# Patient Record
Sex: FEMALE | Race: WHITE | NOT HISPANIC OR LATINO | Employment: OTHER | URBAN - METROPOLITAN AREA
[De-identification: names, ages, dates, MRNs, and addresses within clinical notes are randomized per-mention and may not be internally consistent; named-entity substitution may affect disease eponyms.]

---

## 2017-12-28 ENCOUNTER — GYNECOLOGY VISIT (OUTPATIENT)
Dept: OBGYN | Facility: MEDICAL CENTER | Age: 72
End: 2017-12-28
Payer: MEDICARE

## 2017-12-28 VITALS — SYSTOLIC BLOOD PRESSURE: 138 MMHG | WEIGHT: 169 LBS | DIASTOLIC BLOOD PRESSURE: 82 MMHG

## 2017-12-28 DIAGNOSIS — N81.4 CYSTOCELE WITH UTERINE PROLAPSE: ICD-10-CM

## 2017-12-28 PROBLEM — N81.10 CYSTOCELE WITHOUT UTERINE PROLAPSE: Status: ACTIVE | Noted: 2017-12-28

## 2017-12-28 PROCEDURE — 99203 OFFICE O/P NEW LOW 30 MIN: CPT | Performed by: OBSTETRICS & GYNECOLOGY

## 2017-12-28 RX ORDER — OXYCODONE HYDROCHLORIDE AND ACETAMINOPHEN 5; 325 MG/1; MG/1
1-2 TABLET ORAL EVERY 4 HOURS PRN
COMMUNITY
End: 2021-12-07

## 2017-12-28 RX ORDER — PREGABALIN 25 MG/1
25 CAPSULE ORAL 3 TIMES DAILY
COMMUNITY
End: 2019-06-14

## 2017-12-28 RX ORDER — LISINOPRIL 5 MG/1
5 TABLET ORAL DAILY
COMMUNITY
End: 2019-10-01

## 2017-12-28 NOTE — PROGRESS NOTES
CC - VAGINAL BULGE X 3 MONTHS    History of present illness:   72 y.o. Postmenopause (50s) presents today to be evaluated for a vaginal bulge  She noticed it for last 3 months, pt reports that she have to press her lower abdomen to void, (+) incomplete emptying  Occasional PIEDAD/UI --> doesn't bother her   (+) constipation    (+) herpetic neuralgia - left side of breast thus she has stopped doing mammo x last 3 years    Review of systems:  Pertinent positives documented in HPI and all other systems reviewed & are negative    All PMH, PSH, allergies, social history and FH reviewed and updated today:  History reviewed. No pertinent past medical history.    Past Surgical History:   Procedure Laterality Date   • APPENDECTOMY     • CHOLECYSTECTOMY     • TUBAL COAGULATION LAPAROSCOPIC BILATERAL         Allergies: No Known Allergies    Social History     Social History   • Marital status:      Spouse name: N/A   • Number of children: N/A   • Years of education: N/A     Occupational History   • Not on file.     Social History Main Topics   • Smoking status: Never Smoker   • Smokeless tobacco: Never Used   • Alcohol use 1.2 oz/week     2 Glasses of wine per week   • Drug use: No   • Sexual activity: Yes     Partners: Male     Birth control/ protection: Post-Menopausal     Other Topics Concern   • Not on file     Social History Narrative   • No narrative on file       Family History   Problem Relation Age of Onset   • Hypertension Mother    • Heart Disease Father    • Hypertension Father    • Cancer Father        Physical exam:  Blood pressure 138/82, weight 76.7 kg (169 lb), not currently breastfeeding.    General:appears stated age, is in no apparent distress, is well developed and well nourished  Abdomen: Bowel sounds positive, nondistended, soft, nontender x4, no rebound or guarding. No organomegaly. No masses.  Female GYN: (+) cystocele grage 2-3 sp with valsalva, no leakage of urine  Cervix descends just beyond IS,  mobile, NT  No rectocele  Skin: No rashes, or ulcers or lesions seen  Psychiatric: Patient shows appropriate affect, is alert and oriented x3, intact judgment and insight.  1. Cystocele with uterine prolapse  REFERRAL TO GYNECOLOGY   2. Expectant vs pessary discussed with her. POP explained and brochure provided. ACOG video on POP showed to her as well to better understand  3. All questions addressed  4. Spent  30 minutes in face-to-face patient contact in which greater than 50% of that visit was spent in counseling/coordination of care of

## 2018-01-03 ENCOUNTER — GYNECOLOGY VISIT (OUTPATIENT)
Dept: OBGYN | Facility: MEDICAL CENTER | Age: 73
End: 2018-01-03
Payer: MEDICARE

## 2018-01-03 VITALS — SYSTOLIC BLOOD PRESSURE: 132 MMHG | DIASTOLIC BLOOD PRESSURE: 82 MMHG | WEIGHT: 170 LBS

## 2018-01-03 DIAGNOSIS — N81.4 CYSTOCELE WITH UTERINE PROLAPSE: ICD-10-CM

## 2018-01-03 PROCEDURE — 99213 OFFICE O/P EST LOW 20 MIN: CPT | Performed by: OBSTETRICS & GYNECOLOGY

## 2018-01-03 NOTE — PROGRESS NOTES
CC- PESSARY FITTING    History of present illness:   72 y.o. With symptomatic cysto-rectocele presents for pessary fitting  Doing well    Review of systems:  Pertinent positives documented in HPI and all other systems reviewed & are negative    All PMH, PSH, allergies, social history and FH reviewed and updated today:  No past medical history on file.    Past Surgical History:   Procedure Laterality Date   • APPENDECTOMY     • CHOLECYSTECTOMY     • TUBAL COAGULATION LAPAROSCOPIC BILATERAL         Allergies: No Known Allergies    Social History     Social History   • Marital status:      Spouse name: N/A   • Number of children: N/A   • Years of education: N/A     Occupational History   • Not on file.     Social History Main Topics   • Smoking status: Never Smoker   • Smokeless tobacco: Never Used   • Alcohol use 1.2 oz/week     2 Glasses of wine per week   • Drug use: No   • Sexual activity: Yes     Partners: Male     Birth control/ protection: Post-Menopausal     Other Topics Concern   • Not on file     Social History Narrative   • No narrative on file       Family History   Problem Relation Age of Onset   • Hypertension Mother    • Heart Disease Father    • Hypertension Father    • Cancer Father        Physical exam:  Blood pressure 132/82, weight 77.1 kg (170 lb).    General:appears stated age, is in no apparent distress, is well developed and well nourished  Female GYN: (+) cystocele 2-3 with valsalva  (+) grade 2 uterine prolapse  Skin: No rashes, or ulcers or lesions seen  Psychiatric: Patient shows appropriate affect, is alert and oriented x3, intact judgment and insight.  1. Cystocele with uterine prolapse     2. Size 3 ring fitted  3. Pt tolerated size - no pain, did not expel with valsalva  4. Will order size 3 ring with support  5. All questions addressed

## 2018-02-28 ENCOUNTER — GYNECOLOGY VISIT (OUTPATIENT)
Dept: OBGYN | Facility: MEDICAL CENTER | Age: 73
End: 2018-02-28
Payer: MEDICARE

## 2018-02-28 VITALS
WEIGHT: 168 LBS | BODY MASS INDEX: 25.46 KG/M2 | DIASTOLIC BLOOD PRESSURE: 80 MMHG | SYSTOLIC BLOOD PRESSURE: 130 MMHG | HEIGHT: 68 IN

## 2018-02-28 DIAGNOSIS — Z46.89 ENCOUNTER FOR FITTING AND ADJUSTMENT OF PESSARY: ICD-10-CM

## 2018-02-28 DIAGNOSIS — N81.4 CYSTOCELE WITH UTERINE PROLAPSE: ICD-10-CM

## 2018-02-28 DIAGNOSIS — Z12.39 BREAST SCREENING: ICD-10-CM

## 2018-02-28 PROCEDURE — 57160 INSERT PESSARY/OTHER DEVICE: CPT | Performed by: OBSTETRICS & GYNECOLOGY

## 2018-02-28 NOTE — PROGRESS NOTES
"Chief Complaint   Patient presents with   • Other     Pessary insertion        History of present illness:   72 y.o. With symptomatic cystocele with uterine prolapse presents for pessary insertion  She has bronchitis x 1 week now. Taking Abx  No further questions about the pessary     Review of systems:  Pertinent positives documented in HPI and all other systems reviewed & are negative    All PMH, PSH, allergies, social history and FH reviewed and updated today:  History reviewed. No pertinent past medical history.    Past Surgical History:   Procedure Laterality Date   • APPENDECTOMY     • CHOLECYSTECTOMY     • TUBAL COAGULATION LAPAROSCOPIC BILATERAL         Allergies: No Known Allergies    Social History     Social History   • Marital status:      Spouse name: N/A   • Number of children: N/A   • Years of education: N/A     Occupational History   • Not on file.     Social History Main Topics   • Smoking status: Never Smoker   • Smokeless tobacco: Never Used   • Alcohol use 1.2 oz/week     2 Glasses of wine per week   • Drug use: No   • Sexual activity: Yes     Partners: Male     Birth control/ protection: Post-Menopausal     Other Topics Concern   • Not on file     Social History Narrative   • No narrative on file       Family History   Problem Relation Age of Onset   • Hypertension Mother    • Heart Disease Father    • Hypertension Father    • Cancer Father      Physical exam:  Blood pressure 130/80, height 1.727 m (5' 8\"), weight 76.2 kg (168 lb).    General:appears stated age, is in no apparent distress, is well developed and well nourished  Head: normocephalic, non-tender  Neck: neck is supple  CV : regular rate and rhythm, no peripheral edema  Lungs: Normal respiratory effort. Clear to auscultation bilaterally  Abdomen: Bowel sounds positive, nondistended, soft, nontender x4, no rebound or guarding. No organomegaly. No masses.  Female GYN: (+) cystocele  Skin: No rashes, or ulcers or lesions " seen  Psychiatric: Patient shows appropriate affect, is alert and oriented x3, intact judgment and insight.    1. POP - cystocele with uterine prolapse, symptomatic  2. Breast cancer screening - Mammo  3. Size 3 ring with support inserted without difficulty. Pt has no complaints.   4. Pessary maintenance   5. Gyn ff-up 3 months/PRN

## 2018-05-03 ENCOUNTER — GYNECOLOGY VISIT (OUTPATIENT)
Dept: OBGYN | Facility: MEDICAL CENTER | Age: 73
End: 2018-05-03
Payer: MEDICARE

## 2018-05-03 VITALS
HEIGHT: 68 IN | WEIGHT: 168 LBS | SYSTOLIC BLOOD PRESSURE: 120 MMHG | BODY MASS INDEX: 25.46 KG/M2 | DIASTOLIC BLOOD PRESSURE: 76 MMHG

## 2018-05-03 DIAGNOSIS — Z46.89 PESSARY MAINTENANCE: ICD-10-CM

## 2018-05-03 PROCEDURE — 99213 OFFICE O/P EST LOW 20 MIN: CPT | Performed by: OBSTETRICS & GYNECOLOGY

## 2018-05-03 NOTE — PROGRESS NOTES
"Chief Complaint   Patient presents with   • Other     Pessary Maintenance        History of present illness:   72 y.o. presents today for pessary maintenance  The pessary relieves her symptoms of POP  No vaginal bleeding  (+) episode of lower pelvic pains - concerned about her ovaries    Review of systems:  Pertinent positives documented in HPI and all other systems reviewed & are negative    All PMH, PSH, allergies, social history and FH reviewed and updated today:  History reviewed. No pertinent past medical history.    Past Surgical History:   Procedure Laterality Date   • APPENDECTOMY     • CHOLECYSTECTOMY     • TUBAL COAGULATION LAPAROSCOPIC BILATERAL         Allergies: No Known Allergies    Social History     Social History   • Marital status:      Spouse name: N/A   • Number of children: N/A   • Years of education: N/A     Occupational History   • Not on file.     Social History Main Topics   • Smoking status: Never Smoker   • Smokeless tobacco: Never Used   • Alcohol use 1.2 oz/week     2 Glasses of wine per week   • Drug use: No   • Sexual activity: Yes     Partners: Male     Birth control/ protection: Post-Menopausal     Other Topics Concern   • Not on file     Social History Narrative   • No narrative on file       Family History   Problem Relation Age of Onset   • Hypertension Mother    • Heart Disease Father    • Hypertension Father    • Cancer Father        Physical exam:  Blood pressure 120/76, height 1.727 m (5' 8\"), weight 76.2 kg (168 lb).    General:appears stated age, is in no apparent distress, is well developed and well nourished  Head: normocephalic, non-tender  Neck: neck is supple  CV : regular rate and rhythm, no peripheral edema  Lungs: Normal respiratory effort. Clear to auscultation bilaterally  Abdomen: Bowel sounds positive, nondistended, soft, nontender x4, no rebound or guarding. No organomegaly. No masses.  Female GYN: SSE - no vaginal ulceration  (+) cysto-rectocele  BME - " unremarkable, no tenderness  Skin: No rashes, or ulcers or lesions seen  Psychiatric: Patient shows appropriate affect, is alert and oriented x3, intact judgment and insight.  1. Pessary maintenance     2.   TVS done - atrophic uterus and ovaries, no adnexal masses  3.    Pessary taken out, cleaned with soap and water. Replaced intravaginally gently. Pt tolerated procedure well  4.    Pessary maintenance 3-4 months/PRN

## 2018-09-12 ENCOUNTER — GYNECOLOGY VISIT (OUTPATIENT)
Dept: OBGYN | Facility: MEDICAL CENTER | Age: 73
End: 2018-09-12
Payer: MEDICARE

## 2018-09-12 VITALS
WEIGHT: 171 LBS | DIASTOLIC BLOOD PRESSURE: 70 MMHG | BODY MASS INDEX: 25.91 KG/M2 | SYSTOLIC BLOOD PRESSURE: 120 MMHG | HEIGHT: 68 IN

## 2018-09-12 DIAGNOSIS — Z46.89 ENCOUNTER FOR PESSARY MAINTENANCE: ICD-10-CM

## 2018-09-12 DIAGNOSIS — N81.9 FEMALE GENITAL PROLAPSE, UNSPECIFIED TYPE: ICD-10-CM

## 2018-09-12 PROCEDURE — 99212 OFFICE O/P EST SF 10 MIN: CPT | Performed by: OBSTETRICS & GYNECOLOGY

## 2018-09-12 NOTE — PROGRESS NOTES
"GYN Visit    CC: pessary maintenance    HPI: 72 y.o.  here for pessary maintenance.    Pt denies vaginal bleeding and pain.  No problems with current pessary.  Occasional mild discharge, no irritation, not malodorous  Pt is not on vaginal estrogen.    ROS:  Gen: denies general concerns  : denies concerns, see HPI      /70   Ht 1.727 m (5' 8\")   Wt 77.6 kg (171 lb)   Breastfeeding? No   BMI 26.00 kg/m²   Gen: AAO, NAD  : NEFG, normal vagina and cervix, no erosions     Pessary ring w/ support removed, cleaned and replaced easily    A/P: 72 y.o. F with pelvic organ prolapse here for pessary maintenance  - pt doing well with pessary, no signs of complications    Cont to RTC q3-4mos for pessary cleaning    Erin Gann MD  [facility], Women[facility]      "

## 2019-01-24 ENCOUNTER — GYNECOLOGY VISIT (OUTPATIENT)
Dept: OBGYN | Facility: MEDICAL CENTER | Age: 74
End: 2019-01-24
Payer: MEDICARE

## 2019-01-24 VITALS
WEIGHT: 170.64 LBS | HEIGHT: 68 IN | SYSTOLIC BLOOD PRESSURE: 140 MMHG | DIASTOLIC BLOOD PRESSURE: 90 MMHG | BODY MASS INDEX: 25.86 KG/M2

## 2019-01-24 DIAGNOSIS — N81.9 FEMALE GENITAL PROLAPSE, UNSPECIFIED TYPE: ICD-10-CM

## 2019-01-24 DIAGNOSIS — N95.2 VAGINAL ATROPHY: ICD-10-CM

## 2019-01-24 DIAGNOSIS — Z46.89 PESSARY MAINTENANCE: ICD-10-CM

## 2019-01-24 PROCEDURE — 99213 OFFICE O/P EST LOW 20 MIN: CPT | Performed by: OBSTETRICS & GYNECOLOGY

## 2019-01-24 RX ORDER — ESTRADIOL 10 UG/1
10 INSERT VAGINAL DAILY
Qty: 20 TAB | Refills: 0 | Status: SHIPPED | OUTPATIENT
Start: 2019-01-24 | End: 2019-06-14

## 2019-01-24 NOTE — PROGRESS NOTES
"GYN Visit    CC: pessary maintenance    HPI: 73 y.o.  here for pessary maintenance.    Pt denies vaginal bleeding, discharge and pain.  No problems with current pessary.    Leaves tomorrow to go down to [place] to visit her daughter.    ROS:  Gen: denies general concerns  : denies concerns, see HPI    GYN history: Reports always normal Pap smears every year for her adult life    /90 (BP Location: Right arm, Patient Position: Sitting)   Ht 1.727 m (5' 8\")   Wt 77.4 kg (170 lb 10.2 oz)   LMP  (LMP Unknown)   Breastfeeding? No   BMI 25.95 kg/m²   Gen: AAO, NAD  : NEFG, normal vagina and cervix with small area on posterior left cervix which was abraded during pessary removal and noted to be bleeding.  Silver nitrate applied with resulting hemostasis noted, no erosions of vaginal walls.    Pessary  removed, cleaned and replaced easily    A/P: 73 y.o. F with pelvic organ prolapse here for pessary maintenance  - pt doing well with pessary, although with significant atrophy noted and bleeding on cervix - no lesion seen today, anticipate from abrasion with pessary removal.  Will treat with vaginal estrogen and have pt return in 1 mos for repeat exam    RTC 1 month    Erin Gann MD  [facility], Women[facility]      "

## 2019-03-05 ENCOUNTER — GYNECOLOGY VISIT (OUTPATIENT)
Dept: OBGYN | Facility: MEDICAL CENTER | Age: 74
End: 2019-03-05
Payer: MEDICARE

## 2019-03-05 VITALS
WEIGHT: 173 LBS | DIASTOLIC BLOOD PRESSURE: 78 MMHG | BODY MASS INDEX: 26.22 KG/M2 | SYSTOLIC BLOOD PRESSURE: 115 MMHG | HEIGHT: 68 IN

## 2019-03-05 DIAGNOSIS — R10.9 ABDOMINAL DISCOMFORT: ICD-10-CM

## 2019-03-05 DIAGNOSIS — Z46.89 ENCOUNTER FOR PESSARY MAINTENANCE: ICD-10-CM

## 2019-03-05 PROCEDURE — 99213 OFFICE O/P EST LOW 20 MIN: CPT | Performed by: OBSTETRICS & GYNECOLOGY

## 2019-03-05 NOTE — PROGRESS NOTES
"GYN Visit    CC: pessary maintenance and abdominal discomfort    HPI: 73 y.o.  here for pessary maintenance.    Pt denies vaginal bleeding, discharge.  No problems with current pessary.  Does also reports some lower abdominal discomfort, mild cramping into her back.  Not very painful, reminds her of pain from her ovaries.  Cant' tell exactly how long it has been there, maybe a few weeks to months.  Occasional constipation but not worse recently, denies gasiness/bloating. No other concerns.  Denies dysuria.      ROS:  Gen: denies general concerns, fevers  GI: abd discomfort as above, denies constipation/diarrhea, other GI concerns  : denies concerns, see HPI    /78 (BP Location: Right arm)   Ht 1.727 m (5' 8\")   Wt 78.5 kg (173 lb)   LMP  (LMP Unknown)   BMI 26.30 kg/m²   Gen: AAO, NAD  : NEFG, normal vagina and cervix, no erosions; + atrophy with spotting from speculum exam of vaginal wall  Uterus: 8-10wk size, nontender, mobile, no adnexal masses or tenderness    Pessary removed, cleaned and replaced easily    A/P: 73 y.o. F with pelvic organ prolapse here for pessary maintenance also with abdominal discomfort  - pt doing well with pessary, no signs of complications  - abdominal discomfort: nonspecific by history and not present very long.  Pelvic exam normal today.  Rx for pelvic US given, if sxs self resolve can defer US, but if with persistent discomfort, recommend US to evaluate uterus/adnexa    - RTC q3mos for pessary cleaning, earlier if worsening sxs    Erin Gann MD  [facility], [facility]      "

## 2019-03-05 NOTE — PROGRESS NOTES
Pt presents for GYN visit,pessary check.Pt ha ha no bleeding and states slight cramping?. Pt did not have rx filled for vaginal estrogen due to issues with rx?   Ph#[phone]  Pharm#[id number]

## 2019-04-05 ENCOUNTER — OFFICE VISIT (OUTPATIENT)
Dept: URGENT CARE | Facility: CLINIC | Age: 74
End: 2019-04-05
Payer: MEDICARE

## 2019-04-05 ENCOUNTER — HOSPITAL ENCOUNTER (OUTPATIENT)
Facility: MEDICAL CENTER | Age: 74
End: 2019-04-05
Attending: PHYSICIAN ASSISTANT
Payer: MEDICARE

## 2019-04-05 VITALS
TEMPERATURE: 98.2 F | BODY MASS INDEX: 25.46 KG/M2 | WEIGHT: 168 LBS | DIASTOLIC BLOOD PRESSURE: 82 MMHG | OXYGEN SATURATION: 96 % | SYSTOLIC BLOOD PRESSURE: 128 MMHG | HEART RATE: 76 BPM | RESPIRATION RATE: 16 BRPM | HEIGHT: 68 IN

## 2019-04-05 DIAGNOSIS — N30.01 ACUTE CYSTITIS WITH HEMATURIA: ICD-10-CM

## 2019-04-05 LAB
APPEARANCE UR: NORMAL
BILIRUB UR STRIP-MCNC: NEGATIVE MG/DL
COLOR UR AUTO: NORMAL
GLUCOSE UR STRIP.AUTO-MCNC: NEGATIVE MG/DL
KETONES UR STRIP.AUTO-MCNC: NORMAL MG/DL
LEUKOCYTE ESTERASE UR QL STRIP.AUTO: NORMAL
NITRITE UR QL STRIP.AUTO: NEGATIVE
PH UR STRIP.AUTO: 6.5 [PH] (ref 5–8)
PROT UR QL STRIP: 300 MG/DL
RBC UR QL AUTO: NORMAL
SP GR UR STRIP.AUTO: 1.02
UROBILINOGEN UR STRIP-MCNC: 0.2 MG/DL

## 2019-04-05 PROCEDURE — 81002 URINALYSIS NONAUTO W/O SCOPE: CPT | Performed by: PHYSICIAN ASSISTANT

## 2019-04-05 PROCEDURE — 99214 OFFICE O/P EST MOD 30 MIN: CPT | Performed by: PHYSICIAN ASSISTANT

## 2019-04-05 PROCEDURE — 87186 SC STD MICRODIL/AGAR DIL: CPT

## 2019-04-05 PROCEDURE — 87077 CULTURE AEROBIC IDENTIFY: CPT

## 2019-04-05 PROCEDURE — 87086 URINE CULTURE/COLONY COUNT: CPT

## 2019-04-05 RX ORDER — CEFDINIR 300 MG/1
300 CAPSULE ORAL EVERY 12 HOURS
Qty: 10 CAP | Refills: 0 | Status: SHIPPED | OUTPATIENT
Start: 2019-04-05 | End: 2019-04-12

## 2019-04-05 ASSESSMENT — ENCOUNTER SYMPTOMS
FLANK PAIN: 0
MUSCULOSKELETAL NEGATIVE: 1
DIARRHEA: 0
ABDOMINAL PAIN: 0
DIZZINESS: 0
SHORTNESS OF BREATH: 0
VOMITING: 0
CHILLS: 0
FEVER: 0
NAUSEA: 0

## 2019-04-05 NOTE — PROGRESS NOTES
"Subjective:      Rosenda Cowan is a 73 y.o. female who presents with UTI (frequency, burning sensation, x2days)            Dysuria    This is a new problem. The current episode started in the past 7 days (2 days). The problem occurs every urination. The quality of the pain is described as burning. The pain is at a severity of 2/10. The pain is mild. There is no history of pyelonephritis. Associated symptoms include frequency and urgency. Pertinent negatives include no chills, flank pain, hematuria, nausea or vomiting. She has tried increased fluids for the symptoms. The treatment provided no relief. There is no history of catheterization, kidney stones, recurrent UTIs or a urological procedure.       Review of Systems   Constitutional: Negative for chills and fever.   HENT: Negative for congestion.    Respiratory: Negative for shortness of breath.    Cardiovascular: Negative for chest pain.   Gastrointestinal: Negative for abdominal pain, diarrhea, nausea and vomiting.   Genitourinary: Positive for dysuria, frequency and urgency. Negative for flank pain and hematuria.   Musculoskeletal: Negative.    Skin: Negative for rash.   Neurological: Negative for dizziness.          Objective:     /82 (BP Location: Left arm, Patient Position: Sitting, BP Cuff Size: Adult)   Pulse 76   Temp 36.8 °C (98.2 °F) (Temporal)   Resp 16   Ht 1.727 m (5' 8\")   Wt 76.2 kg (168 lb)   LMP  (LMP Unknown)   SpO2 96%   BMI 25.54 kg/m²      Physical Exam   Constitutional: She is oriented to person, place, and time. She appears well-developed and well-nourished. No distress.   HENT:   Head: Normocephalic and atraumatic.   Right Ear: External ear normal.   Left Ear: External ear normal.   Mouth/Throat: Oropharynx is clear and moist.   Eyes: Pupils are equal, round, and reactive to light. Conjunctivae are normal.   Neck: Normal range of motion.   Cardiovascular: Normal rate.    Pulmonary/Chest: Effort normal.   Abdominal: Soft. " Bowel sounds are normal. She exhibits no distension. There is no tenderness. There is no guarding.   No CVAT bilaterally    Musculoskeletal: Normal range of motion.   Neurological: She is alert and oriented to person, place, and time.   Skin: Skin is warm and dry. She is not diaphoretic.   Psychiatric: She has a normal mood and affect. Her behavior is normal.   Nursing note and vitals reviewed.         PMH:  has no past medical history on file.  MEDS:   Current Outpatient Prescriptions:   •  cefdinir (OMNICEF) 300 MG Cap, Take 1 Cap by mouth every 12 hours for 7 days., Disp: 10 Cap, Rfl: 0  •  pregabalin (LYRICA) 25 MG Cap, Take 25 mg by mouth 3 times a day., Disp: , Rfl:   •  estradiol (VAGIFEM) 10 MCG Tab, Insert 1 Tab in vagina every day. Insert 1 tab daily into the vagina for 2 weeks then insert 1 tab into the vagina 2x weekly (Patient not taking: Reported on 4/5/2019), Disp: 20 Tab, Rfl: 0  •  oxycodone-acetaminophen (PERCOCET) 5-325 MG Tab, Take 1-2 Tabs by mouth every four hours as needed., Disp: , Rfl:   •  lisinopril (PRINIVIL) 5 MG Tab, Take 5 mg by mouth every day., Disp: , Rfl:   ALLERGIES: No Known Allergies  SURGHX:   Past Surgical History:   Procedure Laterality Date   • APPENDECTOMY     • CHOLECYSTECTOMY     • TUBAL COAGULATION LAPAROSCOPIC BILATERAL       SOCHX:  reports that she has never smoked. She has never used smokeless tobacco. She reports that she drinks about 1.2 oz of alcohol per week . She reports that she does not use drugs.  FH: family history includes Cancer in her father; Heart Disease in her father; Hypertension in her father and mother.    POCT Urinalysis:   Component Results     Component Value Ref Range & Units Status   POC Color dark yellow  Negative Final   POC Appearance cloudy  Negative Final   POC Leukocyte Esterase large  Negative Final   POC Nitrites negative  Negative Final   POC Urobiligen 0.2  Negative (0.2) mg/dL Final   POC Protein 300  Negative mg/dL Final   POC Urine  PH 6.5  5.0 - 8.0 Final   POC Blood large  Negative Final   POC Specific Gravity 1.025  <1.005 - >1.030 Final   POC Ketones trace  Negative mg/dL Final   POC Bilirubin negative  Negative mg/dL Final   POC Glucose negative  Negative mg/dL Final   Last Resulted Time   Fri Apr 5, 2019 11:34 AM       Assessment/Plan:     1. Acute cystitis with hematuria  - POCT Urinalysis --> + leuks, + blood  - URINE CULTURE(NEW); Future  - cefdinir (OMNICEF) 300 MG Cap; Take 1 Cap by mouth every 12 hours for 7 days.  Dispense: 10 Cap; Refill: 0   - Complete full course of antibiotics as prescribed     - Pt educated on preventative measures for avoiding future UTIs  - Advised to increase fluid intake  - OTC Pyridium (Azo) for symptomatic relief, advised that it will turn urine orange in color  - Pending urine culture  - ER precautions given regarding pyelonephritis including fevers greater than 101 and, vomiting and dehydration, increased back pain.

## 2019-04-07 ENCOUNTER — TELEPHONE (OUTPATIENT)
Dept: URGENT CARE | Facility: CLINIC | Age: 74
End: 2019-04-07

## 2019-04-07 LAB
BACTERIA UR CULT: ABNORMAL
BACTERIA UR CULT: ABNORMAL
SIGNIFICANT IND 70042: ABNORMAL
SITE SITE: ABNORMAL
SOURCE SOURCE: ABNORMAL

## 2019-04-07 NOTE — TELEPHONE ENCOUNTER
Left message for patient informing her of positive urine culture result for E. Coli., susceptible to current course of antibiotics. Encouraged to return my call with any questions or concerns.

## 2019-06-14 ENCOUNTER — OFFICE VISIT (OUTPATIENT)
Dept: MEDICAL GROUP | Facility: PHYSICIAN GROUP | Age: 74
End: 2019-06-14
Payer: MEDICARE

## 2019-06-14 VITALS
HEART RATE: 74 BPM | SYSTOLIC BLOOD PRESSURE: 130 MMHG | WEIGHT: 173 LBS | TEMPERATURE: 98.3 F | RESPIRATION RATE: 14 BRPM | HEIGHT: 68 IN | OXYGEN SATURATION: 92 % | DIASTOLIC BLOOD PRESSURE: 70 MMHG | BODY MASS INDEX: 26.22 KG/M2

## 2019-06-14 DIAGNOSIS — B02.29 POST HERPETIC NEURALGIA: ICD-10-CM

## 2019-06-14 DIAGNOSIS — N81.4 CYSTOCELE WITH UTERINE PROLAPSE: ICD-10-CM

## 2019-06-14 DIAGNOSIS — N81.6 RECTOCELE: ICD-10-CM

## 2019-06-14 DIAGNOSIS — K59.00 CONSTIPATION, UNSPECIFIED CONSTIPATION TYPE: ICD-10-CM

## 2019-06-14 DIAGNOSIS — E78.2 MIXED HYPERLIPIDEMIA: ICD-10-CM

## 2019-06-14 DIAGNOSIS — I10 ESSENTIAL HYPERTENSION: ICD-10-CM

## 2019-06-14 PROCEDURE — 99214 OFFICE O/P EST MOD 30 MIN: CPT | Performed by: PHYSICIAN ASSISTANT

## 2019-06-14 RX ORDER — ATORVASTATIN CALCIUM 20 MG/1
20 TABLET, FILM COATED ORAL EVERY EVENING
Qty: 90 TAB | Refills: 2 | Status: SHIPPED | OUTPATIENT
Start: 2019-06-14 | End: 2019-10-01

## 2019-06-14 ASSESSMENT — PATIENT HEALTH QUESTIONNAIRE - PHQ9: CLINICAL INTERPRETATION OF PHQ2 SCORE: 0

## 2019-06-14 NOTE — LETTER
[facility]  Sosa Smalls P.A.-C.  [address]  [place] [address]  Fax: [phone]   Authorization for Release/Disclosure of   Protected Health Information   Name: KEKE VILLAR : 1945 SSN: [id number]   Address: [address] [place] [address] [place] [address] Phone:    [phone] (home)    I authorize the entity listed below to release/disclose the PHI below to:   [facility]/Sosa Smalls P.A.-C. and Sosa Smalls P.A.-C[facility]   Provider or Entity Name:     Address   City, State, Zip   Phone:      Fax:     Reason for request: continuity of care   Information to be released:    [  ] LAST COLONOSCOPY,  including any PATH REPORT and follow-up  [  ] LAST FIT/COLOGUARD RESULT [  ] LAST DEXA  [  ] LAST MAMMOGRAM  [  ] LAST PAP  [  ] LAST LABS [  ] RETINA EXAM REPORT  [  ] IMMUNIZATION RECORDS  [  ] Release all info      [  ] Check here and initial the line next to each item to release ALL health information INCLUDING  _____ Care and treatment for drug and / or alcohol abuse  _____ HIV testing, infection status, or AIDS  _____ Genetic Testing    DATES OF SERVICE OR TIME PERIOD TO BE DISCLOSED: _____________  I understand and acknowledge that:  * This Authorization may be revoked at any time by you in writing, except if your health information has already been used or disclosed.  * Your health information that will be used or disclosed as a result of you signing this authorization could be re-disclosed by the recipient. If this occurs, your re-disclosed health information may no longer be protected by State or Federal laws.  * You may refuse to sign this Authorization. Your refusal will not affect your ability to obtain treatment.  * This Authorization becomes effective upon signing and will  on (date) __________.      If no date is indicated, this Authorization will  one (1) year from the signature date.    Name: Keke Villar    Signature:   Date:     2019       PLEASE FAX REQUESTED RECORDS  BACK TO: [phone]

## 2019-06-14 NOTE — LETTER
[facility]  Sosa Smalls P.A.-C.  [address]  [place] [address]  Fax: [phone]   Authorization for Release/Disclosure of   Protected Health Information   Name: KEKE VILLAR : 1945 SSN: [id number]   Address: [address] [place] [address] [place] [address] Phone:    [phone] (home)    I authorize the entity listed below to release/disclose the PHI below to:   [facility]/Sosa Smalls P.A.-C. and Sosa Smalls P.A.-C[facility]   Provider or Entity Name:     Address   City, State, Zip   Phone:      Fax:     Reason for request: continuity of care   Information to be released:    [  ] LAST COLONOSCOPY,  including any PATH REPORT and follow-up  [  ] LAST FIT/COLOGUARD RESULT [  ] LAST DEXA  [  ] LAST MAMMOGRAM  [  ] LAST PAP  [  ] LAST LABS [  ] RETINA EXAM REPORT  [  ] IMMUNIZATION RECORDS  [  ] Release all info      [  ] Check here and initial the line next to each item to release ALL health information INCLUDING  _____ Care and treatment for drug and / or alcohol abuse  _____ HIV testing, infection status, or AIDS  _____ Genetic Testing    DATES OF SERVICE OR TIME PERIOD TO BE DISCLOSED: _____________  I understand and acknowledge that:  * This Authorization may be revoked at any time by you in writing, except if your health information has already been used or disclosed.  * Your health information that will be used or disclosed as a result of you signing this authorization could be re-disclosed by the recipient. If this occurs, your re-disclosed health information may no longer be protected by State or Federal laws.  * You may refuse to sign this Authorization. Your refusal will not affect your ability to obtain treatment.  * This Authorization becomes effective upon signing and will  on (date) __________.      If no date is indicated, this Authorization will  one (1) year from the signature date.    Name: Keke Villar    Signature:   Date:     2019       PLEASE FAX REQUESTED RECORDS  BACK TO: [phone]

## 2019-06-14 NOTE — PROGRESS NOTES
Chief Complaint   Patient presents with   • Establish Care     est care with nahomy        HISTORY OF PRESENT ILLNESS: Rosenda Cowan is an established 73 y.o. female here to discuss the evaluation and management of:      Post herpetic neuralgia  This is a chronic but stable problem.  She tells me that she is prescribed Lyrica 150 mg capsule 3 times daily for postherpetic neuralgia.  States she developed shingles in 11/2013 and since then she is been placed on Lyrica.  She tells me that she is compliant with medication expenses no side effects or complications of medication.  States she follows up with pain management at [facility] and they manage Lyrica for her.  States she is also prescribed Percocet when pain symptoms exacerbate.  States on average she will take pain medication 2-3 times per month.  Denies misuse or abuse of medication.  Denies drinking alcohol or combining other sedating medications when taking medication.  Denies operating heavy machinery when taking Percocet.  Further work-up indicated at this time.    Essential hypertension  States she was prescribed lisinopril 5 mg tab once daily but she discontinued medication 1 month ago on her own.  Patient's blood pressure during today's appointment 130/70 mmHg.  States she was experiencing elevated blood pressure readings due to pain symptoms so lisinopril was prescribed.  She tells me that she monitors her blood pressure at home and systolic readings are in the 120s and diastolic is usually 76/78 mmHg.  Denies chest pain, shortness of breath, heart palpitations, dizziness, syncope, severe headache, vision changes, peripheral edema.    Rectocele  Cystocele with uterine prolapse  Patient is following up with OB/GYN for further work-up.  Ultrasound has been scheduled for 6/24/2019 to further investigate pelvic pain symptoms.  She tells me that she has a pessary.  No further work-up indicated at this time.    Mixed hyperlipidemia  Patient has brought in  lipid profile lab work from 2017.  Total cholesterol and LDL were elevated.  Discussed cardiovascular risk score with patient.  Calculated ASCVD and patient is at medium risk.  11%.  Patient has agreed to starting a statin medication.  She tells me that she walks at least 5 days a week 30 minutes per time.  Denies getting her heart rate up or breaking a sweat.  States her diet could improve.    Constipation, unspecified constipation type  This is a chronic problem.  She tells me that she takes over-the-counter Metamucil and has been eating more grains.  States she will have a bowel movement every 2 days.  Admits that bowel movements can be semi-formed.  States she needs to work on hydration.  Denies melena, hematochezia, changes in caliber, nausea, vomiting, night sweats, fever, chills, unintentional weight loss.  Patient is inquiring about treatment options.          Patient Active Problem List    Diagnosis Date Noted   • Post herpetic neuralgia 2019   • Essential hypertension 2019   • Rectocele 2019   • Cystocele with uterine prolapse 2017       Allergies:Patient has no known allergies.    Current Outpatient Prescriptions   Medication Sig Dispense Refill   • atorvastatin (LIPITOR) 20 MG Tab Take 1 Tab by mouth every evening. 90 Tab 2   • LYRICA 150 MG Cap Take 1 Cap by mouth 3 times a day.     • oxycodone-acetaminophen (PERCOCET) 5-325 MG Tab Take 1-2 Tabs by mouth every four hours as needed.     • lisinopril (PRINIVIL) 5 MG Tab Take 5 mg by mouth every day.       No current facility-administered medications for this visit.        Social History   Substance Use Topics   • Smoking status: Never Smoker   • Smokeless tobacco: Never Used   • Alcohol use Yes      Comment: 3 - 4 dirnks per month.        Family Status   Relation Status   • Mo    • Fa    • Sis Alive   • Sis Alive   • Sis Alive   • Bridger Alive   • Bridger Alive     Family History   Problem Relation Age of Onset   •  "Hypertension Mother    • Heart Disease Father    • Hypertension Father    • Colon Cancer Father    • Other Sister         Intellectual Disability    • No Known Problems Daughter    • No Known Problems Daughter        ROS:  Review of Systems   Constitutional: Negative for fever, chills, weight loss and malaise/fatigue.   HENT: Negative for ear pain, nosebleeds, congestion, sore throat and neck pain.    Eyes: Negative for blurred vision.   Respiratory: Negative for cough, sputum production, shortness of breath and wheezing.    Cardiovascular: Negative for chest pain, palpitations, orthopnea and leg swelling.   Gastrointestinal: Negative for heartburn, nausea, vomiting and abdominal pain.   Genitourinary: Negative for dysuria, urgency and frequency.   Musculoskeletal: Negative for myalgias, back pain and joint pain.   Skin: Negative for rash and itching.   Neurological: Negative for dizziness, tingling, tremors, sensory change, focal weakness and headaches.   Endo/Heme/Allergies: Does not bruise/bleed easily.   Psychiatric/Behavioral: Negative for depression, suicidal ideas and memory loss.  The patient is not nervous/anxious and does not have insomnia.    All other systems reviewed and are negative except as in HPI.    Exam: /70 (BP Location: Left arm, Patient Position: Sitting, BP Cuff Size: Adult)   Pulse 74   Temp 36.8 °C (98.3 °F) (Temporal)   Resp 14   Ht 1.727 m (5' 8\")   Wt 78.5 kg (173 lb)   SpO2 92%  Body mass index is 26.3 kg/m².  General: Normal appearing. No distress.  HEENT: Normocephalic. Eyes conjunctiva clear lids without ptosis, pupils equal and reactive to light accommodation, ears normal shape and contour, canals are clear bilaterally, tympanic membranes are benign, nasal mucosa benign, oropharynx is without erythema, edema or exudates.   Neck: Supple without JVD or bruit. Thyroid is not enlarged.  Pulmonary: Clear to ausculation.  Normal effort. No rales, ronchi, or " wheezing.  Cardiovascular: Regular rate and rhythm without murmur.   Abdomen: Soft, nontender, nondistended. Normal bowel sounds. Liver and spleen are not palpable  Neurologic: Grossly nonfocal.  Cranial nerves are normal.   Lymph: No cervical, supraclavicular or axillary lymph nodes are palpable  Skin: Warm and dry.  No rashes or suspicious skin lesions.  Musculoskeletal: Normal gait. No extremity cyanosis, clubbing, or edema.  Psych: Normal mood and affect. Alert and oriented x3. Judgment and insight is normal.    Medical decision-making and discussion:  1. Post herpetic neuralgia  This is a chronic but stable problem.  Continue following up with pain management.  Continue current medication regimen.  Continue to monitor.  Reiterated the importance of not drinking alcohol, operate heavy machinery, or combine other sedating medication when taking controlled substances.  Patient agreed with plan.      2. Rectocele  3. Cystocele with uterine prolapse  Continue following up with OB/GYN.  Patient is scheduled for an ultrasound on 6/24/2019.  Continue to monitor.      4. Mixed hyperlipidemia  Discussed ASCVD risk with patient.  She has a high risk 11%.  Patient agreed to start a statin medication.  Advised patient to take over-the-counter co-Q10 2 weeks prior to starting statin medication.  Patient will complete lipid profile lab work after being on statin medication for 3 months.  Patient is scheduled to follow-up in 4 months for reevaluation.  Continue work on diet and exercise.    - atorvastatin (LIPITOR) 20 MG Tab; Take 1 Tab by mouth every evening.  Dispense: 90 Tab; Refill: 2  - Lipid Profile; Future  - Comp Metabolic Panel; Future    5. Essential hypertension  Patient discontinued lisinopril on her own.  States she felt that she no longer needed the medication and heard about the unpleasant long-term side effects.  Patient's blood pressure is 130/70 mmHg during today's appointment.  She tells me that she monitors  her blood pressure at home and systolic is usually in the 120s and diastolic ranges from 76/78 mmHg.  Advised patient to continue work on diet and exercise.  Decrease sodium consumption.  Discussed ED precautions.  Patient will follow-up in 4 months for reevaluation.    - Comp Metabolic Panel; Future    6. Constipation, unspecified constipation type  Discussed importance of healthy diet, regular exercise routine, and staying hydrated.  Advised patient to work on hydration and increase fiber consumption.  Patient will follow-up in 4 months for reevaluation.  We will consider starting patient on MiraLAX if symptoms have not improved.      Please note that this dictation was created using voice recognition software. I have made every reasonable attempt to correct obvious errors, but I expect that there are errors of grammar and possibly content that I did not discover before finalizing the note.    Assessment/Plan:  1. Post herpetic neuralgia     2. Essential hypertension     3. Rectocele     4. Cystocele with uterine prolapse     5. Mixed hyperlipidemia  atorvastatin (LIPITOR) 20 MG Tab    Lipid Profile       Return in about 4 months (around 10/14/2019).

## 2019-06-14 NOTE — LETTER
Critical [facility] hospital  [facility]  [address]  [place] [address]  Fax: [phone]   Authorization for Release/Disclosure of   Protected Health Information   Name: KEKE VILLAR : 1945 SSN: [id number]   Address: [address] [place] [address] [place] [address] Phone:    [phone] (home)    I authorize the entity listed below to release/disclose the PHI below to:   Critical access hospital/[facility] and [facility]   Provider or Entity Name:     Address   City, State, Zip   Phone:      Fax:     Reason for request: continuity of care   Information to be released:    [  ] LAST COLONOSCOPY,  including any PATH REPORT and follow-up  [  ] LAST FIT/COLOGUARD RESULT [  ] LAST DEXA  [  ] LAST MAMMOGRAM  [  ] LAST PAP  [  ] LAST LABS [  ] RETINA EXAM REPORT  [  ] IMMUNIZATION RECORDS  [  ] Release all info      [  ] Check here and initial the line next to each item to release ALL health information INCLUDING  _____ Care and treatment for drug and / or alcohol abuse  _____ HIV testing, infection status, or AIDS  _____ Genetic Testing    DATES OF SERVICE OR TIME PERIOD TO BE DISCLOSED: _____________  I understand and acknowledge that:  * This Authorization may be revoked at any time by you in writing, except if your health information has already been used or disclosed.  * Your health information that will be used or disclosed as a result of you signing this authorization could be re-disclosed by the recipient. If this occurs, your re-disclosed health information may no longer be protected by State or Federal laws.  * You may refuse to sign this Authorization. Your refusal will not affect your ability to obtain treatment.  * This Authorization becomes effective upon signing and will  on (date) __________.      If no date is indicated, this Authorization will  one (1) year from the signature date.    Name: Keke Villar    Signature:   Date:     2019       PLEASE FAX REQUESTED RECORDS  BACK TO: [phone]

## 2019-06-21 ENCOUNTER — GYNECOLOGY VISIT (OUTPATIENT)
Dept: OBGYN | Facility: MEDICAL CENTER | Age: 74
End: 2019-06-21
Payer: MEDICARE

## 2019-06-21 VITALS — BODY MASS INDEX: 26.3 KG/M2 | DIASTOLIC BLOOD PRESSURE: 70 MMHG | SYSTOLIC BLOOD PRESSURE: 142 MMHG | WEIGHT: 173 LBS

## 2019-06-21 DIAGNOSIS — N81.10 PELVIC ORGAN PROLAPSE QUANTIFICATION STAGE 3 CYSTOCELE: ICD-10-CM

## 2019-06-21 PROCEDURE — 99213 OFFICE O/P EST LOW 20 MIN: CPT | Performed by: OBSTETRICS & GYNECOLOGY

## 2019-06-21 NOTE — PROGRESS NOTES
S: Rosenda presents today for pessary removal.  She is having a pelvic ultrasound which is scheduled on June 24.  She complains of right-sided abdominal pain, described as an ache when she is on her feet for a prolonged period.  The pain has been worsening over the last 3 weeks.  She denies dysuria.  She has occasional constipation.  She has been using a pessary for pelvic organ prolapse for approximately 2 years.  Lately she feels like the pessary was rubbing in her vagina and she noticed some vaginal spotting.  She is unable to remove or replace the pessary herself.    O:/70   Wt 78.5 kg (173 lb)      GENERAL: Alert, in no apparent distress  PSYCHIATRIC: Appropriate affect, intact insight and judgement.  ABDOMEN: Soft, nontender, nondistended.  No palpable masses.  No rebound or guarding.  No inguinal lymphadenopathy.  No hepatosplenomegaly.  No hernias.  EXTREMITIES: No edema  SKIN: No rash    GENITOURINARY:  Normal external genitalia, no lesions.  Normal urethral meatus, no masses or tenderness.  2nd degree cystocele is present.   Vagina atrophic, no vaginal discharge.  A vascular area is present to the right of the urethra, no active bleeding.  No vaginal ulceration is present.  Cervix appears ulcerated and friable.  Uterus normal size, shape, and contour, nontender.  Adnexa nontender, no masses.  Normal anus and perineum.    Rectal Exam - not indicated.    Size 3 ring pessary with support removed.    A/P:  1.  Pelvic organ prolapse -pessary removed for upcoming pelvic ultrasound.  The patient is describing the pessary shifting when she walks.  There appears to be an ulceration on her cervix.  Recommend observation over the weekend, ultrasound on Monday, and repeat exam on Tuesday.  She may need a size 4 ring with support.  2. right-sided abdominal pain -pelvic ultrasound scheduled.    Follow-up Tuesday for pessary reinsertion.

## 2019-06-24 ENCOUNTER — HOSPITAL ENCOUNTER (OUTPATIENT)
Dept: RADIOLOGY | Facility: MEDICAL CENTER | Age: 74
End: 2019-06-24
Attending: OBSTETRICS & GYNECOLOGY
Payer: MEDICARE

## 2019-06-24 DIAGNOSIS — R10.9 ABDOMINAL DISCOMFORT: ICD-10-CM

## 2019-06-24 PROCEDURE — 76830 TRANSVAGINAL US NON-OB: CPT

## 2019-06-26 ENCOUNTER — HOSPITAL ENCOUNTER (OUTPATIENT)
Dept: LAB | Facility: MEDICAL CENTER | Age: 74
End: 2019-06-26
Attending: OBSTETRICS & GYNECOLOGY
Payer: MEDICARE

## 2019-06-26 ENCOUNTER — GYNECOLOGY VISIT (OUTPATIENT)
Dept: OBGYN | Facility: MEDICAL CENTER | Age: 74
End: 2019-06-26
Payer: MEDICARE

## 2019-06-26 VITALS
SYSTOLIC BLOOD PRESSURE: 120 MMHG | BODY MASS INDEX: 25.91 KG/M2 | DIASTOLIC BLOOD PRESSURE: 80 MMHG | WEIGHT: 171 LBS | HEIGHT: 68 IN

## 2019-06-26 DIAGNOSIS — R93.89 ENDOMETRIAL THICKENING ON ULTRASOUND: ICD-10-CM

## 2019-06-26 DIAGNOSIS — Z46.89 ENCOUNTER FOR PESSARY MAINTENANCE: ICD-10-CM

## 2019-06-26 DIAGNOSIS — N86 CERVICAL ULCERATION: ICD-10-CM

## 2019-06-26 DIAGNOSIS — N95.0 PMB (POSTMENOPAUSAL BLEEDING): ICD-10-CM

## 2019-06-26 DIAGNOSIS — Z12.4 CERVICAL CANCER SCREENING: ICD-10-CM

## 2019-06-26 LAB — PATHOLOGY CONSULT NOTE: NORMAL

## 2019-06-26 PROCEDURE — 57500 BIOPSY OF CERVIX: CPT | Performed by: OBSTETRICS & GYNECOLOGY

## 2019-06-26 PROCEDURE — 99213 OFFICE O/P EST LOW 20 MIN: CPT | Mod: 25 | Performed by: OBSTETRICS & GYNECOLOGY

## 2019-06-26 PROCEDURE — 88305 TISSUE EXAM BY PATHOLOGIST: CPT | Mod: 59

## 2019-06-26 PROCEDURE — 88175 CYTOPATH C/V AUTO FLUID REDO: CPT

## 2019-06-26 PROCEDURE — 87624 HPV HI-RISK TYP POOLED RSLT: CPT

## 2019-06-26 NOTE — NON-PROVIDER
Pt here to discuss U/S results and pessary insert and [facility]  Pt states no complaints  Good #[id number]  Pharmacy verified.

## 2019-06-26 NOTE — PROGRESS NOTES
"GYN Visit    CC: pessary maintenance, cervical ulceration      HPI: Rosenda Cowan is a 73 y.o.  here for f/u.  Had some abdominal discomfort at routine pessary cleaning with me 3/2019 for which an US was ordered.  Saw Dr. Condom  with complaints of R sided abdominal pain, worsening, and pain/spotting with pessary.  At that visit cervix was visualized to be ulcerated, pessary was removed and left out with plan for f/u exam today.  Pt also has had her US in the interim as below.       Doing well today but no change in R sided lower abdominal pain.  Denies VB, discharge.  Denies vaginal pain. Denies diarrhea/constipation, denies N/V.  Reports her paps were always normal.  Endorses approximately 3 weeks ago had one episode of light vaginal spotting, otherwise denies vaginal bleeding since menopause.      ROS:  constitutional: denies fevers, general concerns  GI: denies abd pain, N/V, diarrhea/constipation, blood in stool  :  vaginal bleeding as above, denies discharge, pain, denies urinary complaints    PMHx: HTN    Physical Exam:  /80 (BP Location: Left arm, Patient Position: Sitting)   Ht 1.727 m (5' 8\")   Wt 77.6 kg (171 lb)   LMP  (LMP Unknown)   Breastfeeding? No   BMI 26.00 kg/m²   gen: AAO, NAD, affect appropriate  abd: soft, NT, ND, no masses  : NEFG, normal urethral meatus, normal anus/perineum, normal vagina.  Cervix grossly normal with ulcerated area still apparent on posterior lip.  Uterus small, AV, no adnexal masses/tenderness  Skin: warm/dry, no lesions          19 TVUS:  FINDINGS:  Both transabdominal and transvaginal scanning were performed to optimally visualize the pelvis.    The uterus measures 2.96 cm x 4.88 cm x 3.83 cm.  The uterine myometrium is inhomogeneous.  The endometrial echo complex measures 1.07 cm. There is a small amount of fluid within the endometrial cavity.    Low resistive waveforms are confirmed within the ovaries.  The right ovary measures 1.35 cm " x 1.79 cm x 2.47 cm.    The left ovary measures 1.47 cm x 1.29 cm x 1.95 cm.    No adnexal mass identified.    There is trace free fluid seen.   Impression       Thickened endometrial stripe at 11 mm.    Unremarkable appearance of the ovaries.    Trace pelvic free fluid         EMB/cervical biopsy:  Biopsy attempted at posterior lip of cervix, ulceration.  Difficult to get good purchase with punch biopsy, minimal tissue collected however sent to pathology labeled a cervical biopsy.  Pap smear collected as well.  Cervix cleansed with betadine x3.  Ant lip grasped with tenaculum.  Pipelle passed easily through cervix, uterus sounded to 6.5cm.  3 passes were made with small tissue collected.  Specimen labeled and sent to pathology. All instruments removed with hemostasis noted.  Pt tolerated well.      A/P: 73 y.o.  with pelvic organ prolapse, thickened endometrium on ultrasound, posterior cervical ulceration  1. Encounter for pessary maintenance     2. Endometrial thickening on ultrasound  Consent for Surgery / Procedure     Discussed risks of procedures as above including pain, bleeding, infection.  Consent signed.  Low concern for malignancy, however given appearance of endometrium on ultrasound recommend endometrial biopsy which patient is amenable to today.  Does endorse episode of vaginal bleeding 3 weeks ago, previously thought to be related to cervical ulceration.  Denies history of abnormal Paps, cervical ulceration most likely related to pessary, however given persistence of the lesion biopsy attempted a Pap smear taken today.    Pessary left out today given biopsy, would prefer to give opportunity to heal.  Patient is leaving town on Monday, will return Friday for replacement.  Discussed that she would likely have some bleeding from the biopsy site now and also increased with replacement of the pessary, however likely will be better off with pessary in place for her travel.    Patient has follow-up  with me in approximately 2 weeks to discuss results.  We did discuss that should any of her biopsy be concerning for malignancy or show malignancy, she would be referred directly to GYN oncology.      Erin Gann MD  [facility], Women's [facility]

## 2019-06-27 LAB
CYTOLOGY REG CYTOL: NORMAL
HPV HR 12 DNA CVX QL NAA+PROBE: NEGATIVE
HPV16 DNA SPEC QL NAA+PROBE: NEGATIVE
HPV18 DNA SPEC QL NAA+PROBE: NEGATIVE
SPECIMEN SOURCE: NORMAL

## 2019-06-28 ENCOUNTER — GYNECOLOGY VISIT (OUTPATIENT)
Dept: OBGYN | Facility: MEDICAL CENTER | Age: 74
End: 2019-06-28
Payer: MEDICARE

## 2019-06-28 VITALS — WEIGHT: 172 LBS | DIASTOLIC BLOOD PRESSURE: 72 MMHG | BODY MASS INDEX: 26.15 KG/M2 | SYSTOLIC BLOOD PRESSURE: 117 MMHG

## 2019-06-28 DIAGNOSIS — N81.9 FEMALE GENITAL PROLAPSE, UNSPECIFIED TYPE: ICD-10-CM

## 2019-06-28 PROCEDURE — 99212 OFFICE O/P EST SF 10 MIN: CPT | Performed by: ADVANCED PRACTICE MIDWIFE

## 2019-06-28 NOTE — PROGRESS NOTES
Rosenda Cowan is a 73 y.o. female who presents for placement of pessary        HPI Comments: Pt presents for pessary placement and cervical check after having biopsy and EMB. She reports continued cramping at this time as well as light vaginal bleeding.  No LMP recorded (lmp unknown). Patient is postmenopausal.     Review of Systems   Pertinent positives documented in HPI and all other systems reviewed & are negative      History reviewed. No pertinent past medical history.    Medications:   Current Outpatient Prescriptions Ordered in [facility]   Medication Sig Dispense Refill   • LYRICA 150 MG Cap Take 1 Cap by mouth 3 times a day.     • atorvastatin (LIPITOR) 20 MG Tab Take 1 Tab by mouth every evening. 90 Tab 2   • oxycodone-acetaminophen (PERCOCET) 5-325 MG Tab Take 1-2 Tabs by mouth every four hours as needed.     • lisinopril (PRINIVIL) 5 MG Tab Take 5 mg by mouth every day.       No current [facility]-ordered facility-administered medications on file.           Objective:   Vital measurements:  /72   Wt 78 kg (172 lb)   Body mass index is 26.15 kg/m². (Goal BM I>18 <25)    Physical Exam   Nursing note and vitals reviewed.  Constitutional: She is oriented to person, place, and time. She appears well-developed and well-nourished. No distress.     Abdominal: Soft. Bowel sounds are normal. She exhibits no distension and no mass. No tenderness. She has no rebound and no guarding.     Genitourinary:  Pelvic exam was performed with patient supine.  External genitalia with no abnormal pigmentation, labial fusion,rash, tenderness, lesion or injury to the labia bilaterally.  Vagina is moist with no lesions, foul discharge, erythema, or tenderness.  No foreign body around the vagina or signs of injury.   Cervix exhibits friability and blood is noted coming from cervical os.   Bimanual exam deferred. Cystocele present.     Musculoskeletal: Normal range of motion. She exhibits no edema and no tenderness.     Skin: Skin is  warm and dry. No rash noted. She is not diaphoretic. No erythema. No pallor.     Psychiatric: She has a normal mood and affect. Her behavior is normal. Judgment and thought content normal.               Assessment:     1. Female genital prolapse, unspecified type         Plan:   1. Discussed in detail with patient after speculum exam my findings. At this time, inappropriate to replace pessary related to extended time needed for healing. She is to monitor bleeding at this time and return in 1 week for reexamination and likely placement.  Bleeding precautions reviewed with patient. She reports understanding.

## 2019-07-03 ENCOUNTER — GYNECOLOGY VISIT (OUTPATIENT)
Dept: OBGYN | Facility: MEDICAL CENTER | Age: 74
End: 2019-07-03
Payer: MEDICARE

## 2019-07-03 VITALS
DIASTOLIC BLOOD PRESSURE: 84 MMHG | BODY MASS INDEX: 26.07 KG/M2 | HEIGHT: 68 IN | WEIGHT: 172 LBS | SYSTOLIC BLOOD PRESSURE: 130 MMHG

## 2019-07-03 DIAGNOSIS — N81.4 CYSTOCELE WITH UTERINE PROLAPSE: ICD-10-CM

## 2019-07-03 DIAGNOSIS — N81.6 RECTOCELE: ICD-10-CM

## 2019-07-03 PROCEDURE — 99213 OFFICE O/P EST LOW 20 MIN: CPT | Performed by: OBSTETRICS & GYNECOLOGY

## 2019-07-03 NOTE — PROGRESS NOTES
"Chief Complaint   Patient presents with   • Gynecologic Exam     pessary   Chief complaint: Pessary cleaning and reinsertion      History of present illness: 73 y.o. presents to office for pessary cleaning and reinsertion.  Patient seen for endometrial biopsy due to thickened endometrium.  At that time she was noted to have some ulceration on her cervix.  Pessary not replaced at that time.    She was having some bleeding before hand which has now greatly decreased.  She reports using one panty liner a day with some minimal spotting      Review of systems:  Pertinent positives documented in HPI and a comprehensive review of system is negative    All PMH, PSH, allergies, social history and FH reviewed and updated today:  No past medical history on file.    Past Surgical History:   Procedure Laterality Date   • APPENDECTOMY     • CHOLECYSTECTOMY     • TUBAL COAGULATION LAPAROSCOPIC BILATERAL         Allergies: No Known Allergies    Social History     Social History   • Marital status:      Spouse name: N/A   • Number of children: N/A   • Years of education: N/A     Occupational History   • Not on file.     Social History Main Topics   • Smoking status: Never Smoker   • Smokeless tobacco: Never Used   • Alcohol use Yes      Comment: 3 - 4 dirnks per month.    • Drug use: No   • Sexual activity: Yes     Partners: Male     Birth control/ protection: Post-Menopausal      Comment: .      Other Topics Concern   • Not on file     Social History Narrative   • No narrative on file       Family History   Problem Relation Age of Onset   • Hypertension Mother    • Heart Disease Father    • Hypertension Father    • Colon Cancer Father    • Other Sister         Intellectual Disability    • No Known Problems Daughter    • No Known Problems Daughter        Physical exam:  /84 (BP Location: Left arm, Patient Position: Sitting)   Ht 1.727 m (5' 8\")   Wt 78 kg (172 lb)     GENERAL APPEARANCE: healthy, alert, no " distress  FEMALE GYN: normal female external genitalia without lesions, BUS normal, no vaginal discharge noted, vulva pink without erythema or friability, urethra is normal without discharge or scarring, no bladder fullness or masses, normal external genitalia, no erythema, no discharge, normal uterus, size and consistency, normal adnexa without tenderness, cervix has slight ulceration on it, no active bleeding noted, normal anus and perineum.  NEURO Awake, alert and oriented x 3, Normal gait, no sensory deficits  SKIN No rashes, or ulcers or lesions seen  PSYCHIATRIC: Patient shows appropriate affect, is alert and oriented x3, intact judgment and insight.      Assessment:  1. Cystocele with uterine prolapse     2. Rectocele         Plan:    Given the very slight ulceration noted on the cervix, I discussed with the patient the possibility of reinsertion of the pessary.  Patient would like to have pessary reinserted at this time.    Pessary was cleaned and then reinserted without problems    Follow-up on July 16 as previously scheduled    Questions answered    Spent  15 minutes in face-to-face patient contact in which greater than 50% of that visit was spent in counseling/coordination of care including medical and surgical options of care.

## 2019-07-10 ENCOUNTER — TELEPHONE (OUTPATIENT)
Dept: OBGYN | Facility: CLINIC | Age: 74
End: 2019-07-10

## 2019-07-10 NOTE — TELEPHONE ENCOUNTER
LVM to callback  ----- Message from Erin Gann M.D. sent at 7/1/2019  2:51 PM PDT -----  Normal biopsies without endometrial stroma, if without additional VB consider observation.   Will discuss at upcoming visit.      MA: please let pt know her biopsies came back normal.  We did not get a lot of endometrial tissue from her uterus, which happens often in postmenopausal women.  We can discuss at her upcoming follow-up visit with me whether or not we want to observe her clinically for additional bleeding, consider repeating the ultrasound, or consider additional attempt at obtaining more tissue.

## 2019-07-11 NOTE — TELEPHONE ENCOUNTER
Pt returned call.  Was informed of Bx results, will discuss on her next f/u (7/16/19) w/provider and consider u/s vs repeat Bx.

## 2019-07-12 ENCOUNTER — TELEPHONE (OUTPATIENT)
Dept: OBGYN | Facility: CLINIC | Age: 74
End: 2019-07-12

## 2019-07-12 NOTE — TELEPHONE ENCOUNTER
----- Message from Erin Gann M.D. sent at 7/1/2019  2:51 PM PDT -----  Normal biopsies without endometrial stroma, if without additional VB consider observation.   Will discuss at upcoming visit.      MA: please let pt know her biopsies came back normal.  We did not get a lot of endometrial tissue from her uterus, which happens often in postmenopausal women.  We can discuss at her upcoming follow-up visit with me whether or not we want to observe her clinically for additional bleeding, consider repeating the ultrasound, or consider additional attempt at obtaining more tissue.      Called patient, no answer. LVM asking patient to call us back to go over her most recent test results.

## 2019-07-16 ENCOUNTER — GYNECOLOGY VISIT (OUTPATIENT)
Dept: OBGYN | Facility: MEDICAL CENTER | Age: 74
End: 2019-07-16
Payer: MEDICARE

## 2019-07-16 VITALS
SYSTOLIC BLOOD PRESSURE: 140 MMHG | DIASTOLIC BLOOD PRESSURE: 82 MMHG | WEIGHT: 171 LBS | HEIGHT: 68 IN | BODY MASS INDEX: 25.91 KG/M2

## 2019-07-16 DIAGNOSIS — N95.0 PMB (POSTMENOPAUSAL BLEEDING): ICD-10-CM

## 2019-07-16 DIAGNOSIS — N81.9 FEMALE GENITAL PROLAPSE, UNSPECIFIED TYPE: ICD-10-CM

## 2019-07-16 PROCEDURE — 99213 OFFICE O/P EST LOW 20 MIN: CPT | Performed by: OBSTETRICS & GYNECOLOGY

## 2019-07-16 NOTE — PROGRESS NOTES
"GYN Visit     CC: Follow-up/discuss results    HPI: Rosenda Cowan is a 73 y.o.  with pessary in place here for follow-up.  Patient had right-sided abdominal pain for which pelvic ultrasound was done.  Endometrial stripe on the ultrasound was 11 mm so decision was made to proceed with endometrial biopsy.  Patient also been complaining of some pain with pessary and vaginal spotting, which was thought to be related to pessary.  Patient had a small ulcer on cervix, which was biopsied and for which a Pap smear was performed.  After biopsy of cervix, pessary was left out for several weeks to allow healing.  Pessary was replaced 7/3/2019 and patient is here today for follow-up.    Patient reports doing well, had some spotting after reinsertion of pessary, however has had no vaginal bleeding since then.  Is feeling well today and is without concerns.    ROS:  constitutional: denies general concerns  : denies vaginal bleeding, discharge, pain    PMHx: HTN      Past Surgical History:   Procedure Laterality Date   • APPENDECTOMY     • CHOLECYSTECTOMY     • TUBAL COAGULATION LAPAROSCOPIC BILATERAL           Physical Exam:  /82   Ht 1.727 m (5' 8\")   Wt 77.6 kg (171 lb)   LMP  (LMP Unknown)   Breastfeeding? No   BMI 26.00 kg/m²   gen: AAO, NAD, mood and affect appropriate    19:  Pap: ASCUS/HPV neg  A. Endometrial biopsy:         Strips of benign endometrial glandular epithelium without stroma          compatible with atrophy.         No intact endometrial with stroma is present for evaluation.         No atypia or malignancy identified in a limited specimen.         Deeper level examined.  B. Cervix biopsy:         Small fragments of benign ectocervical squamous mucosa and          adjacent endocervical glandular mucosa demonstrating focal          squamous metaplasia and mild to moderate acute and chronic          inflammation with reactive changes.         No dysplasia or malignancy " identified.    A/P: 73 y.o.  with pessary in place for pelvic organ prolapse  -Also healing on last exam, pessary in place and patient denies any further concerns at this time with bleeding, pain, discharge.  Discussed pathology as above.  Endometrial biopsy compatible with atrophy, discussed that there is not a lot of tissue obtained, however this is common in postmenopausal women.  Discussed that cannot guarantee that there is absolutely no risk of cancer, but given that she has had no further bleeding and there is another obvious source of light bleeding (ulceration on cervix) I feel comfortable clinically observing her for now if she is okay with that.  Discussed that if she did have return of bleeding, would want her to return to clinic right away and would recommend moving forward with an OR hysteroscopy, D&C for additional endometrial evaluation.    Patient present understanding.  Okay with clinical monitoring for now.  To return to 3 months for pessary maintenance, earlier if with bleeding or other concerns      Erin Gann MD  [facility], Women's Health

## 2019-08-29 ENCOUNTER — HOSPITAL ENCOUNTER (OUTPATIENT)
Facility: MEDICAL CENTER | Age: 74
End: 2019-08-29
Attending: PHYSICIAN ASSISTANT
Payer: MEDICARE

## 2019-08-29 ENCOUNTER — OFFICE VISIT (OUTPATIENT)
Dept: URGENT CARE | Facility: CLINIC | Age: 74
End: 2019-08-29
Payer: MEDICARE

## 2019-08-29 VITALS
HEIGHT: 66 IN | RESPIRATION RATE: 16 BRPM | BODY MASS INDEX: 27.9 KG/M2 | DIASTOLIC BLOOD PRESSURE: 82 MMHG | OXYGEN SATURATION: 95 % | HEART RATE: 68 BPM | SYSTOLIC BLOOD PRESSURE: 120 MMHG | TEMPERATURE: 97.1 F | WEIGHT: 173.6 LBS

## 2019-08-29 DIAGNOSIS — N30.01 ACUTE CYSTITIS WITH HEMATURIA: ICD-10-CM

## 2019-08-29 PROCEDURE — 87077 CULTURE AEROBIC IDENTIFY: CPT

## 2019-08-29 PROCEDURE — 87086 URINE CULTURE/COLONY COUNT: CPT

## 2019-08-29 PROCEDURE — 99214 OFFICE O/P EST MOD 30 MIN: CPT | Performed by: PHYSICIAN ASSISTANT

## 2019-08-29 PROCEDURE — 87186 SC STD MICRODIL/AGAR DIL: CPT

## 2019-08-29 RX ORDER — CEFUROXIME AXETIL 250 MG/1
250 TABLET ORAL 2 TIMES DAILY
Qty: 14 TAB | Refills: 0 | Status: SHIPPED | OUTPATIENT
Start: 2019-08-29 | End: 2019-09-05

## 2019-08-29 NOTE — PROGRESS NOTES
Chief Complaint   Patient presents with   • Dysuria     x 3 days, Pain with urination, urinary frequency and lower back pain       HISTORY OF PRESENT ILLNESS: Patient is a 73 y.o. female who presents today for 3 days of not improving UTI symptoms.  She is having increased frequency, urgency and dysuria.   She notes some bilateral lower back pain and this is worsened by the fact she has to get up a lot at night to urinate.   No hematuria.  No nausea, vomiting, fevers.    Took Azo with mild relief.     Patient Active Problem List    Diagnosis Date Noted   • Post herpetic neuralgia 2019   • Essential hypertension 2019   • Rectocele 2019   • Cystocele with uterine prolapse 2017       Allergies:Patient has no known allergies.    Current Outpatient Medications Ordered in Epic   Medication Sig Dispense Refill   • Phenazopyridine HCl (AZO TABS PO) Take  by mouth.     • LYRICA 150 MG Cap Take 1 Cap by mouth 3 times a day.     • oxycodone-acetaminophen (PERCOCET) 5-325 MG Tab Take 1-2 Tabs by mouth every four hours as needed.     • atorvastatin (LIPITOR) 20 MG Tab Take 1 Tab by mouth every evening. (Patient not taking: Reported on 2019) 90 Tab 2   • lisinopril (PRINIVIL) 5 MG Tab Take 5 mg by mouth every day.       No current [facility]-ordered facility-administered medications on file.        Past Medical History:   Diagnosis Date   • Hypertension        Social History     Tobacco Use   • Smoking status: Never Smoker   • Smokeless tobacco: Never Used   Substance Use Topics   • Alcohol use: Yes     Comment: 3 - 4 dirnks per month.    • Drug use: No       Family Status   Relation Name Status   • Mo     • Fa     • Sis  Alive   • Sis  Alive   • Sis  Alive   • Bridger  Alive   • Bridger  Alive     Family History   Problem Relation Age of Onset   • Hypertension Mother    • Heart Disease Father    • Hypertension Father    • Colon Cancer Father    • Other Sister         Intellectual Disability    • No  "Known Problems Daughter    • No Known Problems Daughter        ROS:  Review of Systems   Constitutional: Negative for fever, chills, weight loss and malaise/fatigue.   HENT: Negative for ear pain, nosebleeds, congestion, sore throat and neck pain.    Eyes: Negative for blurred vision.   Respiratory: Negative for cough, sputum production, shortness of breath and wheezing.    Cardiovascular: Negative for chest pain, palpitations, orthopnea and leg swelling.   Gastrointestinal: Negative for heartburn, nausea, vomiting and abdominal pain.   Genitourinary: SEE HPI    Exam:  /82 (BP Location: Left arm, Patient Position: Sitting, BP Cuff Size: Adult)   Pulse 68   Temp 36.2 °C (97.1 °F) (Temporal)   Resp 16   Ht 1.664 m (5' 5.5\")   Wt 78.7 kg (173 lb 9.6 oz)   SpO2 95%   General:  Well nourished, well developed female in NAD  Eyes: PERRLA, EOM within normal limits, no conjunctival injection, no scleral icterus, visual fields and acuity grossly intact.  Mouth: reasonable hygiene, no erythema exudates or tonsillar enlargement.  Neck: no masses, range of motion within normal limits, no tracheal deviation. No lymphadenopathy  Pulmonary: Normal respiratory effort, no wheezes, crackles, or rhonchi.  Cardiovascular: regular rate and rhythm without murmurs, rubs, or gallops.  Abdomen: Soft, nontender, nondistended. Normal bowel sounds. No hepatosplenomegaly or masses, or hernias. No rebound or guarding. No CVA tenderness.   Skin: No visible rashes or lesion. Warm, pink, dry.   Extremities: no clubbing, cyanosis, or edema.  Neuro: A&O x 3. Speech normal/clear.  Normal gait.         Assessment/Plan:  1. Acute cystitis with hematuria  URINE CULTURE(NEW)    cefUROXime (CEFTIN) 250 MG Tab       -U/A with Azo however able to see blood, leuks.   -culture sent  -Fluids emphasized.    -signs and symptoms of worsening/ascending infection discussed and to seek prompt medical care should they arise.      Supportive care, " differential diagnoses, and indications for immediate follow-up discussed with patient.   Pathogenesis of diagnosis discussed including typical length and natural progression.   Instructed to return to clinic or nearest emergency department for any change in condition, further concerns, or worsening of symptoms.  Patient states understanding of the plan of care and discharge instructions.        Cyndi Melgar P.A.-C.

## 2019-09-23 ENCOUNTER — GYNECOLOGY VISIT (OUTPATIENT)
Dept: OBGYN | Facility: MEDICAL CENTER | Age: 74
End: 2019-09-23
Payer: MEDICARE

## 2019-09-23 VITALS — SYSTOLIC BLOOD PRESSURE: 140 MMHG | WEIGHT: 173 LBS | DIASTOLIC BLOOD PRESSURE: 84 MMHG | BODY MASS INDEX: 28.35 KG/M2

## 2019-09-23 DIAGNOSIS — Z01.818 PREOP EXAMINATION: ICD-10-CM

## 2019-09-23 DIAGNOSIS — N95.0 POSTMENOPAUSAL BLEEDING: ICD-10-CM

## 2019-09-23 DIAGNOSIS — R06.02 SHORTNESS OF BREATH: ICD-10-CM

## 2019-09-23 PROCEDURE — 99214 OFFICE O/P EST MOD 30 MIN: CPT | Performed by: OBSTETRICS & GYNECOLOGY

## 2019-09-23 NOTE — PROGRESS NOTES
GYN Visit    CC: pessary maintenance, postmenopausal bleeding      HPI: 73 y.o.  here for follow-up of postmenopausal bleeding as well as pessary maintenance.    Previously small amount of spotting, ultrasound was done which revealed an 11 mm endometrial stripe.  Endometrial biopsy was performed with benign endometrial glandular epithelium but no stroma available.  Plan at that time for clinical observation.  Patient also had cervical biopsy for small abnormality visualized which was normal.  Pap showed ASCUS, HPV negative.    Patient reports that she has had some spotting, mostly with physical activity like when she is gardening or doing other active things.  No heavy bleeding.  Mostly just notices with wiping or lightly on a pad.    Occasionally feels sore or mildly crampy in the lower abdomen.      PMHx: HTN  Meds: reports only lyrica, used to be on lisinopril but reports hasn't needed this.      ROS:  Gen: denies general concerns  : Vaginal spotting as above, denies discharge/pain.    /84 (BP Location: Left arm, Patient Position: Sitting)   Wt 78.5 kg (173 lb)   LMP  (LMP Unknown)   Breastfeeding? No   BMI 28.35 kg/m²   Gen: AAO, NAD  : NEFG, normal vagina and cervix, no erosions; small pink-tinged discharge noted.    Pessary removed, cleaned and replaced easily    A/P: 73 y.o. F with postmenopausal bleeding, pelvic organ prolapse     Given continued vaginal bleeding, although light, recommend additional endometrial evaluation with hysteroscopy, D&C.  Patient amenable.  Referral placed today.  If unable to get scheduled in the next approximately 1 month on a regular OR day, discussed with patient that I do not want her to wait a significant amount of time for this evaluation and we may be able to schedule it as an early morning case before I am in the office one day.  Discussed briefly risks of surgery, primarily anesthetic risk, but also risk of uterine perforation.    Patient is seeing her  primary care at the beginning of October, I asked her to discuss with them her upcoming procedure.  Chest x-ray/EKG prescription given.  She has a history of hypertension, mild range blood pressures today, reports that she is no longer on hypertensive medication.    We will have patient follow-up for preoperative visit once procedure scheduled.    Erin Gann MD  [facility], Women's Health

## 2019-10-01 ENCOUNTER — OFFICE VISIT (OUTPATIENT)
Dept: MEDICAL GROUP | Facility: PHYSICIAN GROUP | Age: 74
End: 2019-10-01
Payer: MEDICARE

## 2019-10-01 VITALS
SYSTOLIC BLOOD PRESSURE: 124 MMHG | RESPIRATION RATE: 20 BRPM | WEIGHT: 177.2 LBS | HEIGHT: 66 IN | HEART RATE: 72 BPM | DIASTOLIC BLOOD PRESSURE: 76 MMHG | TEMPERATURE: 99.2 F | BODY MASS INDEX: 28.48 KG/M2 | OXYGEN SATURATION: 95 %

## 2019-10-01 DIAGNOSIS — E55.9 VITAMIN D DEFICIENCY: ICD-10-CM

## 2019-10-01 DIAGNOSIS — I10 ESSENTIAL HYPERTENSION: ICD-10-CM

## 2019-10-01 DIAGNOSIS — Z78.0 POST-MENOPAUSAL: ICD-10-CM

## 2019-10-01 PROCEDURE — 99213 OFFICE O/P EST LOW 20 MIN: CPT | Performed by: PHYSICIAN ASSISTANT

## 2019-10-01 NOTE — LETTER
[facility]  Sosa Smalls P.A.-C.  [address]  [place] [address]  Fax: [phone]   Authorization for Release/Disclosure of   Protected Health Information   Name: KEKE VILLAR : 1945 SSN: [id number]   Address: [address] [place] [address] [place] [address] Phone:    [phone] (home)    I authorize the entity listed below to release/disclose the PHI below to:   [facility]/Sosa Smalls P.A.-C. and Sosa Smalls P.A.-C.   Provider or Entity Name:     Address   City, State, Zip   Phone:      Fax:     Reason for request: continuity of care   Information to be released:    [  ] LAST COLONOSCOPY,  including any PATH REPORT and follow-up  [  ] LAST FIT/COLOGUARD RESULT [  ] LAST DEXA  [  ] LAST MAMMOGRAM  [  ] LAST PAP  [  ] LAST LABS [  ] RETINA EXAM REPORT  [  ] IMMUNIZATION RECORDS  [  ] Release all info      [  ] Check here and initial the line next to each item to release ALL health information INCLUDING  _____ Care and treatment for drug and / or alcohol abuse  _____ HIV testing, infection status, or AIDS  _____ Genetic Testing    DATES OF SERVICE OR TIME PERIOD TO BE DISCLOSED: _____________  I understand and acknowledge that:  * This Authorization may be revoked at any time by you in writing, except if your health information has already been used or disclosed.  * Your health information that will be used or disclosed as a result of you signing this authorization could be re-disclosed by the recipient. If this occurs, your re-disclosed health information may no longer be protected by State or Federal laws.  * You may refuse to sign this Authorization. Your refusal will not affect your ability to obtain treatment.  * This Authorization becomes effective upon signing and will  on (date) __________.      If no date is indicated, this Authorization will  one (1) year from the signature date.    Name: Keke Villar    Signature:   Date:     10/1/2019       PLEASE FAX REQUESTED RECORDS  BACK TO: [phone]

## 2019-10-01 NOTE — PROGRESS NOTES
Chief Complaint   Patient presents with   • Establish Care     Prior PCP         HISTORY OF PRESENT ILLNESS: Rosenda Cowan is an established 73 y.o. female here to discuss the evaluation and management of:    Essential hypertension  Stable.  Patient is not on antihypertensive medications.  She discontinued lisinopril 5 mg tab once daily several months ago.  Patient's blood pressure during today's appointment 124/76 mmHg.  States overall she is feeling well.  Denies chest pain, shortness of breath, heart palpitations, dizziness, syncope, severe headache, vision changes.    Post-menopausal  Patient is postmenopausal.  Will order vitamin D lab work and DEXA scan to further evaluate patient.  She denies taking over-the-counter vitamin D or calcium supplementation.    Vitamin D deficiency  See above.         Patient Active Problem List    Diagnosis Date Noted   • Post herpetic neuralgia 2019   • Essential hypertension 2019   • Rectocele 2019   • Cystocele with uterine prolapse 2017       Allergies:Patient has no known allergies.    Current Outpatient Medications   Medication Sig Dispense Refill   • LYRICA 150 MG Cap Take 1 Cap by mouth 3 times a day.     • oxycodone-acetaminophen (PERCOCET) 5-325 MG Tab Take 1-2 Tabs by mouth every four hours as needed.       No current facility-administered medications for this visit.        Social History     Tobacco Use   • Smoking status: Never Smoker   • Smokeless tobacco: Never Used   Substance Use Topics   • Alcohol use: Yes     Comment: 3 - 4 dirnks per month.    • Drug use: No       Family Status   Relation Name Status   • Mo     • Fa     • Sis  Alive   • Sis  Alive   • Sis  Alive   • Bridger  Alive   • Bridger  Alive     Family History   Problem Relation Age of Onset   • Hypertension Mother    • Heart Disease Father    • Hypertension Father    • Colon Cancer Father    • Other Sister         Intellectual Disability    • No Known Problems  "Daughter    • No Known Problems Daughter        ROS:  Review of Systems   Constitutional: Negative for fever, chills, weight loss and malaise/fatigue.   HENT: Negative for ear pain, nosebleeds, congestion, sore throat and neck pain.    Eyes: Negative for blurred vision.   Respiratory: Negative for cough, sputum production, shortness of breath and wheezing.    Cardiovascular: Negative for chest pain, palpitations, orthopnea and leg swelling.   Gastrointestinal: Negative for heartburn, nausea, vomiting and abdominal pain.   Genitourinary: Negative for dysuria, urgency and frequency.   Musculoskeletal: Negative for myalgias, back pain and joint pain.   Skin: Negative for rash and itching.   Neurological: Negative for dizziness, tingling, tremors, sensory change, focal weakness and headaches.   Endo/Heme/Allergies: Does not bruise/bleed easily.   Psychiatric/Behavioral: Negative for depression, suicidal ideas and memory loss.  The patient is not nervous/anxious and does not have insomnia.    All other systems reviewed and are negative except as in HPI.    Exam: /76 (BP Location: Left arm, Patient Position: Sitting, BP Cuff Size: Adult)   Pulse 72   Temp 37.3 °C (99.2 °F) (Temporal)   Resp 20   Ht 1.664 m (5' 5.5\")   Wt 80.4 kg (177 lb 3.2 oz)   SpO2 95%  Body mass index is 29.04 kg/m².  General: Normal appearing. No distress.  HEENT: Normocephalic. Eyes conjunctiva clear lids without ptosis, ears normal shape and contour.  Neck: Supple without JVD. Thyroid is not enlarged.  Pulmonary: Clear to ausculation.  Normal effort. No rales, ronchi, or wheezing.  Cardiovascular: Regular rate and rhythm without murmur.   Abdomen: Soft, nontender, nondistended. Normal bowel sounds. Liver and spleen are not palpable  Neurologic: Grossly nonfocal.  Cranial nerves are normal.   Lymph: No cervical, supraclavicular  lymph nodes are palpable  Skin: Warm and dry.  No rashes or suspicious skin lesions.  Musculoskeletal: Normal " gait. No extremity cyanosis, clubbing, or edema.  Psych: Normal mood and affect. Alert and oriented x3. Judgment and insight is normal.    Medical decision-making and discussion:  1. Essential hypertension  Continue to monitor.  Patient's blood pressure during today's appointment 124/76 on Hg.  Continue work on diet, exercise, and sleep hygiene.    - CBC WITH DIFFERENTIAL; Future    2. Post-menopausal    - DS-BONE DENSITY STUDY (DEXA); Future    3. Vitamin D deficiency  Digestive patient take over-the-counter 1-2000 units of vitamin D once daily.  Advised patient if she is not getting enough calcium in her diet (1200mg) splint with 500-1000 mg once daily.  She will follow-up in 3 months for reevaluation.  - VITAMIN D,25 HYDROXY; Future      Please note that this dictation was created using voice recognition software. I have made every reasonable attempt to correct obvious errors, but I expect that there are errors of grammar and possibly content that I did not discover before finalizing the note.    Assessment/Plan:  1. Post herpetic neuralgia     2. Essential hypertension  CBC WITH DIFFERENTIAL   3. Post-menopausal  DS-BONE DENSITY STUDY (DEXA)   4. Vitamin D deficiency  VITAMIN D,25 HYDROXY       Return in about 3 months (around 1/1/2020), or if symptoms worsen or fail to improve.

## 2019-10-07 ENCOUNTER — HOSPITAL ENCOUNTER (OUTPATIENT)
Dept: LAB | Facility: MEDICAL CENTER | Age: 74
End: 2019-10-07
Attending: PHYSICIAN ASSISTANT
Payer: MEDICARE

## 2019-10-07 DIAGNOSIS — E55.9 VITAMIN D DEFICIENCY: ICD-10-CM

## 2019-10-07 DIAGNOSIS — I10 ESSENTIAL HYPERTENSION: ICD-10-CM

## 2019-10-07 DIAGNOSIS — E78.2 MIXED HYPERLIPIDEMIA: ICD-10-CM

## 2019-10-07 LAB
ALBUMIN SERPL BCP-MCNC: 4.1 G/DL (ref 3.2–4.9)
ALBUMIN/GLOB SERPL: 1.2 G/DL
ALP SERPL-CCNC: 82 U/L (ref 30–99)
ALT SERPL-CCNC: 18 U/L (ref 2–50)
ANION GAP SERPL CALC-SCNC: 8 MMOL/L (ref 0–11.9)
AST SERPL-CCNC: 25 U/L (ref 12–45)
BASOPHILS # BLD AUTO: 1.1 % (ref 0–1.8)
BASOPHILS # BLD: 0.04 K/UL (ref 0–0.12)
BILIRUB SERPL-MCNC: 0.7 MG/DL (ref 0.1–1.5)
BUN SERPL-MCNC: 14 MG/DL (ref 8–22)
CALCIUM SERPL-MCNC: 8.7 MG/DL (ref 8.5–10.5)
CHLORIDE SERPL-SCNC: 107 MMOL/L (ref 96–112)
CHOLEST SERPL-MCNC: 189 MG/DL (ref 100–199)
CO2 SERPL-SCNC: 27 MMOL/L (ref 20–33)
CREAT SERPL-MCNC: 0.71 MG/DL (ref 0.5–1.4)
EOSINOPHIL # BLD AUTO: 0.12 K/UL (ref 0–0.51)
EOSINOPHIL NFR BLD: 3.2 % (ref 0–6.9)
ERYTHROCYTE [DISTWIDTH] IN BLOOD BY AUTOMATED COUNT: 46.3 FL (ref 35.9–50)
GLOBULIN SER CALC-MCNC: 3.5 G/DL (ref 1.9–3.5)
GLUCOSE SERPL-MCNC: 80 MG/DL (ref 65–99)
HCT VFR BLD AUTO: 42.1 % (ref 37–47)
HDLC SERPL-MCNC: 48 MG/DL
HGB BLD-MCNC: 13.9 G/DL (ref 12–16)
IMM GRANULOCYTES # BLD AUTO: 0.01 K/UL (ref 0–0.11)
IMM GRANULOCYTES NFR BLD AUTO: 0.3 % (ref 0–0.9)
LDLC SERPL CALC-MCNC: 125 MG/DL
LYMPHOCYTES # BLD AUTO: 1.73 K/UL (ref 1–4.8)
LYMPHOCYTES NFR BLD: 46 % (ref 22–41)
MCH RBC QN AUTO: 31 PG (ref 27–33)
MCHC RBC AUTO-ENTMCNC: 33 G/DL (ref 33.6–35)
MCV RBC AUTO: 93.8 FL (ref 81.4–97.8)
MONOCYTES # BLD AUTO: 0.37 K/UL (ref 0–0.85)
MONOCYTES NFR BLD AUTO: 9.8 % (ref 0–13.4)
NEUTROPHILS # BLD AUTO: 1.49 K/UL (ref 2–7.15)
NEUTROPHILS NFR BLD: 39.6 % (ref 44–72)
NRBC # BLD AUTO: 0 K/UL
NRBC BLD-RTO: 0 /100 WBC
PLATELET # BLD AUTO: 248 K/UL (ref 164–446)
PMV BLD AUTO: 10.2 FL (ref 9–12.9)
POTASSIUM SERPL-SCNC: 3.7 MMOL/L (ref 3.6–5.5)
PROT SERPL-MCNC: 7.6 G/DL (ref 6–8.2)
RBC # BLD AUTO: 4.49 M/UL (ref 4.2–5.4)
SODIUM SERPL-SCNC: 142 MMOL/L (ref 135–145)
TRIGL SERPL-MCNC: 80 MG/DL (ref 0–149)
WBC # BLD AUTO: 3.8 K/UL (ref 4.8–10.8)

## 2019-10-07 PROCEDURE — 36415 COLL VENOUS BLD VENIPUNCTURE: CPT

## 2019-10-07 PROCEDURE — 82306 VITAMIN D 25 HYDROXY: CPT

## 2019-10-07 PROCEDURE — 85025 COMPLETE CBC W/AUTO DIFF WBC: CPT

## 2019-10-07 PROCEDURE — 80061 LIPID PANEL: CPT

## 2019-10-07 PROCEDURE — 80053 COMPREHEN METABOLIC PANEL: CPT

## 2019-10-08 DIAGNOSIS — D72.819 LEUKOPENIA, UNSPECIFIED TYPE: ICD-10-CM

## 2019-10-08 LAB — 25(OH)D3 SERPL-MCNC: 34 NG/ML (ref 30–100)

## 2019-10-09 ENCOUNTER — APPOINTMENT (OUTPATIENT)
Dept: RADIOLOGY | Facility: MEDICAL CENTER | Age: 74
End: 2019-10-09
Attending: PHYSICIAN ASSISTANT
Payer: MEDICARE

## 2019-10-09 ENCOUNTER — HOSPITAL ENCOUNTER (OUTPATIENT)
Dept: RADIOLOGY | Facility: MEDICAL CENTER | Age: 74
End: 2019-10-09
Attending: OBSTETRICS & GYNECOLOGY
Payer: MEDICARE

## 2019-10-09 DIAGNOSIS — R06.02 SHORTNESS OF BREATH: ICD-10-CM

## 2019-10-09 DIAGNOSIS — Z01.818 PREOP EXAMINATION: ICD-10-CM

## 2019-10-09 PROCEDURE — 71046 X-RAY EXAM CHEST 2 VIEWS: CPT

## 2019-10-10 ENCOUNTER — HOSPITAL ENCOUNTER (OUTPATIENT)
Dept: CARDIOLOGY | Facility: MEDICAL CENTER | Age: 74
End: 2019-10-10
Attending: OBSTETRICS & GYNECOLOGY
Payer: MEDICARE

## 2019-10-10 LAB — EKG IMPRESSION: NORMAL

## 2019-10-10 PROCEDURE — 93005 ELECTROCARDIOGRAM TRACING: CPT | Performed by: OBSTETRICS & GYNECOLOGY

## 2019-10-10 PROCEDURE — 93010 ELECTROCARDIOGRAM REPORT: CPT | Performed by: INTERNAL MEDICINE

## 2019-10-15 ENCOUNTER — GYNECOLOGY VISIT (OUTPATIENT)
Dept: OBGYN | Facility: MEDICAL CENTER | Age: 74
End: 2019-10-15
Payer: MEDICARE

## 2019-10-15 VITALS — SYSTOLIC BLOOD PRESSURE: 120 MMHG | WEIGHT: 172 LBS | DIASTOLIC BLOOD PRESSURE: 80 MMHG | BODY MASS INDEX: 28.19 KG/M2

## 2019-10-15 DIAGNOSIS — R93.89 THICKENED ENDOMETRIUM: ICD-10-CM

## 2019-10-15 DIAGNOSIS — N95.0 PMB (POSTMENOPAUSAL BLEEDING): ICD-10-CM

## 2019-10-15 PROCEDURE — 99213 OFFICE O/P EST LOW 20 MIN: CPT | Performed by: OBSTETRICS & GYNECOLOGY

## 2019-10-15 NOTE — PROGRESS NOTES
GYN Visit    CC: PMB      HPI: Rosenda Cowan is a 73 y.o.  for final discussion of planned hysteroscopy D&C for postmenopausal bleeding.    S/p normal EMB although without endometrial stroma, US as below with 1.07cm EMS.    Normal EKG/CXR recently; off meds for HTN currently.  Uses pessary for POP.    ROS:  constitutional: denies fevers, general concerns  CV: denies chest pain, palpitations, edema  Resp: denies shortness of breath, cough  GI: denies abd pain, N/V, diarrhea/constipation, blood in stool  :  Similar spotting as above, discharge, pain, denies urinary complaints  Neuro: denies HAs, numbness/weakness  Endo: denies significant weight changes, irregular menses, temperature intolerance, denies hotflashes/nightsweats  Heme/lymph: denies hx of blood transfusion  Psych: denies anxiety/depression  Allergy: denies concerns    OB History    Para Term  AB Living   2 2 2     2   SAB TAB Ectopic Molar Multiple Live Births             2      # Outcome Date GA Lbr Brayan/2nd Weight Sex Delivery Anes PTL Lv   2 Term      Vag-Spont   OSIRIS   1 Term      Vag-Spont   OSIRIS       GYN Hx:   No abnl paps, last this year ASCUS/HPV neg      Past Medical History:   Diagnosis Date   • Hypertension        Past Surgical History:   Procedure Laterality Date   • APPENDECTOMY     • CHOLECYSTECTOMY     • TUBAL COAGULATION LAPAROSCOPIC BILATERAL         Medications:   Current Outpatient Medications Ordered in Epic   Medication Sig Dispense Refill   • LYRICA 150 MG Cap Take 1 Cap by mouth 3 times a day.       No current [facility]-ordered facility-administered medications on file.        Allergies: Patient has no known allergies.    Social History     Tobacco Use   • Smoking status: Never Smoker   • Smokeless tobacco: Never Used   Substance Use Topics   • Alcohol use: Yes     Comment: 3 - 4 dirnks per month.    • Drug use: No         Family History   Problem Relation Age of Onset   • Hypertension Mother    • Heart Disease  Father    • Hypertension Father    • Colon Cancer Father    • Other Sister         Intellectual Disability    • No Known Problems Daughter    • No Known Problems Daughter          Physical Exam:  /80 (BP Location: Left arm, Patient Position: Sitting)   Wt 78 kg (172 lb)   LMP  (LMP Unknown)   BMI 28.19 kg/m²   gen: AAO, NAD, affect appropriate  CV: RRR; no LE edema  resp: ctab  Skin: warm/dry, no lesions    19: EMB  FINAL DIAGNOSIS:    A. Endometrial biopsy:         Strips of benign endometrial glandular epithelium without stroma          compatible with atrophy.         No intact endometrial with stroma is present for evaluation.         No atypia or malignancy identified in a limited specimen.         Deeper level examined.  B. Cervix biopsy:         Small fragments of benign ectocervical squamous mucosa and          adjacent endocervical glandular mucosa demonstrating focal          squamous metaplasia and mild to moderate acute and chronic          inflammation with reactive changes.         No dysplasia or malignancy identified.    19:  FINDINGS:  Both transabdominal and transvaginal scanning were performed to optimally visualize the pelvis.    The uterus measures 2.96 cm x 4.88 cm x 3.83 cm.  The uterine myometrium is inhomogeneous.  The endometrial echo complex measures 1.07 cm. There is a small amount of fluid within the endometrial cavity.    Low resistive waveforms are confirmed within the ovaries.  The right ovary measures 1.35 cm x 1.79 cm x 2.47 cm.    The left ovary measures 1.47 cm x 1.29 cm x 1.95 cm.    No adnexal mass identified.    There is trace free fluid seen.      Impression       Thickened endometrial stripe at 11 mm.    Unremarkable appearance of the ovaries.    Trace pelvic free fluid       A/P: 73 y.o.  with postmenopausal bleeding, thickened EMS  1. PMB (postmenopausal bleeding)     2. Thickened endometrium       Plan for hysteroscopy, D&C    Discussed planned  procedure and how it is done, need for additional pathologic evaluation given that her prior EMB was with minimal tissue and she has had continued VB.  I discussed w/ pt risks of surgery including pain, bleeding, infection, uterine perforation with risk of damage to surrounding structures including vagina/rectum/bladder/bowel.  Pt confirms she is accepting of blood transfusion in case of emergency.  Reviewed risks of transfusion including transfusion reaction (fever, damage to heart/lungs/kidneys), risk of exposure to infectious disease including HIV and hepatitis.  All questions answered.  Consent to be signed on day of surgery    To have nothing to eat after midnight tonight.  Can have clear liquids until 2hrs prior to surgery (encouraged to stop 4hrs before in case OR schedule changes day of).    Discussed typical recovery, plan for same day discharge with some cramping/light bleeding to be expected.     planning to bring pt tomorrow.       Erin Gann MD  [facility], Women's Health

## 2019-10-16 ENCOUNTER — ANESTHESIA (OUTPATIENT)
Dept: SURGERY | Facility: MEDICAL CENTER | Age: 74
End: 2019-10-16
Payer: MEDICARE

## 2019-10-16 ENCOUNTER — HOSPITAL ENCOUNTER (OUTPATIENT)
Facility: MEDICAL CENTER | Age: 74
End: 2019-10-16
Attending: OBSTETRICS & GYNECOLOGY | Admitting: OBSTETRICS & GYNECOLOGY
Payer: MEDICARE

## 2019-10-16 ENCOUNTER — ANESTHESIA EVENT (OUTPATIENT)
Dept: SURGERY | Facility: MEDICAL CENTER | Age: 74
End: 2019-10-16
Payer: MEDICARE

## 2019-10-16 VITALS
TEMPERATURE: 97.8 F | BODY MASS INDEX: 27.67 KG/M2 | SYSTOLIC BLOOD PRESSURE: 167 MMHG | OXYGEN SATURATION: 92 % | DIASTOLIC BLOOD PRESSURE: 77 MMHG | RESPIRATION RATE: 24 BRPM | WEIGHT: 172.18 LBS | HEIGHT: 66 IN | HEART RATE: 83 BPM

## 2019-10-16 LAB — PATHOLOGY CONSULT NOTE: NORMAL

## 2019-10-16 PROCEDURE — 501394 HCHG SOLUTION SORBITOL 3% 3000ML BAG: Performed by: OBSTETRICS & GYNECOLOGY

## 2019-10-16 PROCEDURE — 700111 HCHG RX REV CODE 636 W/ 250 OVERRIDE (IP): Performed by: ANESTHESIOLOGY

## 2019-10-16 PROCEDURE — 160048 HCHG OR STATISTICAL LEVEL 1-5: Performed by: OBSTETRICS & GYNECOLOGY

## 2019-10-16 PROCEDURE — 160009 HCHG ANES TIME/MIN: Performed by: OBSTETRICS & GYNECOLOGY

## 2019-10-16 PROCEDURE — 160025 RECOVERY II MINUTES (STATS): Performed by: OBSTETRICS & GYNECOLOGY

## 2019-10-16 PROCEDURE — 160041 HCHG SURGERY MINUTES - EA ADDL 1 MIN LEVEL 4: Performed by: OBSTETRICS & GYNECOLOGY

## 2019-10-16 PROCEDURE — 700101 HCHG RX REV CODE 250: Performed by: ANESTHESIOLOGY

## 2019-10-16 PROCEDURE — 88305 TISSUE EXAM BY PATHOLOGIST: CPT

## 2019-10-16 PROCEDURE — A9270 NON-COVERED ITEM OR SERVICE: HCPCS | Performed by: ANESTHESIOLOGY

## 2019-10-16 PROCEDURE — 160035 HCHG PACU - 1ST 60 MINS PHASE I: Performed by: OBSTETRICS & GYNECOLOGY

## 2019-10-16 PROCEDURE — 160046 HCHG PACU - 1ST 60 MINS PHASE II: Performed by: OBSTETRICS & GYNECOLOGY

## 2019-10-16 PROCEDURE — 501838 HCHG SUTURE GENERAL: Performed by: OBSTETRICS & GYNECOLOGY

## 2019-10-16 PROCEDURE — 160029 HCHG SURGERY MINUTES - 1ST 30 MINS LEVEL 4: Performed by: OBSTETRICS & GYNECOLOGY

## 2019-10-16 PROCEDURE — 700105 HCHG RX REV CODE 258: Performed by: ANESTHESIOLOGY

## 2019-10-16 PROCEDURE — 700102 HCHG RX REV CODE 250 W/ 637 OVERRIDE(OP): Performed by: ANESTHESIOLOGY

## 2019-10-16 PROCEDURE — 58558 HYSTEROSCOPY BIOPSY: CPT | Performed by: OBSTETRICS & GYNECOLOGY

## 2019-10-16 PROCEDURE — 160002 HCHG RECOVERY MINUTES (STAT): Performed by: OBSTETRICS & GYNECOLOGY

## 2019-10-16 PROCEDURE — 502587 HCHG PACK, D&C: Performed by: OBSTETRICS & GYNECOLOGY

## 2019-10-16 PROCEDURE — 700105 HCHG RX REV CODE 258: Performed by: OBSTETRICS & GYNECOLOGY

## 2019-10-16 RX ORDER — CELECOXIB 200 MG/1
200 CAPSULE ORAL ONCE
Status: COMPLETED | OUTPATIENT
Start: 2019-10-16 | End: 2019-10-16

## 2019-10-16 RX ORDER — OXYCODONE HCL 5 MG/5 ML
10 SOLUTION, ORAL ORAL
Status: DISCONTINUED | OUTPATIENT
Start: 2019-10-16 | End: 2019-10-16 | Stop reason: HOSPADM

## 2019-10-16 RX ORDER — DEXAMETHASONE SODIUM PHOSPHATE 4 MG/ML
INJECTION, SOLUTION INTRA-ARTICULAR; INTRALESIONAL; INTRAMUSCULAR; INTRAVENOUS; SOFT TISSUE PRN
Status: DISCONTINUED | OUTPATIENT
Start: 2019-10-16 | End: 2019-10-16 | Stop reason: SURG

## 2019-10-16 RX ORDER — LIDOCAINE HYDROCHLORIDE 20 MG/ML
INJECTION, SOLUTION EPIDURAL; INFILTRATION; INTRACAUDAL; PERINEURAL PRN
Status: DISCONTINUED | OUTPATIENT
Start: 2019-10-16 | End: 2019-10-16 | Stop reason: SURG

## 2019-10-16 RX ORDER — SODIUM CHLORIDE, SODIUM LACTATE, POTASSIUM CHLORIDE, CALCIUM CHLORIDE 600; 310; 30; 20 MG/100ML; MG/100ML; MG/100ML; MG/100ML
INJECTION, SOLUTION INTRAVENOUS
Status: DISCONTINUED | OUTPATIENT
Start: 2019-10-16 | End: 2019-10-16 | Stop reason: SURG

## 2019-10-16 RX ORDER — OXYCODONE HCL 5 MG/5 ML
5 SOLUTION, ORAL ORAL
Status: DISCONTINUED | OUTPATIENT
Start: 2019-10-16 | End: 2019-10-16 | Stop reason: HOSPADM

## 2019-10-16 RX ORDER — ONDANSETRON 2 MG/ML
4 INJECTION INTRAMUSCULAR; INTRAVENOUS
Status: DISCONTINUED | OUTPATIENT
Start: 2019-10-16 | End: 2019-10-16 | Stop reason: HOSPADM

## 2019-10-16 RX ORDER — HYDROMORPHONE HYDROCHLORIDE 1 MG/ML
0.2 INJECTION, SOLUTION INTRAMUSCULAR; INTRAVENOUS; SUBCUTANEOUS
Status: DISCONTINUED | OUTPATIENT
Start: 2019-10-16 | End: 2019-10-16 | Stop reason: HOSPADM

## 2019-10-16 RX ORDER — HYDROMORPHONE HYDROCHLORIDE 1 MG/ML
0.1 INJECTION, SOLUTION INTRAMUSCULAR; INTRAVENOUS; SUBCUTANEOUS
Status: DISCONTINUED | OUTPATIENT
Start: 2019-10-16 | End: 2019-10-16 | Stop reason: HOSPADM

## 2019-10-16 RX ORDER — SUCCINYLCHOLINE CHLORIDE 20 MG/ML
INJECTION INTRAMUSCULAR; INTRAVENOUS PRN
Status: DISCONTINUED | OUTPATIENT
Start: 2019-10-16 | End: 2019-10-16 | Stop reason: SURG

## 2019-10-16 RX ORDER — HYDROMORPHONE HYDROCHLORIDE 1 MG/ML
0.4 INJECTION, SOLUTION INTRAMUSCULAR; INTRAVENOUS; SUBCUTANEOUS
Status: DISCONTINUED | OUTPATIENT
Start: 2019-10-16 | End: 2019-10-16 | Stop reason: HOSPADM

## 2019-10-16 RX ORDER — HALOPERIDOL 5 MG/ML
1 INJECTION INTRAMUSCULAR
Status: DISCONTINUED | OUTPATIENT
Start: 2019-10-16 | End: 2019-10-16 | Stop reason: HOSPADM

## 2019-10-16 RX ORDER — ONDANSETRON 2 MG/ML
INJECTION INTRAMUSCULAR; INTRAVENOUS PRN
Status: DISCONTINUED | OUTPATIENT
Start: 2019-10-16 | End: 2019-10-16 | Stop reason: SURG

## 2019-10-16 RX ORDER — SODIUM CHLORIDE, SODIUM LACTATE, POTASSIUM CHLORIDE, CALCIUM CHLORIDE 600; 310; 30; 20 MG/100ML; MG/100ML; MG/100ML; MG/100ML
INJECTION, SOLUTION INTRAVENOUS CONTINUOUS
Status: DISCONTINUED | OUTPATIENT
Start: 2019-10-16 | End: 2019-10-16 | Stop reason: HOSPADM

## 2019-10-16 RX ORDER — ACETAMINOPHEN 500 MG
1000 TABLET ORAL ONCE
Status: COMPLETED | OUTPATIENT
Start: 2019-10-16 | End: 2019-10-16

## 2019-10-16 RX ADMIN — ACETAMINOPHEN 1000 MG: 500 TABLET ORAL at 13:45

## 2019-10-16 RX ADMIN — FENTANYL CITRATE 100 MCG: 50 INJECTION, SOLUTION INTRAMUSCULAR; INTRAVENOUS at 14:30

## 2019-10-16 RX ADMIN — SODIUM CHLORIDE, POTASSIUM CHLORIDE, SODIUM LACTATE AND CALCIUM CHLORIDE 1000 ML: 600; 310; 30; 20 INJECTION, SOLUTION INTRAVENOUS at 13:44

## 2019-10-16 RX ADMIN — SODIUM CHLORIDE, POTASSIUM CHLORIDE, SODIUM LACTATE AND CALCIUM CHLORIDE: 600; 310; 30; 20 INJECTION, SOLUTION INTRAVENOUS at 14:26

## 2019-10-16 RX ADMIN — PROPOFOL 120 MG: 10 INJECTION, EMULSION INTRAVENOUS at 14:30

## 2019-10-16 RX ADMIN — SUCCINYLCHOLINE CHLORIDE 100 MG: 20 INJECTION, SOLUTION INTRAMUSCULAR; INTRAVENOUS at 14:30

## 2019-10-16 RX ADMIN — ONDANSETRON 4 MG: 2 INJECTION INTRAMUSCULAR; INTRAVENOUS at 14:34

## 2019-10-16 RX ADMIN — DEXAMETHASONE SODIUM PHOSPHATE 8 MG: 4 INJECTION, SOLUTION INTRA-ARTICULAR; INTRALESIONAL; INTRAMUSCULAR; INTRAVENOUS; SOFT TISSUE at 14:33

## 2019-10-16 RX ADMIN — LIDOCAINE HYDROCHLORIDE 100 MG: 20 INJECTION, SOLUTION EPIDURAL; INFILTRATION; INTRACAUDAL at 14:30

## 2019-10-16 RX ADMIN — EPHEDRINE SULFATE 10 MG: 50 INJECTION, SOLUTION INTRAVENOUS at 14:43

## 2019-10-16 RX ADMIN — CELECOXIB 200 MG: 200 CAPSULE ORAL at 13:46

## 2019-10-16 ASSESSMENT — PAIN SCALES - GENERAL: PAIN_LEVEL: 0

## 2019-10-16 NOTE — ANESTHESIA TIME REPORT
Anesthesia Start and Stop Event Times     Date Time Event    10/16/2019 1414 Ready for Procedure     1426 Anesthesia Start     1509 Anesthesia Stop        Responsible Staff  10/16/19    Name Role Begin End    Fernando Hodges M.D. Anesth 1426 1509        Preop Diagnosis (Free Text):  Pre-op Diagnosis     POSTMENOPAUSAL BLEEDING        Preop Diagnosis (Codes):    Post op Diagnosis  Post-menopausal bleeding      Premium Reason  A. 3PM - 7AM    Comments:

## 2019-10-16 NOTE — OP REPORT
Operative Report  10/16/19    PreOp Diagnosis:   1. Postmenopausal bleeding  2. Pelvic organ prolapse    PostOp Diagnosis:   1. Postmenopausal bleeding  2. Pelvic organ prolapse    Procedure(s):  HYSTEROSCOPY, DIAGNOSTIC - Wound Class: Clean Contaminated  DILATION AND CURETTAGE - Wound Class: Clean Contaminated    Surgeon(s):  Erin Gann M.D.    Anesthesiologist/Type of Anesthesia:  Anesthesiologist: Fernando Hodges M.D./General    Surgical Staff:  Circulator: Imelda Rodriguez R.N.; Ciarrajayesh Teresa R.N.  Scrub Person: Shayy Ortiz    Specimens removed if any:  Endometrial curettings     Estimated Blood Loss: <10cc  IVF: 600cc  UOP: 30cc  Fluid deficit: <80cc    Findings: uterus small, sounded to 7cm, significant vaginal atrophy, endometrium atrophic in appearance    Complications: none    Procedure in Detail:  Pt was taken to the operating room where general anesthesia was initiated after SCDs were placed.  She was prepped and draped in the normal, sterile fashion using Tello stirrups.  The bladder was drained with straight catheter producing approximately 30cc of clear urine.  A ring with support pessary was removed from the vagina.  A speculum was inserted into the vagina and the anterior lip of the cervix grasped with single tooth tenaculum.  The uterus sounded to 7cm.  The cervix was easily dilated to approximately 6mm.  The hysteroscope was prepped and advanced into the uterine cavity with the above findings noted.  The hysteroscope was removed, a sharp curette advanced into the endometrial cavity and the endometrium curetted until a gritty texture was appreciated circumferentially.  Minimal tissue was collected, likely secondary to atrophy, labeled, and sent to pathology.  The vagina and cervix were significantly friable and atrophic with small bleeding noted from manipulation.  All instruments were removed from the vagina with hemostasis noted.  The patien'ts pessary was replaced into the  vagina.  The pt was awakened from GETA and taken to the operating room in stable condition.  All counts were correct.  There were no complications.     Erin Gann MD  [facility], Women's Health

## 2019-10-16 NOTE — OR SURGEON
Immediate Post OP Note    PreOp Diagnosis:   1. Postmenopausal bleeding  2. Pelvic organ prolapse    PostOp Diagnosis:   1. Postmenopausal bleeding  2. Pelvic organ prolapse    Procedure(s):  HYSTEROSCOPY, DIAGNOSTIC - Wound Class: Clean Contaminated  DILATION AND CURETTAGE - Wound Class: Clean Contaminated    Surgeon(s):  Erin Gann M.D.    Anesthesiologist/Type of Anesthesia:  Anesthesiologist: Fernando Hodges M.D./General    Surgical Staff:  Circulator: Imelda Rodriguez R.N.; Ciarrajayesh Teresa R.N.  Scrub Person: Shayy Ortiz    Specimens removed if any:  Endometrial curettings     Estimated Blood Loss: <10cc  IVF: 600cc  UOP: 30cc  Fluid deficit: <80cc    Findings: uterus small, sounded to 7cm, significant vaginal atrophy, endometrium atrophic in appearance    Complications: none        10/16/2019 3:13 PM Erin Gann M.D.

## 2019-10-16 NOTE — OR NURSING
1507  RECEIVED PATIENT FROM OR.  REPORT FROM DR. DENNEY.  ORAL AIRWAY IN PLACE.  RESPIRATIONS ARE EVEN AND UNLABORED.  DK PAD IN PLACE.    1513  ORAL AIRWAY DC'D WITHOUT DIFFICULTY.     1525   CHRISTIANE UPDATED IN WAITING ROOM.    1542   CHRISTIANE TO BEDSIDE.       1604  PHASE 2.  EATING ICE CREAM.     1615  UP GETTING DRESSED.      1625  DISCHARGED.  DISCHARGE INSTRUCTIONS GIVEN TO PATIENT AND HER .  A VERBAL UNDERSTANDING OF ALL INSTRUCTIONS WAS STATED.  PATIENT TAKING PO AND AMBULATING WITHOUT DIFFICULTY.  PATIENT STATES SHE IS READY TO GO HOME.

## 2019-10-16 NOTE — ANESTHESIA PREPROCEDURE EVALUATION
Relevant Problems   CARDIAC   (+) Essential hypertension      Other   (+) Cystocele with uterine prolapse     lyrica  Percocet      Physical Exam    Airway   Mallampati: II  TM distance: >3 FB  Neck ROM: full       Cardiovascular - normal exam  Rhythm: regular  Rate: normal  (-) murmur     Dental   (+) upper dentures, lower dentures         Pulmonary - normal exam  Breath sounds clear to auscultation     Abdominal    Neurological - normal exam                 Anesthesia Plan    ASA 2       Plan - general       Airway plan will be ETT        Induction: intravenous    Postoperative Plan: Postoperative administration of opioids is intended.    Pertinent diagnostic labs and testing reviewed    Informed Consent:    Anesthetic plan and risks discussed with patient.    Use of blood products discussed with: patient whom consented to blood products.

## 2019-10-16 NOTE — DISCHARGE INSTRUCTIONS
ACTIVITY: Rest and take it easy for the first 24 hours.  A responsible adult is recommended to remain with you during that time.  It is normal to feel sleepy.  We encourage you to not do anything that requires balance, judgment or coordination.    MILD FLU-LIKE SYMPTOMS ARE NORMAL. YOU MAY EXPERIENCE GENERALIZED MUSCLE ACHES, THROAT IRRITATION, HEADACHE AND/OR SOME NAUSEA.    FOR 24 HOURS DO NOT:  Drive, operate machinery or run household appliances.  Drink beer or alcoholic beverages.   Make important decisions or sign legal documents.    SPECIAL INSTRUCTIONS: *SEE HYSTEROSCOPY INSTRUCTION SHEET**    DIET: To avoid nausea, slowly advance diet as tolerated, avoiding spicy or greasy foods for the first day.  Add more substantial food to your diet according to your physician's instructions.  Babies can be fed formula or breast milk as soon as they are hungry.  INCREASE FLUIDS AND FIBER TO AVOID CONSTIPATION.    SURGICAL DRESSING/BATHING: *MAY SHOWER TOMORROW.  NO TUB BATHS, HOT TUBS OR SWIMMING UNTIL APPROVED BY PHYSICIAN *    FOLLOW-UP APPOINTMENT:  A follow-up appointment should be arranged with your doctor in *FOLLOW UP WITH DR. NARANJO**; call to schedule.    You should CALL YOUR PHYSICIAN if you develop:  Fever greater than 101 degrees F.  Pain not relieved by medication, or persistent nausea or vomiting.  Excessive bleeding (blood soaking through dressing) or unexpected drainage from the wound.  Extreme redness or swelling around the incision site, drainage of pus or foul smelling drainage.  Inability to urinate or empty your bladder within 8 hours.  Problems with breathing or chest pain.    You should call 911 if you develop problems with breathing or chest pain.  If you are unable to contact  your doctor or surgical center, you should go to the nearest emergency room or urgent care center.  Physician's telephone #: *[phone]**    If any questions arise, call your doctor.  If your doctor is not available, please feel free to call the Surgical Center at [phone].  The Center is open Monday through Friday from 7AM to 7PM.  You can also call the HEALTH HOTLINE open 24 hours/day, 7 days/week and speak to a nurse at [phone], or toll free at [phone].    A registered nurse may call you a few days after your surgery to see how you are doing after your procedure.    MEDICATIONS: Resume taking daily medication.  Take prescribed pain medication with food.  If no medication is prescribed, you may take non-aspirin pain medication if needed.  PAIN MEDICATION CAN BE VERY CONSTIPATING.  Take a stool softener or laxative such as senokot, pericolace, or milk of magnesia if needed.    Prescription given for *NONE**.  Last pain medication given at *NONE**.    If your physician has prescribed pain medication that includes Acetaminophen (Tylenol), do not take additional Acetaminophen (Tylenol) while taking the prescribed medication.    Depression / Suicide Risk    As you are discharged from this [facility] facility, it is important to learn how to keep safe from harming yourself.    Recognize the warning signs:  · Abrupt changes in personality, positive or negative- including increase in energy   · Giving away possessions  · Change in eating patterns- significant weight changes-  positive or negative  · Change in sleeping patterns- unable to sleep or sleeping all the time   · Unwillingness or inability to communicate  · Depression  · Unusual sadness, discouragement and loneliness  · Talk of wanting to die  · Neglect of personal appearance   · Rebelliousness- reckless behavior  · Withdrawal from people/activities they love  · Confusion- inability to concentrate     If you or a loved one observes any of  these behaviors or has concerns about self-harm, here's what you can do:  · Talk about it- your feelings and reasons for harming yourself  · Remove any means that you might use to hurt yourself (examples: pills, rope, extension cords, firearm)  · Get professional help from the community (Mental Health, Substance Abuse, psychological counseling)  · Do not be alone:Call your Safe Contact- someone whom you trust who will be there for you.  · Call your local CRISIS HOTLINE 462-9883 or 742-465-6224  · Call your local Children's Mobile Crisis Response Team [place] (709) 593-6047 or www.[employer]  · Call the toll free National Suicide Prevention Hotlines   · National Suicide Prevention Lifeline 172-509-RXMM (9594)  · National Hope Line Network 800-SUICIDE (018-0635)

## 2019-10-16 NOTE — ANESTHESIA QCDR
2019 [facility] Clinical Data Registry (for Quality Improvement)     Postoperative nausea/vomiting risk protocol (Adult = 18 yrs and Pediatric 3-17 yrs)- (430 and 463)  General inhalation anesthetic (NOT TIVA) with PONV risk factors: No  Provision of anti-emetic therapy with at least 2 different classes of agents: N/A  Patient DID NOT receive anti-emetic therapy and reason is documented in Medical Record: N/A    Multimodal Pain Management- (AQI59)  Patient undergoing Elective Surgery (i.e. Outpatient, or ASC, or Prescheduled Surgery prior to Hospital Admission): Yes  Use of Multimodal Pain Management, two or more drugs and/or interventions, NOT including systemic opioids: Yes   Exception: Documented allergy to multiple classes of analgesics:  N/A    PACU assessment of acute postoperative pain prior to Anesthesia Care End- Applies to Patients Age = 18- (ABG7)  Initial PACU pain score is which of the following: < 7/10  Patient unable to report pain score: N/A    Post-anesthetic transfer of care checklist/protocol to PACU/ICU- (426 and 427)  Upon conclusion of case, patient transferred to which of the following locations: PACU/Non-ICU  Use of transfer checklist/protocol: Yes  Exclusion: Service Performed in Patient Hospital Room (and thus did not require transfer): N/A    PACU Reintubation- (AQI31)  General anesthesia requiring endotracheal intubation (ETT) along with subsequent extubation in OR or PACU: Yes  Required reintubation in the PACU: No   Extubation was a planned trial documented in the medical record prior to removal of the original airway device:  N/A    Unplanned admission to ICU related to anesthesia service up through end of PACU care- (MD51)  Unplanned admission to ICU (not initially anticipated at anesthesia start time): No

## 2019-10-16 NOTE — ANESTHESIA PROCEDURE NOTES
Airway  Date/Time: 10/16/2019 2:31 PM  Performed by: Fernando Hodges M.D.  Authorized by: Fernando Hodges M.D.     Location:  OR  Urgency:  Elective  Indications for Airway Management:  Anesthesia  Spontaneous Ventilation: absent    Sedation Level:  Deep  Preoxygenated: Yes    Patient Position:  Sniffing  Final Airway Type:  Endotracheal airway  Final Endotracheal Airway:  ETT  Cuffed: Yes    Technique Used for Successful ETT Placement:  Direct laryngoscopy  Insertion Site:  Oral  Blade Type:  Damon  Laryngoscope Blade/Videolaryngoscope Blade Size:  3  ETT Size (mm):  7.0  Measured from:  Teeth  ETT to Teeth (cm):  20  Placement Verified by: auscultation and capnometry    Cormack-Lehane Classification:  Grade I - full view of glottis  Number of Attempts at Approach:  1

## 2019-10-17 NOTE — ANESTHESIA POSTPROCEDURE EVALUATION
Patient: Rosenda Cowan    Procedure Summary     Date:  10/16/19 Room / Location:  [facility] ROOM 26 / SURGERY SAME DAY [facility]    Anesthesia Start:  1426 Anesthesia Stop:  1509    Procedures:       HYSTEROSCOPY, DIAGNOSTIC (N/A Uterus)      DILATION AND CURETTAGE (N/A Uterus) Diagnosis:  (POSTMENOPAUSAL BLEEDING)    Surgeon:  Erin Gann M.D. Responsible Provider:  Fernando Hodges M.D.    Anesthesia Type:  general ASA Status:  2          Final Anesthesia Type: general  Last vitals  BP   Blood Pressure : (!) 167/77    Temp   36.6 °C (97.8 °F)    Pulse   Pulse: 83   Resp   (!) 24    SpO2   92 %      Anesthesia Post Evaluation    Patient location during evaluation: PACU  Patient participation: complete - patient participated  Level of consciousness: awake and alert  Pain score: 0    Airway patency: patent  Anesthetic complications: no  Cardiovascular status: hemodynamically stable  Respiratory status: acceptable  Hydration status: euvolemic    PONV: none           Nurse Pain Score: 0 (NPRS)

## 2019-10-29 ENCOUNTER — GYNECOLOGY VISIT (OUTPATIENT)
Dept: OBGYN | Facility: MEDICAL CENTER | Age: 74
End: 2019-10-29
Payer: MEDICARE

## 2019-10-29 VITALS — DIASTOLIC BLOOD PRESSURE: 76 MMHG | WEIGHT: 176 LBS | SYSTOLIC BLOOD PRESSURE: 110 MMHG | BODY MASS INDEX: 28.41 KG/M2

## 2019-10-29 DIAGNOSIS — N95.2 VAGINAL ATROPHY: ICD-10-CM

## 2019-10-29 PROCEDURE — 99213 OFFICE O/P EST LOW 20 MIN: CPT | Performed by: OBSTETRICS & GYNECOLOGY

## 2019-10-29 NOTE — PROGRESS NOTES
GYN F/U    Cc: f/u PMB    HPI: 73 y.o.  here for f/u of PMB.  Pt with normal EMB but no stroma present, EMS 1.07cm on US.  Had continued light spotting so taken to OR for h-scope/D&C and is here for f/u of ths.    Had light spotting a few days after procedure, nothing since.  Doing well today and was without concerns.  Has a pessary for pelvic organ prolapse which we clean q3 months for pessary maintenance.    ROS:  Gen: denies fevers, general concerns  Abd: denies abdominal pain  Gu: denies vaginal bleeding, discharge, pain    Past Medical History:   Diagnosis Date   • Hypertension        /76 (BP Location: Left arm, Patient Position: Sitting)   Wt 79.8 kg (176 lb)   LMP  (LMP Unknown)   Breastfeeding? No   BMI 28.41 kg/m²   Gen; AAO, NAD  Gu:  deferred      FINAL DIAGNOSIS:    A. Endometrial curettings:         Predominantly clotted blood admixed with scant fragments of          benign endometrium demonstrating tubal metaplasia.         Few detached strips of benign endocervical epithelium also          present.         No hyperplasia, atypia or malignancy identified in this limited          biopsy specimen.      A/P: 73 y.o.  with PMB, vaginal atrophy  - significant atrophy of vagina on exam curing procedure (spontaneous bleeding during manipulation during procedure). Recommend vaginal estrogen for this.  Hysteroscopy showed only atrophic endometrium and normal pathology as above.  Discussed vaginal estrogen preparations including ring, tablet, cream.  Patient prefers to try ring.  Will bring with her to next appointment after picking up from the pharmacy and discussed that we can replace the ring at the time of her pessary cleaning every 3 months here as long as she continues to bring it from the pharmacy.      F/U: next available when ready for Estring insertion/pessary maintenance    Erin Gann MD  [facility], Women's Health

## 2019-11-20 ENCOUNTER — TELEPHONE (OUTPATIENT)
Dept: OBGYN | Facility: CLINIC | Age: 74
End: 2019-11-20

## 2019-11-20 RX ORDER — ESTRADIOL 0.1 MG/G
0.5 CREAM VAGINAL
Qty: 42.5 G | Refills: 3 | Status: SHIPPED | OUTPATIENT
Start: 2019-11-20 | End: 2020-04-14 | Stop reason: SDUPTHER

## 2019-11-21 ENCOUNTER — TELEPHONE (OUTPATIENT)
Dept: OBGYN | Facility: CLINIC | Age: 74
End: 2019-11-21

## 2019-11-21 NOTE — TELEPHONE ENCOUNTER
----- Message from Erin Gann M.D. sent at 11/20/2019  5:15 AM PST -----  Regarding: RE: Rx  I'm happy to try a prior authorization although please let the pt know that unfortunately medicare doesn't typically cover vaginal estrogen of any kind.  Let me know what paperwork we need to try, also please offer to the pt to change to vaginal estrogen cream or tablet if she prefers but there's no easy way for me to know what would be cheaper as none would be covered.    11/20/19 Spoke with pt and notified her of above, pt would like to try alternative first. Adv pt will let Dr. Gann know and will inform pt once Rx has been sent. Pt understood and agreed to plan. Message sent to Dr. Gann    ----- Message -----  From: Mariana Belle, Med Ass't  Sent: 11/19/2019   3:16 PM PST  To: Erin Gann M.D.  Subject: Rx                                               Hi Dr. Gann,    We received a fax from the pharmacy stating pt's Rx for Estring is not covered.  They either need an alternative or submit PA.   What would you like to do? Please advise    Mariana

## 2019-11-22 NOTE — TELEPHONE ENCOUNTER
----- Message from Erin Gann M.D. sent at 11/20/2019 11:45 PM PST -----  Regarding: RE: Rx  I sent a rx for estrace cream which should be less expensive, can you let pt know?    If still too expensive I'm happy to try to submit a prior auth for her.  Thanks    11/21/19 5797 Spoke with pt and notified her of above. Adv pt to call pharmacy and find out what her copay is and if it is still too expensive to let us know and we'll be more than happy to submit PA. Pt understood and will let us know. Pt had no other questions     ----- Message -----  From: Mariana Belle, Med Ass't  Sent: 11/20/2019   4:24 PM PST  To: Erin Gann M.D.  Subject: FW: Rx                                           Hi Doctor,    I spoke with pt and she would like to try an alternative.    Mariana  ----- Message -----  From: Erin Gann M.D.  Sent: 11/20/2019   5:15 AM PST  To: Mariana Belle, Med Ass't  Subject: RE: Rx                                           I'm happy to try a prior authorization although please let the pt know that unfortunately medicare doesn't typically cover vaginal estrogen of any kind.  Let me know what paperwork we need to try, also please offer to the pt to change to vaginal estrogen cream or tablet if she prefers but there's no easy way for me to know what would be cheaper as none would be covered.    ----- Message -----  From: Mariana Belle, Med Ass't  Sent: 11/19/2019   3:16 PM PST  To: Erin Gann M.D.  Subject: Rx                                               Hi Dr. Gann,    We received a fax from the pharmacy stating pt's Rx for Estring is not covered.  They either need an alternative or submit PA.   What would you like to do? Please advise    Mariana

## 2020-01-07 ENCOUNTER — OFFICE VISIT (OUTPATIENT)
Dept: MEDICAL GROUP | Facility: PHYSICIAN GROUP | Age: 75
End: 2020-01-07
Payer: MEDICARE

## 2020-01-07 ENCOUNTER — HOSPITAL ENCOUNTER (OUTPATIENT)
Dept: LAB | Facility: MEDICAL CENTER | Age: 75
End: 2020-01-07
Attending: PHYSICIAN ASSISTANT
Payer: MEDICARE

## 2020-01-07 VITALS
RESPIRATION RATE: 20 BRPM | WEIGHT: 172.6 LBS | TEMPERATURE: 97 F | BODY MASS INDEX: 27.74 KG/M2 | HEIGHT: 66 IN | DIASTOLIC BLOOD PRESSURE: 82 MMHG | OXYGEN SATURATION: 94 % | SYSTOLIC BLOOD PRESSURE: 148 MMHG | HEART RATE: 72 BPM

## 2020-01-07 DIAGNOSIS — I10 ESSENTIAL HYPERTENSION: ICD-10-CM

## 2020-01-07 DIAGNOSIS — R79.89 ABNORMAL CBC: ICD-10-CM

## 2020-01-07 DIAGNOSIS — Z23 NEED FOR VACCINATION: ICD-10-CM

## 2020-01-07 DIAGNOSIS — E78.00 ELEVATED LDL CHOLESTEROL LEVEL: ICD-10-CM

## 2020-01-07 LAB
BASOPHILS # BLD AUTO: 0.7 % (ref 0–1.8)
BASOPHILS # BLD: 0.03 K/UL (ref 0–0.12)
EOSINOPHIL # BLD AUTO: 0.21 K/UL (ref 0–0.51)
EOSINOPHIL NFR BLD: 4.8 % (ref 0–6.9)
ERYTHROCYTE [DISTWIDTH] IN BLOOD BY AUTOMATED COUNT: 44.8 FL (ref 35.9–50)
HCT VFR BLD AUTO: 42 % (ref 37–47)
HGB BLD-MCNC: 14.1 G/DL (ref 12–16)
IMM GRANULOCYTES # BLD AUTO: 0.01 K/UL (ref 0–0.11)
IMM GRANULOCYTES NFR BLD AUTO: 0.2 % (ref 0–0.9)
LYMPHOCYTES # BLD AUTO: 1.68 K/UL (ref 1–4.8)
LYMPHOCYTES NFR BLD: 38.3 % (ref 22–41)
MCH RBC QN AUTO: 31.4 PG (ref 27–33)
MCHC RBC AUTO-ENTMCNC: 33.6 G/DL (ref 33.6–35)
MCV RBC AUTO: 93.5 FL (ref 81.4–97.8)
MONOCYTES # BLD AUTO: 0.46 K/UL (ref 0–0.85)
MONOCYTES NFR BLD AUTO: 10.5 % (ref 0–13.4)
NEUTROPHILS # BLD AUTO: 2 K/UL (ref 2–7.15)
NEUTROPHILS NFR BLD: 45.5 % (ref 44–72)
NRBC # BLD AUTO: 0 K/UL
NRBC BLD-RTO: 0 /100 WBC
PLATELET # BLD AUTO: 231 K/UL (ref 164–446)
PMV BLD AUTO: 10.7 FL (ref 9–12.9)
RBC # BLD AUTO: 4.49 M/UL (ref 4.2–5.4)
WBC # BLD AUTO: 4.4 K/UL (ref 4.8–10.8)

## 2020-01-07 PROCEDURE — 85025 COMPLETE CBC W/AUTO DIFF WBC: CPT

## 2020-01-07 PROCEDURE — 90662 IIV NO PRSV INCREASED AG IM: CPT | Performed by: PHYSICIAN ASSISTANT

## 2020-01-07 PROCEDURE — 99214 OFFICE O/P EST MOD 30 MIN: CPT | Mod: 25 | Performed by: PHYSICIAN ASSISTANT

## 2020-01-07 PROCEDURE — 36415 COLL VENOUS BLD VENIPUNCTURE: CPT

## 2020-01-07 PROCEDURE — G0008 ADMIN INFLUENZA VIRUS VAC: HCPCS | Performed by: PHYSICIAN ASSISTANT

## 2020-01-07 RX ORDER — LISINOPRIL 5 MG/1
5 TABLET ORAL DAILY
Qty: 90 TAB | Refills: 3 | Status: SHIPPED | OUTPATIENT
Start: 2020-01-07 | End: 2020-07-29

## 2020-01-07 ASSESSMENT — PATIENT HEALTH QUESTIONNAIRE - PHQ9: CLINICAL INTERPRETATION OF PHQ2 SCORE: 0

## 2020-01-07 ASSESSMENT — PAIN SCALES - GENERAL: PAINLEVEL: 5=MODERATE PAIN

## 2020-01-07 NOTE — PROGRESS NOTES
Chief Complaint   Patient presents with   • Hypertension     follow up        HISTORY OF PRESENT ILLNESS: Rosenda Cowan is an established 74 y.o. female here to discuss the evaluation and management of:    Patient is a pleasant 74-year-old female here today to follow-up on hypertension.  Patient's blood pressure was taken on 2 separate occasions during today's appointment.  First reading 144/82 mmHg and second reading 148/82 mmHg.  She tells me that she monitors her blood pressure at home and at home systolic ranges in the mid 120 range and diastolic in the low 80 range.  [facility]  [facility] blood pressure monitor is her 's.  [facility] she monitors blood pressure a few times a week.  She denies chest pain, shortness breath, heart palpitations, dizziness, syncope, severe headache, vision changes, peripheral edema.    Patient discontinued lisinopril 5 mg tab once daily several months ago.  It appears blood pressure was better controlled when taking lisinopril.  Patient has agreed to restart lisinopril during today's appointment.  She denies experiencing a dry cough when on medication.  She tells me that her diet is fairly healthy but admits that she could be more active.  States she is active around the house but does not have a regular exercise routine.    CBC from 10/7/2019 indicated a decreased white blood cell count.  I do not have previous lab work results to review for comparison.  Patient repeat lab work for further evaluation.  Patient denies fever, chills, nausea, vomiting, intentional weight loss, night sweats, myalgias, lymphadenopathy.    Reviewed lipid profile lab work results from 10/7/2019 with patient.  Positive for .  Reviewed patient's 10-year ASCVD and CVD risk score.  Patient is medium risk.  Patient does not want to start a statin medication.  States she would like to continue working on diet and exercise.    Patient Active Problem  List    Diagnosis Date Noted   • Vitamin D deficiency 10/01/2019   • Post herpetic neuralgia 2019   • Essential hypertension 2019   • Rectocele 2019   • Cystocele with uterine prolapse 2017       Allergies:Patient has no known allergies.    Current Outpatient Medications   Medication Sig Dispense Refill   • Zoster Vac Recomb Adjuvanted (SHINGRIX) 50 MCG/0.5ML Recon Susp 0.5 mL by Intramuscular route Once for 1 dose. 0.5 mL 1   • lisinopril (PRINIVIL) 5 MG Tab Take 1 Tab by mouth every day. 90 Tab 3   • estradiol (ESTRACE) 0.1 MG/GM vaginal cream Insert 0.5 g in vagina Every  and Wednesday. 42.5 g 3   • LYRICA 150 MG Cap Take 1 Cap by mouth 3 times a day.     • oxycodone-acetaminophen (PERCOCET) 5-325 MG Tab Take 1-2 Tabs by mouth every four hours as needed.       No current facility-administered medications for this visit.        Social History     Tobacco Use   • Smoking status: Never Smoker   • Smokeless tobacco: Never Used   Substance Use Topics   • Alcohol use: Yes     Comment: 3 - 4 dirnks per month.    • Drug use: No       Family Status   Relation Name Status   • Mo     • Fa     • Sis  Alive   • Sis  Alive   • Sis  Alive   • Bridger  Alive   • Bridger  Alive     Family History   Problem Relation Age of Onset   • Hypertension Mother    • Heart Disease Father    • Hypertension Father    • Colon Cancer Father    • Other Sister         Intellectual Disability    • No Known Problems Daughter    • No Known Problems Daughter        ROS:  Review of Systems   Constitutional: Negative for fever, chills, weight loss and malaise/fatigue.   HENT: Negative for ear pain, nosebleeds, congestion, sore throat and neck pain.    Eyes: Negative for blurred vision.   Respiratory: Negative for cough, sputum production, shortness of breath and wheezing.    Cardiovascular: Negative for chest pain, palpitations, orthopnea and leg swelling.   Gastrointestinal: Negative for heartburn, nausea,  "vomiting and abdominal pain.   Genitourinary: Negative for dysuria, urgency and frequency.   Musculoskeletal: Negative for myalgias, back pain and joint pain.   Skin: Negative for rash and itching.   Neurological: Negative for dizziness, tingling, tremors, sensory change, focal weakness and headaches.   Endo/Heme/Allergies: Does not bruise/bleed easily.   Psychiatric/Behavioral: Negative for depression, suicidal ideas and memory loss.  The patient is not nervous/anxious and does not have insomnia.    All other systems reviewed and are negative except as in HPI.    Exam: /82 (BP Location: Right arm, Patient Position: Sitting)   Pulse 72   Temp 36.1 °C (97 °F) (Temporal)   Resp 20   Ht 1.664 m (5' 5.5\")   Wt 78.3 kg (172 lb 9.6 oz)   SpO2 94%  Body mass index is 28.29 kg/m².  General: Normal appearing. No distress.  HEENT: Normocephalic. Eyes conjunctiva clear lids without ptosis, ears normal shape and contour.  Neck: Supple without JVD. Thyroid is not enlarged.  Pulmonary: Clear to ausculation.  Normal effort. No rales, ronchi, or wheezing.  Cardiovascular: Regular rate and rhythm without murmur.   Abdomen: Soft, nontender, nondistended.   Neurologic: Grossly nonfocal.  Cranial nerves are normal.   Lymph: No cervical or supraclavicular  lymph nodes are palpable  Skin: Warm and dry.  No rashes or suspicious skin lesions.  Musculoskeletal: Normal gait. No extremity cyanosis, clubbing, or edema.  Psych: Normal mood and affect. Alert and oriented x3. Judgment and insight is normal.    Medical decision-making and discussion:    1. Essential hypertension  Patient's blood pressure was taken on 2 separate occasions during today's appointment.  First reading 144/82 mmHg and second reading 148/82 mmHg.   Patient has agreed to restart lisinopril 5 mg tab once daily.  Medication has been sent to patient's pharmacy.  Advised patient to monitor blood pressure at home.  Discussed proper ways to monitor blood pressure " with patient.  Patient will follow up in July 2020 for Medicare annual wellness visit.  At that time will reevaluate patient's blood pressure.  Patient agreed to plan.  Continue work on diet and exercise.  Continue work on hydration.    Discussed decreasing salt intake. Continue to monitor.    Discussed ED precautions.    - lisinopril (PRINIVIL) 5 MG Tab; Take 1 Tab by mouth every day.  Dispense: 90 Tab; Refill: 3    2. Abnormal CBC  CBC from 10/7/2019 indicated a decreased white blood cell count.  I do not have previous lab work results to review for comparison.  Patient repeat lab work for further evaluation.  Patient be contacted with results.  Continue to monitor.  - CBC WITH DIFFERENTIAL; Future    3. Need for vaccination  A flu vaccination was administered to patient without complication. A VIS form was provided to patient.   Shingrix prescription was provided to patient.  Advised patient take prescription to her pharmacist.  - INFLUENZA VACCINE, HIGH DOSE (65+ ONLY)  - Zoster Vac Recomb Adjuvanted (SHINGRIX) 50 MCG/0.5ML Recon Susp; 0.5 mL by Intramuscular route Once for 1 dose.  Dispense: 0.5 mL; Refill: 1      4. Elevated LDL cholesterol level  Reviewed lipid profile lab work results from 10/7/2019 with patient.  Positive for .  Reviewed patient's 10-year ASCVD and CVD risk score.  Patient is medium risk.       Please note that this dictation was created using voice recognition software. I have made every reasonable attempt to correct obvious errors, but I expect that there are errors of grammar and possibly content that I did not discover before finalizing the note.    Assessment/Plan:  1. Essential hypertension  lisinopril (PRINIVIL) 5 MG Tab   2. Abnormal CBC  CBC WITH DIFFERENTIAL   3. Need for vaccination  INFLUENZA VACCINE, HIGH DOSE (65+ ONLY)    Zoster Vac Recomb Adjuvanted (SHINGRIX) 50 MCG/0.5ML Recon Susp    DISCONTINUED: Zoster Vac Recomb Adjuvanted (SHINGRIX) 50 MCG/0.5ML Recon Susp        Return in about 6 months (around 7/7/2020).

## 2020-01-18 ENCOUNTER — OFFICE VISIT (OUTPATIENT)
Dept: URGENT CARE | Facility: CLINIC | Age: 75
End: 2020-01-18
Payer: MEDICARE

## 2020-01-18 VITALS
TEMPERATURE: 97.6 F | SYSTOLIC BLOOD PRESSURE: 122 MMHG | BODY MASS INDEX: 27.16 KG/M2 | OXYGEN SATURATION: 96 % | HEART RATE: 81 BPM | HEIGHT: 66 IN | RESPIRATION RATE: 16 BRPM | WEIGHT: 169 LBS | DIASTOLIC BLOOD PRESSURE: 80 MMHG

## 2020-01-18 DIAGNOSIS — J40 BRONCHITIS: ICD-10-CM

## 2020-01-18 PROCEDURE — 99214 OFFICE O/P EST MOD 30 MIN: CPT | Performed by: PHYSICIAN ASSISTANT

## 2020-01-18 RX ORDER — BENZONATATE 100 MG/1
100 CAPSULE ORAL 3 TIMES DAILY PRN
Qty: 30 CAP | Refills: 0 | Status: SHIPPED | OUTPATIENT
Start: 2020-01-18 | End: 2020-07-29

## 2020-01-18 RX ORDER — PREDNISONE 20 MG/1
40 TABLET ORAL DAILY
Qty: 10 TAB | Refills: 0 | Status: SHIPPED | OUTPATIENT
Start: 2020-01-18 | End: 2020-01-23

## 2020-01-18 ASSESSMENT — ENCOUNTER SYMPTOMS
WHEEZING: 1
COUGH: 1
CHILLS: 0
FEVER: 0
SPUTUM PRODUCTION: 1
SHORTNESS OF BREATH: 0

## 2020-01-18 NOTE — PROGRESS NOTES
Subjective:      Rosenda Cowan is a 74 y.o. female who presents with Cough (x 4 days, productive cough, wheezing)            HPI  74-year-old female presents to urgent care with new problem of productive cough and associated wheezing worsening since onset 4 days ago.  Positive sick contact,  has similar symptoms.   Patient has tried OTC mucinex and cold/cough medications with some relief.   Denies other associated aggravating or alleviating factors.    Review of Systems   Constitutional: Positive for malaise/fatigue. Negative for chills and fever.   HENT: Negative for congestion and sore throat.    Eyes: Negative for blurred vision and discharge.   Respiratory: Positive for cough, sputum production and wheezing. Negative for shortness of breath.    Cardiovascular: Negative for chest pain.   Gastrointestinal: Negative for abdominal pain, constipation, nausea and vomiting.   Genitourinary: Negative for dysuria.   Musculoskeletal: Negative for back pain and neck pain.   Skin: Negative for rash.   Neurological: Negative for dizziness and headaches.       Past Medical History:   Diagnosis Date   • Hypertension      Current Outpatient Medications on File Prior to Visit   Medication Sig Dispense Refill   • lisinopril (PRINIVIL) 5 MG Tab Take 1 Tab by mouth every day. 90 Tab 3   • estradiol (ESTRACE) 0.1 MG/GM vaginal cream Insert 0.5 g in vagina Every Sunday and Wednesday. 42.5 g 3   • LYRICA 150 MG Cap Take 1 Cap by mouth 3 times a day.     • oxycodone-acetaminophen (PERCOCET) 5-325 MG Tab Take 1-2 Tabs by mouth every four hours as needed.       No current facility-administered medications on file prior to visit.      No Known Allergies  Social History     Tobacco Use   • Smoking status: Never Smoker   • Smokeless tobacco: Never Used   Substance Use Topics   • Alcohol use: Yes     Comment: 3 - 4 dirnks per month.       Objective:     /80 (BP Location: Left arm, Patient Position: Sitting, BP Cuff Size:  "Adult)   Pulse 81   Temp 36.4 °C (97.6 °F) (Temporal)   Resp 16   Ht 1.676 m (5' 6\")   Wt 76.7 kg (169 lb)   LMP  (LMP Unknown)   SpO2 96%   BMI 27.28 kg/m²      Physical Exam  Vitals signs reviewed.   Constitutional:       General: She is not in acute distress.     Appearance: Normal appearance. She is well-developed. She is not ill-appearing.   HENT:      Head: Normocephalic and atraumatic.      Right Ear: Tympanic membrane and ear canal normal.      Left Ear: Tympanic membrane and ear canal normal.      Nose: Mucosal edema and rhinorrhea present. No congestion.      Mouth/Throat:      Mouth: Mucous membranes are moist.      Pharynx: Oropharynx is clear.   Neck:      Musculoskeletal: Normal range of motion and neck supple.   Cardiovascular:      Rate and Rhythm: Normal rate and regular rhythm.      Pulses: Normal pulses.      Heart sounds: Normal heart sounds.   Pulmonary:      Effort: Pulmonary effort is normal. No respiratory distress.      Breath sounds: Wheezing present.      Comments:  minimum wheezing throughout  Abdominal:      General: Bowel sounds are normal. There is no distension.      Palpations: Abdomen is soft.   Musculoskeletal: Normal range of motion.   Skin:     General: Skin is warm and dry.      Findings: No rash.   Neurological:      Mental Status: She is alert and oriented to person, place, and time.   Psychiatric:         Behavior: Behavior normal.         Thought Content: Thought content normal.         Judgment: Judgment normal.                 Assessment/Plan:     1. Bronchitis  benzonatate (TESSALON) 100 MG Cap    predniSONE (DELTASONE) 20 MG Tab   PT should follow up with PCP in 1-2 days for re-evaluation if symptoms have not improved.  Discussed red flags and reasons to return to UC or ED.  Pt and/or family verbalized understanding of diagnosis and follow up instructions and was offered informational handout on diagnosis.  PT discharged.       "

## 2020-01-23 ASSESSMENT — ENCOUNTER SYMPTOMS
DIZZINESS: 0
NECK PAIN: 0
BACK PAIN: 0
NAUSEA: 0
BLURRED VISION: 0
VOMITING: 0
EYE DISCHARGE: 0
HEADACHES: 0
SORE THROAT: 0
ABDOMINAL PAIN: 0
CONSTIPATION: 0

## 2020-02-11 ENCOUNTER — GYNECOLOGY VISIT (OUTPATIENT)
Dept: OBGYN | Facility: CLINIC | Age: 75
End: 2020-02-11
Payer: MEDICARE

## 2020-02-11 VITALS
HEART RATE: 70 BPM | SYSTOLIC BLOOD PRESSURE: 143 MMHG | DIASTOLIC BLOOD PRESSURE: 64 MMHG | WEIGHT: 171 LBS | BODY MASS INDEX: 27.6 KG/M2

## 2020-02-11 DIAGNOSIS — N76.0 VAGINAL INFLAMMATION FROM PESSARY, INITIAL ENCOUNTER (HCC): ICD-10-CM

## 2020-02-11 DIAGNOSIS — Z46.89 ENCOUNTER FOR PESSARY MAINTENANCE: ICD-10-CM

## 2020-02-11 DIAGNOSIS — T83.69XA VAGINAL INFLAMMATION FROM PESSARY, INITIAL ENCOUNTER (HCC): ICD-10-CM

## 2020-02-11 DIAGNOSIS — N95.2 VAGINAL ATROPHY: ICD-10-CM

## 2020-02-11 PROCEDURE — 99213 OFFICE O/P EST LOW 20 MIN: CPT | Performed by: OBSTETRICS & GYNECOLOGY

## 2020-02-11 NOTE — PROGRESS NOTES
GYN Visit    CC: pessary maintenance    HPI: 74 y.o.  here for pessary maintenance.    Denies concerns, no real vaginal pain.  Some mild discharge, also reports some bleeding on and off.  Using vaginal estrogen 2x weekly.    ROS:  Gen: denies general concerns  : denies concerns, see HPI    /64   Pulse 70   Wt 77.6 kg (171 lb)   LMP  (LMP Unknown)   BMI 27.60 kg/m²   Gen: AAO, NAD  : NEFG, normal vagina and cervix with significant atrophy; no actual ulcers but walls friable, bleed with gentle touch.  Pessary removed, cleaned.    A/P: 74 y.o. F with pelvic organ prolapse here for pessary maintenance  - vaginal bleeding anticipated to be from vaginal atrophy and vaginal wall friability.  No full ulcers today.  Will leave out pessary x2 weeks and recommneded to use vaginal estrogen daily for those 2 weeks.  Pt to bring pessary with her to return appt and if improved exam will replace at that time.  - if continued vaginal bleeding after improvement in vaginal mucosa, consider repeat TVUS (pt s/p h-scope D&C 10/2019 without abnormality)    RTC 2wks    Erin Gann MD  [facility], Women's Health

## 2020-02-11 NOTE — NON-PROVIDER
Pt here pessary maintenance  Pt states sill have some bleeding but not alot  Good#[phone]  Pharmacy verified

## 2020-03-09 ENCOUNTER — GYNECOLOGY VISIT (OUTPATIENT)
Dept: OBGYN | Facility: CLINIC | Age: 75
End: 2020-03-09
Payer: MEDICARE

## 2020-03-09 VITALS — BODY MASS INDEX: 27.28 KG/M2 | DIASTOLIC BLOOD PRESSURE: 68 MMHG | WEIGHT: 169 LBS | SYSTOLIC BLOOD PRESSURE: 151 MMHG

## 2020-03-09 DIAGNOSIS — N81.9 FEMALE GENITAL PROLAPSE, UNSPECIFIED TYPE: ICD-10-CM

## 2020-03-09 DIAGNOSIS — T83.89XA VAGINAL IRRITATION FROM PESSARY (HCC): ICD-10-CM

## 2020-03-09 DIAGNOSIS — N89.8 VAGINAL IRRITATION FROM PESSARY (HCC): ICD-10-CM

## 2020-03-09 PROCEDURE — 99213 OFFICE O/P EST LOW 20 MIN: CPT | Performed by: OBSTETRICS & GYNECOLOGY

## 2020-03-09 ASSESSMENT — FIBROSIS 4 INDEX: FIB4 SCORE: 1.84

## 2020-03-09 NOTE — PROGRESS NOTES
GYN Visit    CC: pessary maintenance    HPI: 74 y.o.  here for pessary maintenance.    Seen  and pessary left out secondary to significantly friable vaginal walls which bled at the lightest touch.  Plan at that time to increase estrogen from 2 times weekly to daily for 2 weeks and to return for pessary replacement.  Today patient reports doing well.  Has been using vaginal estrogen daily.  Denies any bleeding since pessary was removed.    ROS:  Gen: denies general concerns  : denies concerns, see HPI    /68   Wt 76.7 kg (169 lb)   LMP  (LMP Unknown)   Breastfeeding No   BMI 27.28 kg/m²   Gen: AAO, NAD  : NEFG, normal vagina and cervix, no erosions, vaginal mucosa improved, no bleeding noted.    Pessary placed easily    A/P: 74 y.o. F with pelvic organ prolapse here for pessary maintenance  - pt doing well, vaginal mucosa improved.  Advised to continue vaginal estrogen, will decrease the 2 times weekly.  May need to increase periodically and/or have episodes of rest from pessary at times.  Discussed could consider trial of Estring as an alternate if does not do well with this, also could consider decreasing size although she has done well with the size and would be concerned that would not be big enough.    - RTC q3mos for pessary cleaning    Erin Gann MD  [facility], Women's [facility]

## 2020-03-16 ENCOUNTER — APPOINTMENT (OUTPATIENT)
Dept: RADIOLOGY | Facility: MEDICAL CENTER | Age: 75
End: 2020-03-16
Attending: PHYSICIAN ASSISTANT
Payer: MEDICARE

## 2020-04-14 DIAGNOSIS — N95.2 VAGINAL ATROPHY: ICD-10-CM

## 2020-04-14 RX ORDER — ESTRADIOL 0.1 MG/G
0.5 CREAM VAGINAL
Qty: 42.5 G | Refills: 3 | Status: SHIPPED | OUTPATIENT
Start: 2020-04-15 | End: 2020-07-09 | Stop reason: SDUPTHER

## 2020-06-03 ENCOUNTER — GYNECOLOGY VISIT (OUTPATIENT)
Dept: OBGYN | Facility: CLINIC | Age: 75
End: 2020-06-03
Payer: MEDICARE

## 2020-06-03 VITALS — WEIGHT: 168 LBS | BODY MASS INDEX: 27.12 KG/M2

## 2020-06-03 DIAGNOSIS — Z46.89 ENCOUNTER FOR PESSARY MAINTENANCE: ICD-10-CM

## 2020-06-03 DIAGNOSIS — N81.9 FEMALE GENITAL PROLAPSE, UNSPECIFIED TYPE: ICD-10-CM

## 2020-06-03 PROCEDURE — 99213 OFFICE O/P EST LOW 20 MIN: CPT | Performed by: OBSTETRICS & GYNECOLOGY

## 2020-06-03 ASSESSMENT — FIBROSIS 4 INDEX: FIB4 SCORE: 1.84

## 2020-06-03 NOTE — PROGRESS NOTES
GYN Visit    CC: pessary maintenance    HPI: 74 y.o.  here for pessary maintenance.    Pt denies vaginal bleeding, discharge and pain.  No problems with current pessary.using vaginal estrogen 3x weekly    ROS:  Gen: denies general concerns  : denies concerns, see HPI    Wt 76.2 kg (168 lb)   LMP  (LMP Unknown)   Breastfeeding No   BMI 27.12 kg/m²   Gen: AAO, NAD  : NEFG, normal vagina and cervix, no erosions     Pessary removed, cleaned and replaced easily    A/P: 74 y.o. F with pelvic organ prolapse here for pessary maintenance  - pt doing well with pessary, no signs of complications  - RTC q3-6mos for pessary cleaning  - contact office when due for refills on estrace    Erin Gann MD  [facility], Women's [facility]

## 2020-07-09 ENCOUNTER — TELEPHONE (OUTPATIENT)
Dept: OBGYN | Facility: CLINIC | Age: 75
End: 2020-07-09

## 2020-07-09 DIAGNOSIS — N95.2 VAGINAL ATROPHY: ICD-10-CM

## 2020-07-09 RX ORDER — ESTRADIOL 0.1 MG/G
0.5 CREAM VAGINAL
Qty: 42.5 G | Refills: 3 | Status: SHIPPED | OUTPATIENT
Start: 2020-07-12 | End: 2020-10-05 | Stop reason: SDUPTHER

## 2020-07-09 NOTE — TELEPHONE ENCOUNTER
Pt called stating she needed a refill for her estrace. Stated she needed a 3 month supply. Rx sent to pharmacy on file. (Confirmed). Marisol Vences sent Rx. Pt informed. Had no further questions or concerns.

## 2020-07-29 ENCOUNTER — OFFICE VISIT (OUTPATIENT)
Dept: MEDICAL GROUP | Facility: PHYSICIAN GROUP | Age: 75
End: 2020-07-29
Payer: MEDICARE

## 2020-07-29 VITALS
SYSTOLIC BLOOD PRESSURE: 144 MMHG | TEMPERATURE: 98.5 F | RESPIRATION RATE: 16 BRPM | BODY MASS INDEX: 26.84 KG/M2 | OXYGEN SATURATION: 93 % | HEART RATE: 76 BPM | HEIGHT: 66 IN | DIASTOLIC BLOOD PRESSURE: 80 MMHG | WEIGHT: 167 LBS

## 2020-07-29 DIAGNOSIS — E55.9 VITAMIN D DEFICIENCY: ICD-10-CM

## 2020-07-29 DIAGNOSIS — Z00.00 MEDICARE ANNUAL WELLNESS VISIT, INITIAL: ICD-10-CM

## 2020-07-29 DIAGNOSIS — I10 ESSENTIAL HYPERTENSION: ICD-10-CM

## 2020-07-29 DIAGNOSIS — B02.29 POST HERPETIC NEURALGIA: ICD-10-CM

## 2020-07-29 DIAGNOSIS — E78.00 ELEVATED LDL CHOLESTEROL LEVEL: ICD-10-CM

## 2020-07-29 DIAGNOSIS — N81.4 CYSTOCELE WITH UTERINE PROLAPSE: ICD-10-CM

## 2020-07-29 DIAGNOSIS — Z23 NEED FOR VACCINATION: ICD-10-CM

## 2020-07-29 DIAGNOSIS — N81.6 RECTOCELE: ICD-10-CM

## 2020-07-29 PROCEDURE — G0439 PPPS, SUBSEQ VISIT: HCPCS | Performed by: PHYSICIAN ASSISTANT

## 2020-07-29 RX ORDER — LOSARTAN POTASSIUM 25 MG/1
25 TABLET ORAL DAILY
Qty: 90 TAB | Refills: 2 | Status: SHIPPED | OUTPATIENT
Start: 2020-07-29 | End: 2020-09-10

## 2020-07-29 RX ORDER — MULTIVIT WITH CALCIUM,IRON,MIN
TABLET ORAL
COMMUNITY

## 2020-07-29 ASSESSMENT — PATIENT HEALTH QUESTIONNAIRE - PHQ9
CLINICAL INTERPRETATION OF PHQ2 SCORE: 0
SUM OF ALL RESPONSES TO PHQ QUESTIONS 1-9: 0

## 2020-07-29 ASSESSMENT — ACTIVITIES OF DAILY LIVING (ADL): BATHING_REQUIRES_ASSISTANCE: 0

## 2020-07-29 ASSESSMENT — FIBROSIS 4 INDEX: FIB4 SCORE: 1.84

## 2020-07-29 ASSESSMENT — ENCOUNTER SYMPTOMS: GENERAL WELL-BEING: GOOD

## 2020-07-29 NOTE — PROGRESS NOTES
Chief Complaint   Patient presents with   • Annual Exam         HPI:  Rosenda Cowan is a 74 y.o. here for Medicare Annual Wellness Visit     Patient Active Problem List    Diagnosis Date Noted   • Elevated LDL cholesterol level 01/07/2020   • Vitamin D deficiency 10/01/2019   • Post herpetic neuralgia 06/14/2019   • Essential hypertension 06/14/2019   • Rectocele 06/14/2019   • Cystocele with uterine prolapse 12/28/2017       Current Outpatient Medications   Medication Sig Dispense Refill   • Calcium Carb-Cholecalciferol (CALCIUM 600+D3) 600-800 MG-UNIT Tab Take  by mouth.     • Multiple Vitamins-Minerals (MULTIPLE VITAMINS/WOMENS) Tab Take  by mouth.     • losartan (COZAAR) 25 MG Tab Take 1 Tab by mouth every day. 90 Tab 2   • Zoster Vac Recomb Adjuvanted (SHINGRIX) 50 MCG/0.5ML Recon Susp 0.5 mL by Intramuscular route Once for 1 dose. 0.5 mL 1   • estradiol (ESTRACE) 0.1 MG/GM vaginal cream Insert 0.5 g in vagina Every Sunday and Wednesday. 42.5 g 3   • LYRICA 150 MG Cap Take 1 Cap by mouth 3 times a day.     • oxycodone-acetaminophen (PERCOCET) 5-325 MG Tab Take 1-2 Tabs by mouth every four hours as needed.       No current facility-administered medications for this visit.             Current supplements as per medication list.       Allergies: Patient has no known allergies.    Current social contact/activities:      She  reports that she has never smoked. She has never used smokeless tobacco. She reports current alcohol use. She reports that she does not use drugs.  Counseling given: Not Answered      DPA/Advanced Directive:  Patient has Living Will, but it is not on file. Instructed to bring in a copy to scan into their chart.    ROS:    Gait: Uses no assistive device  Ostomy: No  Other tubes: No  Amputations: No  Chronic oxygen use: No  Last eye exam: 2018  Wears hearing aids: No   : Denies any urinary leakage during the last 6 months    Screening:    Depression Screening    Little interest or pleasure  in doing things?  0 - not at all  Feeling down, depressed , or hopeless? 0 - not at all  Patient Health Questionnaire Score: 0     If depressive symptoms identified deferred to follow up visit unless specifically addressed in assessment and plan.    Interpretation of PHQ-9 Total Score   Score Severity   1-4 No Depression   5-9 Mild Depression   10-14 Moderate Depression   15-19 Moderately Severe Depression   20-27 Severe Depression    Screening for Cognitive Impairment    Three Minute Recall (river, nation, finger) 3/3    Ankit clock face with all 12 numbers and set the hands to show 10 past 11.  Yes    Cognitive concerns identified deferred for follow up unless specifically addressed in assessment and plan.    Fall Risk Assessment    Has the patient had two or more falls in the last year or any fall with injury in the last year?  No    Safety Assessment    Throw rugs on floor.  Yes  Handrails on all stairs.  No  Good lighting in all hallways.  Yes  Difficulty hearing.  No  Patient counseled about all safety risks that were identified.    Functional Assessment ADLs    Are there any barriers preventing you from cooking for yourself or meeting nutritional needs?  No.    Are there any barriers preventing you from driving safely or obtaining transportation?  No.    Are there any barriers preventing you from using a telephone or calling for help?  No.    Are there any barriers preventing you from shopping?  No.    Are there any barriers preventing you from taking care of your own finances?  No.    Are there any barriers preventing you from managing your medications?  No.    Are there any barriers preventing you from showering, bathing or dressing yourself?  No.    Are you currently engaging in any exercise or physical activity?  Yes.  eleptical- 3 times a week   What is your perception of your health?  Good.      Health Maintenance Summary                Annual Wellness Visit Overdue 1945     HEPATITIS C SCREENING  Overdue 1945     IMM ZOSTER VACCINES Overdue 11/25/1997     BONE DENSITY Overdue 11/25/2012     MAMMOGRAM Postponed 7/29/2021 Originally 4/23/2019. Patient Refused     Done 4/23/2018 [id number]     Patient has more history with this topic...    IMM INFLUENZA Next Due 9/1/2020      Done 1/7/2020 Imm Admin: Influenza Vaccine Adult HD     Patient has more history with this topic...    PAP SMEAR Next Due 6/26/2022      Done 6/26/2019 PATHOLOGY GYNECOLOGY SPECIMEN     Patient has more history with this topic...    IMM DTaP/Tdap/Td Vaccine Next Due 1/28/2026      Done 1/28/2016 Imm Admin: Tdap Vaccine    COLONOSCOPY Next Due 4/7/2027      Done 4/7/2017 REFERRAL TO GI FOR COLONOSCOPY          Patient Care Team:  Sosa Smalls P.A.-C. as PCP - General (Family Medicine)        Social History     Tobacco Use   • Smoking status: Never Smoker   • Smokeless tobacco: Never Used   Substance Use Topics   • Alcohol use: Yes     Comment: 3 - 4 dirnks per month.    • Drug use: No     Family History   Problem Relation Age of Onset   • Hypertension Mother    • Heart Disease Father    • Hypertension Father    • Colon Cancer Father    • Other Sister         Intellectual Disability    • No Known Problems Daughter    • No Known Problems Daughter      She  has a past medical history of Hypertension.   Past Surgical History:   Procedure Laterality Date   • PB HYSTEROSCOPY,DX,SEP PROC N/A 10/16/2019    Procedure: HYSTEROSCOPY, DIAGNOSTIC;  Surgeon: Erin Gann M.D.;  Location: SURGERY SAME DAY [facility];  Service: Obstetrics   • DILATION AND CURETTAGE N/A 10/16/2019    Procedure: DILATION AND CURETTAGE;  Surgeon: Erin Gann M.D.;  Location: SURGERY SAME DAY [facility];  Service: Obstetrics   • APPENDECTOMY     • CHOLECYSTECTOMY     • TUBAL COAGULATION LAPAROSCOPIC BILATERAL         Exam:   /80 (BP Location: Left arm, Patient Position: Sitting)   Pulse 76   Temp 36.9 °C (98.5 °F) (Temporal)    "Resp 16   Ht 1.676 m (5' 6\")   Wt 75.8 kg (167 lb)   SpO2 93%  Body mass index is 26.95 kg/m².    Hearing good.    Dentition upper dentures and no bottom teeth. Plans on getting implants in the future.   Alert, oriented in no acute distress.  Eye contact is good, speech goal directed, affect calm    Assessment and Plan. The following treatment and monitoring plan is recommended:    1. Medicare annual wellness visit, initial  HRA reviewed and appropriate. Reviewed medical history and current medications with patient. Reviewed immunizations with patient.  Ambulatory and Anticipatory Guidelines have been discussed with patient, see discussion below.      2. Essential hypertension  Chronic but stable problem.  Patient is requesting lisinopril medication be changed to losartan.  States she is read and heard about unpleasant long-term side effects associated with lisinopril.  Patient has been prescribed losartan 25 mg tab once daily.  Discussed comments effects adverse reaction medication with patient.  Lisinopril has been discontinued.    Advised patient to continue monitor blood pressure at home.  Bring in at home document blood pressure readings as well as blood pressure monitor to follow-up appoint.  Discussed proper ways monitor blood pressure with patient.  Continue work on diet, exercise, sleep hygiene, hydration, decrease sodium consumption.  Patient good to plan.    - losartan (COZAAR) 25 MG Tab; Take 1 Tab by mouth every day.  Dispense: 90 Tab; Refill: 2    3. Elevated LDL cholesterol level  Chronic but stable problem.  Lab work is up-to-date.  Continue work on diet and exercise.  Continue monitor.    4. Cystocele with uterine prolapse  5. Rectocele  Chronic but  stable problem.  Symptoms are managed with pessary area.  Patient follows up with her OB/GYN regularly.  No further work-up indicated.    6. Post herpetic neuralgia  Chronic stable problem.  Patient takes Lyrica as prescribed.  Medications managed by " her pain management provider.  No side effects or complications.  Patient also takes Percocet as needed for pain symptoms.  Medication is managed by her pain management provider.  Continue to monitor.    7. Vitamin D deficiency  Chronic but stable problem.  Continue vitamin D supplementation.  Continue monitor.    8. Need for vaccination    - Zoster Vac Recomb Adjuvanted (SHINGRIX) 50 MCG/0.5ML Recon Susp; 0.5 mL by Intramuscular route Once for 1 dose.  Dispense: 0.5 mL; Refill: 1    1. Medicare annual wellness visit, initial     2. Essential hypertension  losartan (COZAAR) 25 MG Tab   3. Elevated LDL cholesterol level     4. Cystocele with uterine prolapse     5. Rectocele     6. Post herpetic neuralgia     7. Vitamin D deficiency     8. Need for vaccination  Zoster Vac Recomb Adjuvanted (SHINGRIX) 50 MCG/0.5ML Recon Susp         Services suggested: No services needed at this time  Health Care Screening: Age-appropriate preventive services recommended by USPTF and ACIP covered by Medicare were discussed today. Services ordered if indicated and agreed upon by the patient.  Referrals offered: Community-based lifestyle interventions to reduce health risks and promote self-management and wellness, fall prevention, nutrition, physical activity, tobacco-use cessation, weight loss, and mental health services as per orders if indicated.    Discussion today about general wellness and lifestyle habits:    · Prevent falls and reduce trip hazards; Cautioned about securing or removing rugs.  · Have a working fire alarm and carbon monoxide detector;   · Engage in regular physical activity and social activities     Follow-up: No follow-ups on file.

## 2020-09-10 ENCOUNTER — OFFICE VISIT (OUTPATIENT)
Dept: MEDICAL GROUP | Facility: PHYSICIAN GROUP | Age: 75
End: 2020-09-10
Payer: MEDICARE

## 2020-09-10 VITALS
HEART RATE: 75 BPM | RESPIRATION RATE: 20 BRPM | TEMPERATURE: 98.2 F | SYSTOLIC BLOOD PRESSURE: 130 MMHG | OXYGEN SATURATION: 93 % | DIASTOLIC BLOOD PRESSURE: 80 MMHG | HEIGHT: 66 IN | WEIGHT: 166.2 LBS | BODY MASS INDEX: 26.71 KG/M2

## 2020-09-10 DIAGNOSIS — I10 ESSENTIAL HYPERTENSION: ICD-10-CM

## 2020-09-10 PROCEDURE — 99214 OFFICE O/P EST MOD 30 MIN: CPT | Performed by: PHYSICIAN ASSISTANT

## 2020-09-10 RX ORDER — LOSARTAN POTASSIUM 50 MG/1
50 TABLET ORAL DAILY
Qty: 90 TAB | Refills: 1 | Status: SHIPPED | OUTPATIENT
Start: 2020-09-10 | End: 2020-10-08

## 2020-09-10 ASSESSMENT — FIBROSIS 4 INDEX: FIB4 SCORE: 1.84

## 2020-09-10 NOTE — PROGRESS NOTES
Chief Complaint   Patient presents with   • Follow-Up     HTN, and meds check        HISTORY OF PRESENT ILLNESS: Rosenda Cowan is an established 74 y.o. female here to discuss the evaluation and management of:    Patient is a pleasant 74-year-old female here today follow-up on hypertension.  During her last appointment on 7/29/2020 patient was prescribed losartan 25 mg tab once daily.  She tells me that she is been taking medication compliantly and experiences no side effects or complications medication.  States she has been monitoring her blood pressure at home and systolic can range from the 130-150 range and diastolic is within normal limits.  Patient suffers from chronic pain symptoms due to post her Palgic neuralgia.  She is following up with pain management for pain symptoms.  She denies chest pain, shortness breath, heart palpitations, dizziness, syncope constipated, vision changes, peripheral edema.  Denies dry cough.  She tells me that her diet is healthy.  Denies eating a diet high in sodium.  States due to the smoke she is not been able to exercise like she would like but tries live an active lifestyle.      Patient Active Problem List    Diagnosis Date Noted   • Elevated LDL cholesterol level 01/07/2020   • Vitamin D deficiency 10/01/2019   • Post herpetic neuralgia 06/14/2019   • Essential hypertension 06/14/2019   • Rectocele 06/14/2019   • Cystocele with uterine prolapse 12/28/2017       Allergies:Patient has no known allergies.    Current Outpatient Medications   Medication Sig Dispense Refill   • losartan (COZAAR) 50 MG Tab Take 1 Tab by mouth every day. 90 Tab 1   • Calcium Carb-Cholecalciferol (CALCIUM 600+D3) 600-800 MG-UNIT Tab Take  by mouth.     • Multiple Vitamins-Minerals (MULTIPLE VITAMINS/WOMENS) Tab Take  by mouth.     • estradiol (ESTRACE) 0.1 MG/GM vaginal cream Insert 0.5 g in vagina Every Sunday and Wednesday. 42.5 g 3   • LYRICA 150 MG Cap Take 1 Cap by mouth 3 times a day.      • oxycodone-acetaminophen (PERCOCET) 5-325 MG Tab Take 1-2 Tabs by mouth every four hours as needed.       No current facility-administered medications for this visit.        Social History     Tobacco Use   • Smoking status: Never Smoker   • Smokeless tobacco: Never Used   Substance Use Topics   • Alcohol use: Yes     Comment: 3 - 4 dirnks per month.    • Drug use: No       Family Status   Relation Name Status   • Mo     • Fa     • Sis  Alive   • Sis  Alive   • Sis  Alive   • Bridger  Alive   • Bridger  Alive     Family History   Problem Relation Age of Onset   • Hypertension Mother    • Heart Disease Father    • Hypertension Father    • Colon Cancer Father    • Other Sister         Intellectual Disability    • No Known Problems Daughter    • No Known Problems Daughter        ROS:  Review of Systems   Constitutional: Negative for fever, chills, weight loss and malaise/fatigue.   HENT: Negative for ear pain, nosebleeds, congestion, sore throat and neck pain.    Eyes: Negative for blurred vision.   Respiratory: Negative for cough, sputum production, shortness of breath and wheezing.    Cardiovascular: Negative for chest pain, palpitations, orthopnea and leg swelling.   Gastrointestinal: Negative for heartburn, nausea, vomiting and abdominal pain.   Genitourinary: Negative for dysuria, urgency and frequency.   Musculoskeletal: Negative for myalgias, back pain and joint pain.   Skin: Negative for rash and itching.   Neurological: Negative for dizziness, tingling, tremors, sensory change, focal weakness and headaches.   Endo/Heme/Allergies: Does not bruise/bleed easily.   Psychiatric/Behavioral: Negative for depression, suicidal ideas and memory loss.  The patient is not nervous/anxious and does not have insomnia.    All other systems reviewed and are negative except as in HPI.    Exam: /80 (BP Location: Left arm, Patient Position: Sitting, BP Cuff Size: Adult)   Pulse 75   Temp 36.8 °C (98.2 °F)  "(Temporal)   Resp 20   Ht 1.676 m (5' 6\")   Wt 75.4 kg (166 lb 3.2 oz)   SpO2 93%  Body mass index is 26.83 kg/m².  General: Normal appearing. No distress.  HEENT: Normocephalic. Eyes conjunctiva clear lids without ptosis, ears normal shape and contour.  Neck: Supple without JVD. Thyroid is not enlarged.  Pulmonary: Clear to ausculation.  Normal effort. No rales, ronchi, or wheezing.  Cardiovascular: Regular rate and rhythm without murmur.   Abdomen: Soft, nontender, nondistended.   Neurologic: Grossly nonfocal.  Cranial nerves are normal.   Skin: Warm and dry.  No rashes or suspicious skin lesions.  Musculoskeletal: Normal gait. No extremity cyanosis, clubbing, or edema.  Psych: Normal mood and affect. Alert and oriented x3. Judgment and insight is normal.    Medical decision-making and discussion:  1. Essential hypertension  Chronic problem.  Could be better controlled.  Discontinued losartan 25 mg.  Increase losartan dosage from 25 mg tab once daily to 50 mg tab once daily.  Patient will follow-up in 1 month reevaluation.  Continue monitoring blood pressure at home.  Discussed proper ways to monitor blood pressure at home.  Advised patient to bring in at home documented readings as well as blood pressure monitor follow-up appoint.  Patient agreed to plan.  Continue work on diet, exercise, sleep hygiene, hydration, and developing healthy coping mechanisms for stress anxiety.  Continue low-sodium diet.    Discussed ED precautions.    - losartan (COZAAR) 50 MG Tab; Take 1 Tab by mouth every day.  Dispense: 90 Tab; Refill: 1      Please note that this dictation was created using voice recognition software. I have made every reasonable attempt to correct obvious errors, but I expect that there are errors of grammar and possibly content that I did not discover before finalizing the note.    Assessment/Plan:  1. Essential hypertension  losartan (COZAAR) 50 MG Tab       Return in about 1 month (around 10/10/2020).  "

## 2020-10-05 ENCOUNTER — GYNECOLOGY VISIT (OUTPATIENT)
Dept: OBGYN | Facility: CLINIC | Age: 75
End: 2020-10-05
Payer: MEDICARE

## 2020-10-05 VITALS — DIASTOLIC BLOOD PRESSURE: 84 MMHG | SYSTOLIC BLOOD PRESSURE: 165 MMHG | BODY MASS INDEX: 26.92 KG/M2 | WEIGHT: 166.8 LBS

## 2020-10-05 DIAGNOSIS — N81.6 RECTOCELE: ICD-10-CM

## 2020-10-05 DIAGNOSIS — N81.4 CYSTOCELE WITH UTERINE PROLAPSE: ICD-10-CM

## 2020-10-05 DIAGNOSIS — Z46.89 PESSARY MAINTENANCE: ICD-10-CM

## 2020-10-05 DIAGNOSIS — N95.2 VAGINAL ATROPHY: ICD-10-CM

## 2020-10-05 PROCEDURE — 99213 OFFICE O/P EST LOW 20 MIN: CPT | Performed by: OBSTETRICS & GYNECOLOGY

## 2020-10-05 RX ORDER — ESTRADIOL 0.1 MG/G
0.5 CREAM VAGINAL
Qty: 42.5 G | Refills: 5 | Status: SHIPPED | OUTPATIENT
Start: 2020-10-07 | End: 2022-05-23

## 2020-10-05 ASSESSMENT — FIBROSIS 4 INDEX: FIB4 SCORE: 1.84

## 2020-10-05 NOTE — NON-PROVIDER
Pt here for Pessary cleaning  Good Phone#: [phone]  Pharmacy verified.  Pt states no other complaints for today.

## 2020-10-05 NOTE — Clinical Note
Cute patient.  But vagina had ulcerations everywhere.  I had her keep pessary out, increase estrogen to nightly for 2 weeks then follow up for placement in 3-4 weeks.

## 2020-10-05 NOTE — PROGRESS NOTES
Pessary Insertion:  S: 74 y.o.  here for pessary insertion and cleaning  Pessary type: ring with support #3    PE:  Vitals:    10/05/20 1305   BP: (!) 165/84     SSE: atrophy, vaginal walls ulcerated and bleeding throughout, silver nitrate applied with hemostasis achieved, pessary left out     AP:   Increase estrogen to daily for 2 weeks then return for placement of device   Estrogen refilled   Likely can only keep pessary device in for 2 months at a time until vagina well healed     Patient was seen for 20 minutes of which > 50% of appointment time was spent on face-to-face counseling and coordination of care regarding the above.

## 2020-10-08 ENCOUNTER — OFFICE VISIT (OUTPATIENT)
Dept: MEDICAL GROUP | Facility: PHYSICIAN GROUP | Age: 75
End: 2020-10-08
Payer: MEDICARE

## 2020-10-08 VITALS
BODY MASS INDEX: 26.81 KG/M2 | RESPIRATION RATE: 16 BRPM | TEMPERATURE: 98.2 F | HEART RATE: 72 BPM | HEIGHT: 66 IN | DIASTOLIC BLOOD PRESSURE: 78 MMHG | SYSTOLIC BLOOD PRESSURE: 149 MMHG | OXYGEN SATURATION: 95 % | WEIGHT: 166.8 LBS

## 2020-10-08 DIAGNOSIS — I10 ESSENTIAL HYPERTENSION: ICD-10-CM

## 2020-10-08 DIAGNOSIS — Z23 NEED FOR VACCINATION: ICD-10-CM

## 2020-10-08 PROCEDURE — 90662 IIV NO PRSV INCREASED AG IM: CPT | Performed by: PHYSICIAN ASSISTANT

## 2020-10-08 PROCEDURE — 99213 OFFICE O/P EST LOW 20 MIN: CPT | Mod: 25 | Performed by: PHYSICIAN ASSISTANT

## 2020-10-08 PROCEDURE — G0008 ADMIN INFLUENZA VIRUS VAC: HCPCS | Performed by: PHYSICIAN ASSISTANT

## 2020-10-08 RX ORDER — LOSARTAN POTASSIUM 100 MG/1
100 TABLET ORAL DAILY
Qty: 30 TAB | Refills: 2 | Status: SHIPPED | OUTPATIENT
Start: 2020-10-08 | End: 2020-10-08

## 2020-10-08 RX ORDER — LOSARTAN POTASSIUM 50 MG/1
TABLET ORAL
Qty: 135 TAB | Refills: 1 | Status: SHIPPED | OUTPATIENT
Start: 2020-10-08 | End: 2020-12-11

## 2020-10-08 ASSESSMENT — FIBROSIS 4 INDEX: FIB4 SCORE: 1.84

## 2020-10-08 NOTE — PROGRESS NOTES
Chief Complaint   Patient presents with   • Follow-Up     HTN       HISTORY OF PRESENT ILLNESS: Rosenda Cowna is an established 74 y.o. female here to discuss the evaluation and management of:    Patient is a pleasant 74-year-old female here today follow-up on hypertension.  During her last appointment on 10/5/2020 losartan dosage was increased to 25 mg tab once daily to 50 mg tab once daily.  She tells me that she is been taking medication compliantly and experiences no side effects or complications medication.  Patient has brought in at home document blood pressure readings.  Patient's blood pressure monitor tolerating was 9 points lower and diastolic was 4 points higher than medical assistant's reading.  Patient's blood pressure is uncontrolled.  Patient's blood pressure during today's appointment 158/82 mmHg.  She tells me that she has been exercising almost daily and exercise routine consist of cardiovascular 15 minutes per time.  She feels her diet is healthy and denies a diet high in sodium.  She denies chest pain, shortness breath, heart palpitations, dizziness, syncope, sweating, vision changes, peripheral edema.  Patient does suffer from chronic pain symptoms.  She suffers from post herpatic neuralgia.  Patient is frustrated that pain symptoms are not better controlled.  States she does follow-up with pain management but wishes there was better treatment options for her current pain symptoms.  States nerve block has been tried as well as other medications with no improvement of symptoms.  States Lyrica does help improve symptoms but she still in constant pain.      Patient Active Problem List    Diagnosis Date Noted   • Elevated LDL cholesterol level 01/07/2020   • Vitamin D deficiency 10/01/2019   • Post herpetic neuralgia 06/14/2019   • Essential hypertension 06/14/2019   • Rectocele 06/14/2019   • Cystocele with uterine prolapse 12/28/2017       Allergies:Patient has no known allergies.    Current  Outpatient Medications   Medication Sig Dispense Refill   • losartan (COZAAR) 50 MG Tab Take 1.5 tablets by mouth once daily. 135 Tab 1   • estradiol (ESTRACE) 0.1 MG/GM vaginal cream Insert 0.5 g in vagina Every  and Wednesday. 42.5 g 5   • Calcium Carb-Cholecalciferol (CALCIUM 600+D3) 600-800 MG-UNIT Tab Take  by mouth.     • Multiple Vitamins-Minerals (MULTIPLE VITAMINS/WOMENS) Tab Take  by mouth.     • LYRICA 150 MG Cap Take 1 Cap by mouth 3 times a day.     • oxycodone-acetaminophen (PERCOCET) 5-325 MG Tab Take 1-2 Tabs by mouth every four hours as needed.       No current facility-administered medications for this visit.        Social History     Tobacco Use   • Smoking status: Never Smoker   • Smokeless tobacco: Never Used   Substance Use Topics   • Alcohol use: Yes     Comment: 3 - 4 dirnks per month.    • Drug use: No       Family Status   Relation Name Status   • Mo     • Fa     • Sis  Alive   • Sis  Alive   • Sis  Alive   • Bridger  Alive   • Bridger  Alive     Family History   Problem Relation Age of Onset   • Hypertension Mother    • Heart Disease Father    • Hypertension Father    • Colon Cancer Father    • Other Sister         Intellectual Disability    • No Known Problems Daughter    • No Known Problems Daughter        ROS:  Review of Systems   Constitutional: Negative for fever, chills, weight loss and malaise/fatigue.   HENT: Negative for ear pain, nosebleeds, congestion, sore throat and neck pain.    Eyes: Negative for blurred vision.   Respiratory: Negative for cough, sputum production, shortness of breath and wheezing.    Cardiovascular: Negative for chest pain, palpitations, orthopnea and leg swelling.   Gastrointestinal: Negative for heartburn, nausea, vomiting and abdominal pain.   Genitourinary: Negative for dysuria, urgency and frequency.   Musculoskeletal: Negative for myalgias, back pain and joint pain.   Skin: Negative for rash and itching.   Neurological: Negative for  "dizziness, tingling, tremors, sensory change, focal weakness and headaches.   Endo/Heme/Allergies: Does not bruise/bleed easily.   Psychiatric/Behavioral: Negative for depression, suicidal ideas and memory loss.  The patient is not nervous/anxious and does not have insomnia.    All other systems reviewed and are negative except as in HPI.    Exam: /78 (BP Location: Left arm, Patient Position: Sitting, BP Cuff Size: Adult)   Pulse 72   Temp 36.8 °C (98.2 °F) (Temporal)   Resp 16   Ht 1.676 m (5' 6\")   Wt 75.7 kg (166 lb 12.8 oz)   SpO2 95%  Body mass index is 26.92 kg/m².  General: Normal appearing. No distress.  HEENT: Normocephalic. Eyes conjunctiva clear lids without ptosis, ears normal shape and contour.  Neck: Supple without JVD. Thyroid is not enlarged.  Pulmonary: Clear to ausculation.  Normal effort. No rales, ronchi, or wheezing.  Cardiovascular: Regular rate and rhythm with murmur.   Abdomen: Nondistended.   Neurologic: Grossly nonfocal.  Cranial nerves are normal.   Skin: Warm and dry.  No rashes or suspicious skin lesions.  Musculoskeletal: Normal gait. No extremity cyanosis, clubbing, or edema.  Psych: Normal mood and affect. Alert and oriented x3. Judgment and insight is normal.    Medical decision-making and discussion:  1. Essential hypertension  Phonic problem.  Uncontrolled.  Increase losartan from 50 mg tab once daily to 75 once daily.  Patient agreed to plan.  Advised patient work on diet, exercise, sleep hygiene, decreasing sodium consumption, hydration, and developing healthy coping mechanisms for stress anxiety.  Advised patient to continue monitoring blood pressure at home.  Patient follow-up in 3 weeks for reevaluation.  Patient agreed to plan.  Discussed emergency room precautions.    Continue following up with pain management for post herpetic neuralgia.  Sympathized with patient.      - losartan (COZAAR) 50 MG Tab; Take 1.5 tablets by mouth once daily.  Dispense: 135 Tab; " Refill: 1    2. Need for vaccination  A flu vaccination was administered to patient without complication. A VIS form was provided to patient.     - INFLUENZA VACCINE, HIGH DOSE (65+ ONLY)      Please note that this dictation was created using voice recognition software. I have made every reasonable attempt to correct obvious errors, but I expect that there are errors of grammar and possibly content that I did not discover before finalizing the note.    Assessment/Plan:  1. Essential hypertension  losartan (COZAAR) 50 MG Tab    DISCONTINUED: losartan (COZAAR) 100 MG Tab   2. Need for vaccination  INFLUENZA VACCINE, HIGH DOSE (65+ ONLY)       Return in about 3 weeks (around 10/29/2020).

## 2020-11-04 ENCOUNTER — OFFICE VISIT (OUTPATIENT)
Dept: MEDICAL GROUP | Facility: PHYSICIAN GROUP | Age: 75
End: 2020-11-04
Payer: MEDICARE

## 2020-11-04 VITALS
HEART RATE: 79 BPM | HEIGHT: 66 IN | TEMPERATURE: 98.5 F | WEIGHT: 169 LBS | SYSTOLIC BLOOD PRESSURE: 130 MMHG | OXYGEN SATURATION: 94 % | DIASTOLIC BLOOD PRESSURE: 90 MMHG | RESPIRATION RATE: 15 BRPM | BODY MASS INDEX: 27.16 KG/M2

## 2020-11-04 DIAGNOSIS — I10 ESSENTIAL HYPERTENSION: ICD-10-CM

## 2020-11-04 PROCEDURE — 99214 OFFICE O/P EST MOD 30 MIN: CPT | Performed by: PHYSICIAN ASSISTANT

## 2020-11-04 ASSESSMENT — FIBROSIS 4 INDEX: FIB4 SCORE: 1.84

## 2020-11-04 NOTE — PROGRESS NOTES
Chief Complaint   Patient presents with   • Hypertension Follow-up       HISTORY OF PRESENT ILLNESS: Rosenda Cowan is an established 74 y.o. female here to discuss the evaluation and management of:    Patient is a pleasant 72-year-old female here today follow-up on hypertension.  During her last appointment on 10/8/2020 antihypertensive medication losartan was increased from 50 mg tab once daily to 75 mg once daily.  She tells me that she is been taking 75 mg once daily and denies side effects or complications.  Patient's blood pressure during today's appointment 130/90 mmHg.  Patient suffers from chronic pain symptoms.  She has postherpetic neuralgia and this is a chronic problem and pain symptoms are recently exacerbated.  She is following up with pain management closely.  She tells me that her diet is healthy and she denies a diet high in sodium.  States that she has been walking more regularly but is aware that she could increase cardiovascular exercise.  She denies chest pain, shortness of breath, heart palpitations, dizziness, syncope constipated, vision changes, peripheral edema.  She denies dry cough.      Patient Active Problem List    Diagnosis Date Noted   • Elevated LDL cholesterol level 01/07/2020   • Vitamin D deficiency 10/01/2019   • Post herpetic neuralgia 06/14/2019   • Essential hypertension 06/14/2019   • Rectocele 06/14/2019   • Cystocele with uterine prolapse 12/28/2017       Allergies:Patient has no known allergies.    Current Outpatient Medications   Medication Sig Dispense Refill   • losartan (COZAAR) 50 MG Tab Take 1.5 tablets by mouth once daily. 135 Tab 1   • estradiol (ESTRACE) 0.1 MG/GM vaginal cream Insert 0.5 g in vagina Every Sunday and Wednesday. 42.5 g 5   • Calcium Carb-Cholecalciferol (CALCIUM 600+D3) 600-800 MG-UNIT Tab Take  by mouth.     • Multiple Vitamins-Minerals (MULTIPLE VITAMINS/WOMENS) Tab Take  by mouth.     • LYRICA 150 MG Cap Take 1 Cap by mouth 3 times a day.      • oxycodone-acetaminophen (PERCOCET) 5-325 MG Tab Take 1-2 Tabs by mouth every four hours as needed.       No current facility-administered medications for this visit.        Social History     Tobacco Use   • Smoking status: Never Smoker   • Smokeless tobacco: Never Used   Substance Use Topics   • Alcohol use: Yes     Comment: 3 - 4 dirnks per month.    • Drug use: No       Family Status   Relation Name Status   • Mo     • Fa     • Sis  Alive   • Sis  Alive   • Sis  Alive   • Bridger  Alive   • Bridger  Alive     Family History   Problem Relation Age of Onset   • Hypertension Mother    • Heart Disease Father    • Hypertension Father    • Colon Cancer Father    • Other Sister         Intellectual Disability    • No Known Problems Daughter    • No Known Problems Daughter        ROS:  Review of Systems   Constitutional: Negative for fever, chills, weight loss and malaise/fatigue.   HENT: Negative for ear pain, nosebleeds, congestion, sore throat and neck pain.    Eyes: Negative for blurred vision.   Respiratory: Negative for cough, sputum production, shortness of breath and wheezing.    Cardiovascular: Negative for chest pain, palpitations, orthopnea and leg swelling.   Gastrointestinal: Negative for heartburn, nausea, vomiting and abdominal pain.   Genitourinary: Negative for dysuria, urgency and frequency.   Musculoskeletal: Negative for myalgias, back pain and joint pain.   Skin: Negative for rash and itching.   Neurological: Negative for dizziness, tingling, tremors, sensory change, focal weakness and headaches.   Endo/Heme/Allergies: Does not bruise/bleed easily.   Psychiatric/Behavioral: Negative for depression, suicidal ideas and memory loss.  The patient is not nervous/anxious and does not have insomnia.    All other systems reviewed and are negative except as in HPI.    Exam: /90 (BP Location: Left arm, Patient Position: Sitting, BP Cuff Size: Adult)   Pulse 79   Temp 36.9 °C (98.5 °F)  "(Temporal)   Resp 15   Ht 1.676 m (5' 6\")   Wt 76.7 kg (169 lb)   SpO2 94%  Body mass index is 27.28 kg/m².  General: Normal appearing. No distress.  HEENT: Normocephalic. Eyes conjunctiva clear lids without ptosis, ears normal shape and contour.  Neck: Supple without JVD. Thyroid is not enlarged.  Pulmonary: Clear to ausculation.  Normal effort. No rales, ronchi, or wheezing.  Cardiovascular: Regular rate and rhythm without murmur.   Abdomen: Soft, nontender, nondistended.   Neurologic: Grossly nonfocal.  Cranial nerves are normal.   \Skin: Warm and dry.  No rashes or suspicious skin lesions.  Musculoskeletal: Normal gait. No extremity cyanosis, clubbing, or edema.  Psych: Normal mood and affect. Alert and oriented x3. Judgment and insight is normal.    Medical decision-making and discussion:  1. Essential hypertension  Problem.  Could be better controlled.  Patient's blood pressure during today's appointment 130/90 mmHg.  Patient brought in at home document blood pressure readings.  Readings have been scanned to patient's chart.  Patient is currently taking losartan 75 mg once daily.  During today's appointment advised patient increase losartan 75 mg once daily 200 mg once daily.  Patient read to plan.  Patient will follow-up in 1 month reevaluation.  Continue work on diet, exercise, sleep hygiene, developing healthy coping mechanisms for stress anxiety, decreasing sodium consumption.  I suspect uncontrolled hypertension secondary to chronic pain symptoms that wax and wanes.    Discussed Emergency room precautions.      Please note that this dictation was created using voice recognition software. I have made every reasonable attempt to correct obvious errors, but I expect that there are errors of grammar and possibly content that I did not discover before finalizing the note.    Assessment/Plan:  1. Essential hypertension         Return in about 1 month (around 12/4/2020).  "

## 2020-11-09 ENCOUNTER — GYNECOLOGY VISIT (OUTPATIENT)
Dept: OBGYN | Facility: CLINIC | Age: 75
End: 2020-11-09
Payer: MEDICARE

## 2020-11-09 VITALS — WEIGHT: 167.1 LBS | SYSTOLIC BLOOD PRESSURE: 132 MMHG | DIASTOLIC BLOOD PRESSURE: 68 MMHG | BODY MASS INDEX: 26.97 KG/M2

## 2020-11-09 DIAGNOSIS — N81.4 CYSTOCELE WITH UTERINE PROLAPSE: ICD-10-CM

## 2020-11-09 PROCEDURE — 99213 OFFICE O/P EST LOW 20 MIN: CPT | Performed by: OBSTETRICS & GYNECOLOGY

## 2020-11-09 ASSESSMENT — FIBROSIS 4 INDEX: FIB4 SCORE: 1.84

## 2020-11-09 NOTE — PROGRESS NOTES
Pessary Insertion:  S: 74 y.o.  here for pessary insertion and cleaning  Pessary type: #3 ring with support    PE:  Vitals:    20 0918   BP: 132/68     SSE: normal vagina, with some irritation in posterior wall near vaginal cuff on right side, otherwise much better than last time    AP:   Return for cleaning in 2 months.  Will extend to 3 months if able next time  Estrogen vaginally three times a week   Pt is to call for any pain, discharge or bleeding from the device.

## 2020-12-11 ENCOUNTER — OFFICE VISIT (OUTPATIENT)
Dept: MEDICAL GROUP | Facility: PHYSICIAN GROUP | Age: 75
End: 2020-12-11
Payer: MEDICARE

## 2020-12-11 VITALS
RESPIRATION RATE: 15 BRPM | SYSTOLIC BLOOD PRESSURE: 138 MMHG | HEIGHT: 66 IN | WEIGHT: 170 LBS | OXYGEN SATURATION: 96 % | TEMPERATURE: 97.9 F | BODY MASS INDEX: 27.32 KG/M2 | DIASTOLIC BLOOD PRESSURE: 70 MMHG | HEART RATE: 78 BPM

## 2020-12-11 DIAGNOSIS — E78.00 ELEVATED LDL CHOLESTEROL LEVEL: ICD-10-CM

## 2020-12-11 DIAGNOSIS — E55.9 VITAMIN D DEFICIENCY: ICD-10-CM

## 2020-12-11 DIAGNOSIS — G47.01 INSOMNIA DUE TO MEDICAL CONDITION: ICD-10-CM

## 2020-12-11 DIAGNOSIS — I10 ESSENTIAL HYPERTENSION: ICD-10-CM

## 2020-12-11 DIAGNOSIS — F32.1 CURRENT MODERATE EPISODE OF MAJOR DEPRESSIVE DISORDER WITHOUT PRIOR EPISODE (HCC): ICD-10-CM

## 2020-12-11 PROCEDURE — 99214 OFFICE O/P EST MOD 30 MIN: CPT | Performed by: PHYSICIAN ASSISTANT

## 2020-12-11 RX ORDER — MIRTAZAPINE 15 MG/1
15 TABLET, FILM COATED ORAL
Qty: 90 TAB | Refills: 0 | Status: SHIPPED | OUTPATIENT
Start: 2020-12-11 | End: 2021-01-07

## 2020-12-11 RX ORDER — LOSARTAN POTASSIUM 100 MG/1
100 TABLET ORAL DAILY
Qty: 90 TAB | Refills: 3 | Status: SHIPPED | OUTPATIENT
Start: 2020-12-11 | End: 2021-10-27 | Stop reason: SDUPTHER

## 2020-12-11 ASSESSMENT — PATIENT HEALTH QUESTIONNAIRE - PHQ9
CLINICAL INTERPRETATION OF PHQ2 SCORE: 2
SUM OF ALL RESPONSES TO PHQ QUESTIONS 1-9: 11
5. POOR APPETITE OR OVEREATING: 3 - NEARLY EVERY DAY

## 2020-12-11 ASSESSMENT — FIBROSIS 4 INDEX: FIB4 SCORE: 1.86

## 2020-12-11 NOTE — PROGRESS NOTES
Chief Complaint   Patient presents with   • Hypertension Follow-up       HISTORY OF PRESENT ILLNESS: Rosenda Cowan is an established 75 y.o. female here to discuss the evaluation and management of:      Essential hypertension  Chronic stable problem.  Their last plan 11/9/2020 losartan dosage was increased from 75 mg once daily to 100 mg once daily.  She tells me she is been taking medication compliantly and experiences no side effects or complications of medication.  Patient has brought in at home document blood pressure readings.   Patient's blood pressure fluctuates when she is experiencing pain symptoms.  Patient suffers from chronic post herpetic neuralgia follows up with pain management.  Pain symptoms are uncontrolled.    Denies chest pain, shortness breath, heart palpitations, dizziness, syncope, severe headache, vision changes, peripheral edema.    Insomnia due to medical condition  Current moderate episode of major depressive disorder without prior episode (HCC)  Patient is suffering from insomnia due to chronic pain symptoms secondary to postherpetic neuralgia and is also experiencing depression due to chronic pain symptoms.  Patient is having difficulty falling and staying asleep and on average may sleep 6 hours a night.  States her overall mood is affected from lack of sleep.  States she is also feeling depressed due to her chronic condition and inability to get pain symptoms under control.  She denies homicidal or suicidal ideation.  Patient is inquiring about treatment options.      Depression Screening    Little interest or pleasure in doing things?  1 - several days   Feeling down, depressed , or hopeless? 1 - several days   Trouble falling or staying asleep, or sleeping too much?  3 - nearly every day   Feeling tired or having little energy?  3 - nearly every day   Poor appetite or overeating?  3 - nearly every day   Feeling bad about yourself - or that you are a failure or have let yourself  or your family down? 0 - not at all   Trouble concentrating on things, such as reading the newspaper or watching television? 0 - not at all   Moving or speaking so slowly that other people could have noticed.  Or the opposite - being so fidgety or restless that you have been moving around a lot more than usual?  0 - not at all   Thoughts that you would be better off dead, or of hurting yourself?  0 - not at all   Patient Health Questionnaire Score: 11       If depressive symptoms identified deferred to follow up visit unless specifically addressed in assesment and plan.    Interpretation of PHQ-9 Total Score   Score Severity   1-4 No Depression   5-9 Mild Depression   10-14 Moderate Depression   15-19 Moderately Severe Depression   20-27 Severe Depression        Vitamin D deficiency  Chronic but stable problem.  Could improve.  Patient's vitamin D is on the lower end of normal.  She tells me that she takes over-the-counter 1000 units of vitamin D once daily and also a multivitamin that contains vitamin D.  Patient on the recall dosage of vitamin D and multivitamin.  Denies side effects or complications from supplementation.    Elevated LDL cholesterol level  Chronic but stable problem.  Patient is on a statin medication.  Patient's 10-year ASCVD a medium risk and 10-year CVD risk score is high.  She is aware her diet could improve.  Tries with active lifestyle.        Patient Active Problem List    Diagnosis Date Noted   • Elevated LDL cholesterol level 01/07/2020   • Vitamin D deficiency 10/01/2019   • Post herpetic neuralgia 06/14/2019   • Essential hypertension 06/14/2019   • Rectocele 06/14/2019   • Cystocele with uterine prolapse 12/28/2017       Allergies:Patient has no known allergies.    Current Outpatient Medications   Medication Sig Dispense Refill   • losartan (COZAAR) 100 MG Tab Take 1 Tab by mouth every day. 90 Tab 3   • mirtazapine (REMERON) 15 MG Tab Take 1 Tab by mouth every bedtime. 90 Tab 0   •  estradiol (ESTRACE) 0.1 MG/GM vaginal cream Insert 0.5 g in vagina Every  and Wednesday. 42.5 g 5   • Calcium Carb-Cholecalciferol (CALCIUM 600+D3) 600-800 MG-UNIT Tab Take  by mouth.     • Multiple Vitamins-Minerals (MULTIPLE VITAMINS/WOMENS) Tab Take  by mouth.     • LYRICA 150 MG Cap Take 1 Cap by mouth 3 times a day.     • oxycodone-acetaminophen (PERCOCET) 5-325 MG Tab Take 1-2 Tabs by mouth every four hours as needed.       No current facility-administered medications for this visit.        Social History     Tobacco Use   • Smoking status: Never Smoker   • Smokeless tobacco: Never Used   Substance Use Topics   • Alcohol use: Yes     Comment: 3 - 4 dirnks per month.    • Drug use: No       Family Status   Relation Name Status   • Mo     • Fa     • Sis  Alive   • Sis  Alive   • Sis  Alive   • Bridger  Alive   • Bridger  Alive     Family History   Problem Relation Age of Onset   • Hypertension Mother    • Heart Disease Father    • Hypertension Father    • Colon Cancer Father    • Other Sister         Intellectual Disability    • No Known Problems Daughter    • No Known Problems Daughter        ROS:  Review of Systems   Constitutional: Negative for fever, chills, weight loss and malaise/fatigue.   HENT: Negative for ear pain, nosebleeds, congestion, sore throat and neck pain.    Eyes: Negative for blurred vision.   Respiratory: Negative for cough, sputum production, shortness of breath and wheezing.    Cardiovascular: Negative for chest pain, palpitations, orthopnea and leg swelling.   Gastrointestinal: Negative for heartburn, nausea, vomiting and abdominal pain.   Genitourinary: Negative for dysuria, urgency and frequency.   Musculoskeletal: Negative for myalgias and joint pain. + for back pain.  Skin: Negative for rash and itching.   Neurological: Negative for dizziness, tingling, tremors, sensory change, focal weakness and headaches.   Endo/Heme/Allergies: Does not bruise/bleed easily.  "  Psychiatric/Behavioral: Negative for suicidal ideas and memory loss.  The patient is not nervous/anxious. + for insomnia and depression.  All other systems reviewed and are negative except as in HPI.    Exam: /70 (BP Location: Right arm, Patient Position: Sitting, BP Cuff Size: Adult)   Pulse 78   Temp 36.6 °C (97.9 °F) (Temporal)   Resp 15   Ht 1.676 m (5' 6\")   Wt 77.1 kg (170 lb)   SpO2 96%  Body mass index is 27.44 kg/m².  General: Normal appearing. No distress.  HEENT: Normocephalic. Eyes conjunctiva clear lids without ptosis, pupils equal and reactive to light accommodation, ears normal shape and contour.  Neck: Supple without JVD. Thyroid is not enlarged.  Pulmonary: Clear to ausculation.  Normal effort. No rales, ronchi, or wheezing.  Cardiovascular: Regular rate and rhythm without murmur.   Abdomen: Nondistended.   Neurologic: Grossly nonfocal.  Cranial nerves are normal.  Lymph: No cervical, supraclavicular or axillary lymph nodes are palpable  Skin: Warm and dry.  No rashes or suspicious skin lesions.  Musculoskeletal: Normal gait. No extremity cyanosis, clubbing, or edema.  Psych: Normal mood and affect. Alert and oriented x3. Judgment and insight is normal.    Medical decision-making and discussion:  1. Essential hypertension  Well-controlled. Labs as indicated. Continue antihypertensive medications. Discussed decreasing salt intake. Emphasized benefits of exercise and diet. Continue to monitor.    Patient brought in at home documented blood pressure readings.  Readings have been scanned into patient's chart.    - losartan (COZAAR) 100 MG Tab; Take 1 Tab by mouth every day.  Dispense: 90 Tab; Refill: 3  - CBC WITH DIFFERENTIAL; Future  - Comp Metabolic Panel; Future  - Lipid Profile; Future    2. Insomnia due to medical condition  3. Current moderate episode of major depressive disorder without prior episode (HCC)  Patient has been prescribed Remeron 50 mg tab advised take a half a tablet " by mouth once nightly for sleep deprivation and depression symptoms.  Vies patient after 1 week if sleep deprivation symptoms have not improved to increase to full tablet by mouth once nightly.  Patient read plan.  Discussed common side effects and adverse reaction of medication with patient.  Advised patient she may experience unpleasant side effects for 2+ weeks but side effect symptoms should subside and she should start feel the benefits of the medication around weeks 4-6.  Patient agreed to plan.  Advised patient continue work on diet, exercise, sleep hygiene, hydration, and developing healthy coping mechanisms for stress anxiety.    Denies any suicidal or homicidal ideation. Discussed that should the patient have any symptoms they should call suicide prevention hotline or report to the emergency room immediately.      - mirtazapine (REMERON) 15 MG Tab; Take 1 Tab by mouth every bedtime.  Dispense: 90 Tab; Refill: 0  - Patient has been identified as having a positive depression screening. Appropriate orders and counseling have been given.  - TSH WITH REFLEX TO FT4; Future    4. Vitamin D deficiency  Chronic problem.  Stable.  Could improve.  Patient's vitamin D is on the lower end of normal.  Continue over-the-counter vitamin D supplementation.  Patient will follow up in 1 month to discuss lab work results.    - VITAMIN D,25 HYDROXY; Future    5. Elevated LDL cholesterol level  Chronic problem.  Stable.  Patient does not want to be placed on a statin medication.  Patient's 10-year ASCVD risk score is medium risk and 10-year CVD risk score is high risk.  Lab work has been ordered.  Patient follow-up in 1 month to discuss lab work results.  Continue work on diet and exercise.    - Lipid Profile; Future      Please note that this dictation was created using voice recognition software. I have made every reasonable attempt to correct obvious errors, but I expect that there are errors of grammar and possibly content  that I did not discover before finalizing the note.    Assessment/Plan:  1. Essential hypertension  losartan (COZAAR) 100 MG Tab    CBC WITH DIFFERENTIAL    Comp Metabolic Panel    Lipid Profile   2. Insomnia due to medical condition  mirtazapine (REMERON) 15 MG Tab    TSH WITH REFLEX TO FT4   3. Current moderate episode of major depressive disorder without prior episode (HCC)  mirtazapine (REMERON) 15 MG Tab    Patient has been identified as having a positive depression screening. Appropriate orders and counseling have been given.    TSH WITH REFLEX TO FT4   4. Vitamin D deficiency  VITAMIN D,25 HYDROXY   5. Elevated LDL cholesterol level  Lipid Profile       Return in about 1 month (around 1/11/2021).

## 2021-01-06 ENCOUNTER — HOSPITAL ENCOUNTER (OUTPATIENT)
Dept: LAB | Facility: MEDICAL CENTER | Age: 76
End: 2021-01-06
Attending: PHYSICIAN ASSISTANT
Payer: MEDICARE

## 2021-01-06 DIAGNOSIS — E78.00 ELEVATED LDL CHOLESTEROL LEVEL: ICD-10-CM

## 2021-01-06 DIAGNOSIS — E55.9 VITAMIN D DEFICIENCY: ICD-10-CM

## 2021-01-06 DIAGNOSIS — I10 ESSENTIAL HYPERTENSION: ICD-10-CM

## 2021-01-06 DIAGNOSIS — G47.01 INSOMNIA DUE TO MEDICAL CONDITION: ICD-10-CM

## 2021-01-06 DIAGNOSIS — F32.1 CURRENT MODERATE EPISODE OF MAJOR DEPRESSIVE DISORDER WITHOUT PRIOR EPISODE (HCC): ICD-10-CM

## 2021-01-06 LAB
25(OH)D3 SERPL-MCNC: 47 NG/ML (ref 30–100)
ALBUMIN SERPL BCP-MCNC: 4 G/DL (ref 3.2–4.9)
ALBUMIN/GLOB SERPL: 1.2 G/DL
ALP SERPL-CCNC: 94 U/L (ref 30–99)
ALT SERPL-CCNC: 21 U/L (ref 2–50)
ANION GAP SERPL CALC-SCNC: 8 MMOL/L (ref 7–16)
AST SERPL-CCNC: 28 U/L (ref 12–45)
BASOPHILS # BLD AUTO: 0.8 % (ref 0–1.8)
BASOPHILS # BLD: 0.03 K/UL (ref 0–0.12)
BILIRUB SERPL-MCNC: 0.6 MG/DL (ref 0.1–1.5)
BUN SERPL-MCNC: 13 MG/DL (ref 8–22)
CALCIUM SERPL-MCNC: 9.7 MG/DL (ref 8.5–10.5)
CHLORIDE SERPL-SCNC: 105 MMOL/L (ref 96–112)
CHOLEST SERPL-MCNC: 197 MG/DL (ref 100–199)
CO2 SERPL-SCNC: 29 MMOL/L (ref 20–33)
CREAT SERPL-MCNC: 0.79 MG/DL (ref 0.5–1.4)
EOSINOPHIL # BLD AUTO: 0.16 K/UL (ref 0–0.51)
EOSINOPHIL NFR BLD: 4.4 % (ref 0–6.9)
ERYTHROCYTE [DISTWIDTH] IN BLOOD BY AUTOMATED COUNT: 45.1 FL (ref 35.9–50)
FASTING STATUS PATIENT QL REPORTED: NORMAL
GLOBULIN SER CALC-MCNC: 3.4 G/DL (ref 1.9–3.5)
GLUCOSE SERPL-MCNC: 85 MG/DL (ref 65–99)
HCT VFR BLD AUTO: 43.5 % (ref 37–47)
HDLC SERPL-MCNC: 55 MG/DL
HGB BLD-MCNC: 14.2 G/DL (ref 12–16)
IMM GRANULOCYTES # BLD AUTO: 0.01 K/UL (ref 0–0.11)
IMM GRANULOCYTES NFR BLD AUTO: 0.3 % (ref 0–0.9)
LDLC SERPL CALC-MCNC: 125 MG/DL
LYMPHOCYTES # BLD AUTO: 1.64 K/UL (ref 1–4.8)
LYMPHOCYTES NFR BLD: 45.3 % (ref 22–41)
MCH RBC QN AUTO: 30.7 PG (ref 27–33)
MCHC RBC AUTO-ENTMCNC: 32.6 G/DL (ref 33.6–35)
MCV RBC AUTO: 94.2 FL (ref 81.4–97.8)
MONOCYTES # BLD AUTO: 0.35 K/UL (ref 0–0.85)
MONOCYTES NFR BLD AUTO: 9.7 % (ref 0–13.4)
NEUTROPHILS # BLD AUTO: 1.43 K/UL (ref 2–7.15)
NEUTROPHILS NFR BLD: 39.5 % (ref 44–72)
NRBC # BLD AUTO: 0 K/UL
NRBC BLD-RTO: 0 /100 WBC
PLATELET # BLD AUTO: 229 K/UL (ref 164–446)
PMV BLD AUTO: 10.7 FL (ref 9–12.9)
POTASSIUM SERPL-SCNC: 4.5 MMOL/L (ref 3.6–5.5)
PROT SERPL-MCNC: 7.4 G/DL (ref 6–8.2)
RBC # BLD AUTO: 4.62 M/UL (ref 4.2–5.4)
SODIUM SERPL-SCNC: 142 MMOL/L (ref 135–145)
TRIGL SERPL-MCNC: 86 MG/DL (ref 0–149)
TSH SERPL DL<=0.005 MIU/L-ACNC: 3.34 UIU/ML (ref 0.38–5.33)
WBC # BLD AUTO: 3.6 K/UL (ref 4.8–10.8)

## 2021-01-06 PROCEDURE — 84443 ASSAY THYROID STIM HORMONE: CPT

## 2021-01-06 PROCEDURE — 80053 COMPREHEN METABOLIC PANEL: CPT

## 2021-01-06 PROCEDURE — 85025 COMPLETE CBC W/AUTO DIFF WBC: CPT

## 2021-01-06 PROCEDURE — 36415 COLL VENOUS BLD VENIPUNCTURE: CPT

## 2021-01-06 PROCEDURE — 82306 VITAMIN D 25 HYDROXY: CPT

## 2021-01-06 PROCEDURE — 80061 LIPID PANEL: CPT

## 2021-01-07 ENCOUNTER — OFFICE VISIT (OUTPATIENT)
Dept: MEDICAL GROUP | Facility: PHYSICIAN GROUP | Age: 76
End: 2021-01-07
Payer: MEDICARE

## 2021-01-07 VITALS
SYSTOLIC BLOOD PRESSURE: 125 MMHG | WEIGHT: 174 LBS | OXYGEN SATURATION: 96 % | HEART RATE: 73 BPM | TEMPERATURE: 97.4 F | BODY MASS INDEX: 27.97 KG/M2 | HEIGHT: 66 IN | RESPIRATION RATE: 15 BRPM | DIASTOLIC BLOOD PRESSURE: 79 MMHG

## 2021-01-07 DIAGNOSIS — I10 ESSENTIAL HYPERTENSION: ICD-10-CM

## 2021-01-07 DIAGNOSIS — Z83.49 FAMILY HISTORY OF B12 DEFICIENCY: ICD-10-CM

## 2021-01-07 DIAGNOSIS — R79.89 ABNORMAL CBC: ICD-10-CM

## 2021-01-07 DIAGNOSIS — E78.00 ELEVATED LDL CHOLESTEROL LEVEL: ICD-10-CM

## 2021-01-07 DIAGNOSIS — G47.01 INSOMNIA DUE TO MEDICAL CONDITION: ICD-10-CM

## 2021-01-07 DIAGNOSIS — D72.819 LEUKOPENIA, UNSPECIFIED TYPE: ICD-10-CM

## 2021-01-07 DIAGNOSIS — E55.9 VITAMIN D DEFICIENCY: ICD-10-CM

## 2021-01-07 PROCEDURE — 99214 OFFICE O/P EST MOD 30 MIN: CPT | Performed by: PHYSICIAN ASSISTANT

## 2021-01-07 RX ORDER — TRAZODONE HYDROCHLORIDE 50 MG/1
50 TABLET ORAL
Qty: 90 TAB | Refills: 1 | Status: SHIPPED | OUTPATIENT
Start: 2021-01-07 | End: 2021-02-09

## 2021-01-07 RX ORDER — ATORVASTATIN CALCIUM 20 MG/1
20 TABLET, FILM COATED ORAL EVERY EVENING
Qty: 90 TAB | Refills: 3 | Status: SHIPPED | OUTPATIENT
Start: 2021-01-07 | End: 2021-12-07

## 2021-01-07 RX ORDER — TRAZODONE HYDROCHLORIDE 50 MG/1
50 TABLET ORAL
Qty: 90 TAB | Refills: 1 | Status: SHIPPED | OUTPATIENT
Start: 2021-01-07 | End: 2021-01-07 | Stop reason: SDUPTHER

## 2021-01-07 ASSESSMENT — FIBROSIS 4 INDEX: FIB4 SCORE: 1.95

## 2021-01-07 ASSESSMENT — PATIENT HEALTH QUESTIONNAIRE - PHQ9: CLINICAL INTERPRETATION OF PHQ2 SCORE: 0

## 2021-01-07 NOTE — PROGRESS NOTES
Chief Complaint   Patient presents with   • Hypertension Follow-up       HISTORY OF PRESENT ILLNESS: Rosenda Cowan is an established 75 y.o. female here to discuss the evaluation and management of:    Essential hypertension  Chronic but stable problem.  Patient's blood pressure during today's appointment 125/79 mmHg.  She is taking losartan 100 mg tab once daily.  Denies side effects or complications of medication.  Patient has been monitoring blood pressure at home and has brought in at home document blood pressure readings.  Readings have been scanned into patient's chart.  Blood pressure increases when she is in pain.  Patient suffers from chronic post herpatic neuralgia.  She denies chest pain, shortness of breath, heart palpitations, dizziness, syncope, severe headache, vision changes, peripheral edema.    Insomnia due to medical condition  Very last appointment on 12/11/2020 patient was prescribed rizatriptan 50 mg tab once nightly advised to take half a tablet by mouth once daily and titrate up to a full tablet by mouth once nightly if warranted.  She tells me that she took medication from 12/11/2020 through 12/28/2020 but only took a half a tablet.  States medication made her feel foggy and more depressed.  She admits that she did sleep 7 straight hours when taking medication.  Without medication she sleeps 4-5 hours per night.  Patient is open to trying a different medication.    Vitamin D deficiency  Discussed recent lab work results with patient.  Vitamin D level is within normal limits and trending upward.  Patient is taking over-the-counter 1000 units vitamin D once daily.  Denies side effects or complications from supplementation.    Elevated LDL cholesterol level  Reviewed patient's lipid profile lab work results from 11/6/2021.  Results are as follows:    Cholesterol,Tot 100 - 199 mg/dL 197    Triglycerides 0 - 149 mg/dL 86    HDL >=40 mg/dL 55    LDL <100 mg/dL 125High       Patient is agreed  to start a statin medication.  Patient's 10-year ASCVD risk score was high risk and 10-year CVD risk or put patient in a medium risk category.  Discussed with patient.    Family history of B12 deficiency  Abnormal CBC  Leukopenia, unspecified type  Discussed recent CBC results with patient.  White blood cell count slightly low and consistent to past results.  Patient repeat lab work in the near future for reevaluation.  Patient is asymptomatic.  Will evaluate patient for B12 deficiency.          Patient Active Problem List    Diagnosis Date Noted   • Elevated LDL cholesterol level 2020   • Vitamin D deficiency 10/01/2019   • Post herpetic neuralgia 2019   • Essential hypertension 2019   • Rectocele 2019   • Cystocele with uterine prolapse 2017       Allergies:Patient has no known allergies.    Current Outpatient Medications   Medication Sig Dispense Refill   • traZODone (DESYREL) 50 MG Tab Take 1 Tab by mouth every bedtime. 90 Tab 1   • atorvastatin (LIPITOR) 20 MG Tab Take 1 Tab by mouth every evening. 90 Tab 3   • losartan (COZAAR) 100 MG Tab Take 1 Tab by mouth every day. 90 Tab 3   • estradiol (ESTRACE) 0.1 MG/GM vaginal cream Insert 0.5 g in vagina Every  and Wednesday. 42.5 g 5   • Calcium Carb-Cholecalciferol (CALCIUM 600+D3) 600-800 MG-UNIT Tab Take  by mouth.     • Multiple Vitamins-Minerals (MULTIPLE VITAMINS/WOMENS) Tab Take  by mouth.     • LYRICA 150 MG Cap Take 1 Cap by mouth 3 times a day.     • oxycodone-acetaminophen (PERCOCET) 5-325 MG Tab Take 1-2 Tabs by mouth every four hours as needed.       No current facility-administered medications for this visit.        Social History     Tobacco Use   • Smoking status: Never Smoker   • Smokeless tobacco: Never Used   Substance Use Topics   • Alcohol use: Yes     Comment: 3 - 4 dirnks per month.    • Drug use: No       Family Status   Relation Name Status   • Mo     • Fa     • Sis  Alive   • Sis  Alive  "  • Sis  Alive   • Bridger  Alive   • Bridger  Alive     Family History   Problem Relation Age of Onset   • Hypertension Mother    • Heart Disease Father    • Hypertension Father    • Colon Cancer Father    • Other Sister         Intellectual Disability    • No Known Problems Daughter    • No Known Problems Daughter        ROS:  Review of Systems   Constitutional: Negative for fever, chills, weight loss and malaise/fatigue.   HENT: Negative for ear pain, nosebleeds, congestion, sore throat and neck pain.    Eyes: Negative for blurred vision.   Respiratory: Negative for cough, sputum production, shortness of breath and wheezing.    Cardiovascular: Negative for chest pain, palpitations, orthopnea and leg swelling.   Gastrointestinal: Negative for heartburn, nausea, vomiting and abdominal pain.   Genitourinary: Negative for dysuria, urgency and frequency.   Musculoskeletal: Negative for myalgias, back pain and joint pain.   Skin: Negative for rash and itching.   Neurological: Negative for dizziness, tingling, tremors, sensory change, focal weakness and headaches.   Endo/Heme/Allergies: Does not bruise/bleed easily.   Psychiatric/Behavioral: Negative for depression, suicidal ideas and memory loss.  The patient is not nervous/anxious and does not have insomnia.    All other systems reviewed and are negative except as in HPI.    Exam: /79 (BP Location: Left arm, Patient Position: Sitting, BP Cuff Size: Adult)   Pulse 73   Temp 36.3 °C (97.4 °F) (Temporal)   Resp 15   Ht 1.676 m (5' 6\")   Wt 78.9 kg (174 lb)   SpO2 96%  Body mass index is 28.08 kg/m².  General: Normal appearing. No distress.  HEENT: Normocephalic. Eyes conjunctiva clear lids without ptosis, ears normal shape and contour.  Neck: Supple without JVD. Thyroid is not enlarged.  Pulmonary: Clear to ausculation.  Normal effort. No rales, ronchi, or wheezing.  Cardiovascular: Regular rate and rhythm without murmur.   Abdomen: Nondistended.   Neurologic: " Grossly nonfocal.  Cranial nerves are normal.   Skin: Warm and dry.  No rashes or suspicious skin lesions.  Musculoskeletal: Normal gait. No extremity cyanosis, clubbing, or edema.  Psych: Normal mood and affect. Alert and oriented x3. Judgment and insight is normal.    Medical decision-making and discussion:  1. Essential hypertension  Well-controlled. Labs as indicated. Continue antihypertensive medications. Discussed decreasing salt intake. Emphasized benefits of exercise and diet. Continue to monitor.      2. Insomnia due to medical condition  Trazodone 50 mg tab once daily has been prescribed.  Advised patient to take a quarter of a tablet by mouth 30-60 minutes prior to bedtime.  If this dosage does not alleviate sleep deprivation symptoms she can increase it to half a tablet but do not exceed more than 50 mg once nightly.  Patient agreed to plan.  Continue work on diet, exercise, sleep hygiene and developing healthy coping mechanisms for stress anxiety.  Continue to monitor.    - traZODone (DESYREL) 50 MG Tab; Take 1 Tab by mouth every bedtime.  Dispense: 90 Tab; Refill: 1    3. Vitamin D deficiency  Chronic but stable problem.  Discussed recent vitamin D lab work results with patient.  Vitamin D is trending upward.  Continue vitamin D supplementation.  Continue to monitor.    4. Elevated LDL cholesterol level  Vinicius recent lipid profile lab results with patient.  Patient's 10-year ASCVD risk score is high and 10-year CVD risk score is medium risk.  Patient has agreed to Lipitor 20 mg tablets daily.  Discussed with patient the common side effect is myalgias.  Patient does not want to start co-Q10 2-3 weeks prior to starting Lipitor.  Advised patient if she develops myalgias when taking Lipitor she can discontinue medication and take over-the-counter co-Q10 once daily for 2-3 weeks and then restart Lipitor but continue taking coq.10.  Patient agreed to plan.  Advised patient to continue working on diet and  exercise.    Follow-up in 4 weeks for evaluation.    - atorvastatin (LIPITOR) 20 MG Tab; Take 1 Tab by mouth every evening.  Dispense: 90 Tab; Refill: 3    5. Family history of B12 deficiency    - VITAMIN B12; Future    6. Abnormal CBC  Patient has a family history of pernicious anemia.  Will check patient's B12 level.  Patient follow-up in 4 weeks discuss lab work results.    - VITAMIN B12; Future    7. Leukopenia, unspecified type  Chronic but stable problem.  Will continue to monitor closely.  Patient repeat lab work in the near future.  Patient agreed to plan.      Please note that this dictation was created using voice recognition software. I have made every reasonable attempt to correct obvious errors, but I expect that there are errors of grammar and possibly content that I did not discover before finalizing the note.    Assessment/Plan:  1. Essential hypertension     2. Insomnia due to medical condition  traZODone (DESYREL) 50 MG Tab    DISCONTINUED: traZODone (DESYREL) 50 MG Tab   3. Vitamin D deficiency     4. Elevated LDL cholesterol level  atorvastatin (LIPITOR) 20 MG Tab   5. Family history of B12 deficiency  VITAMIN B12   6. Abnormal CBC  VITAMIN B12       Return in about 1 month (around 2/7/2021).

## 2021-01-15 DIAGNOSIS — Z23 NEED FOR VACCINATION: ICD-10-CM

## 2021-01-22 ENCOUNTER — HOSPITAL ENCOUNTER (OUTPATIENT)
Dept: LAB | Facility: MEDICAL CENTER | Age: 76
End: 2021-01-22
Attending: PHYSICIAN ASSISTANT
Payer: MEDICARE

## 2021-01-22 DIAGNOSIS — Z83.49 FAMILY HISTORY OF B12 DEFICIENCY: ICD-10-CM

## 2021-01-22 DIAGNOSIS — R79.89 ABNORMAL CBC: ICD-10-CM

## 2021-01-22 LAB — VIT B12 SERPL-MCNC: 482 PG/ML (ref 211–911)

## 2021-01-22 PROCEDURE — 82607 VITAMIN B-12: CPT

## 2021-01-22 PROCEDURE — 36415 COLL VENOUS BLD VENIPUNCTURE: CPT

## 2021-02-03 ENCOUNTER — TELEPHONE (OUTPATIENT)
Dept: MEDICAL GROUP | Facility: PHYSICIAN GROUP | Age: 76
End: 2021-02-03

## 2021-02-03 NOTE — TELEPHONE ENCOUNTER
ESTABLISHED PATIENT PRE-VISIT PLANNING     Patient was NOT contacted to complete PVP.     Note: Patient will not be contacted if there is no indication to call.     1.  Reviewed notes from the last few office visits within the medical group: Yes    2.  If any orders were placed at last visit or intended to be done for this visit (i.e. 6 mos follow-up), do we have Results/Consult Notes?         •  Labs - Labs ordered, completed on 01/22/2021 and results are in chart.  Note: If patient appointment is for lab review and patient did not complete labs, check with provider if OK to reschedule patient until labs completed.       •  Imaging - Imaging was not ordered at last office visit.       •  Referrals - No referrals were ordered at last office visit.    3. Is this appointment scheduled as a Hospital Follow-Up? No    4.  Immunizations were updated in Epic using Reconcile Outside Information activity? No    5.  Patient is due for the following Health Maintenance Topics:   Health Maintenance Due   Topic Date Due   • HEPATITIS C SCREENING  1945   • COVID-19 Vaccine (1 of 2) 11/25/1961   • BONE DENSITY  11/25/2012   • IMM ZOSTER VACCINES (2 of 2) 10/11/2020       - Patient plans to schedule appointment for Dexa Scan and Immunizations: SHINGRIX (Shingles).    6.  AHA (Pulse8) form printed for Provider? N/A

## 2021-02-09 ENCOUNTER — OFFICE VISIT (OUTPATIENT)
Dept: MEDICAL GROUP | Facility: PHYSICIAN GROUP | Age: 76
End: 2021-02-09
Payer: MEDICARE

## 2021-02-09 VITALS
SYSTOLIC BLOOD PRESSURE: 126 MMHG | OXYGEN SATURATION: 95 % | WEIGHT: 175.6 LBS | RESPIRATION RATE: 12 BRPM | BODY MASS INDEX: 28.22 KG/M2 | DIASTOLIC BLOOD PRESSURE: 70 MMHG | HEART RATE: 79 BPM | TEMPERATURE: 97.4 F | HEIGHT: 66 IN

## 2021-02-09 DIAGNOSIS — E78.00 ELEVATED LDL CHOLESTEROL LEVEL: ICD-10-CM

## 2021-02-09 DIAGNOSIS — I10 ESSENTIAL HYPERTENSION: ICD-10-CM

## 2021-02-09 DIAGNOSIS — R07.9 CHEST PAIN, UNSPECIFIED TYPE: ICD-10-CM

## 2021-02-09 DIAGNOSIS — R42 DIZZINESS: ICD-10-CM

## 2021-02-09 PROCEDURE — 99214 OFFICE O/P EST MOD 30 MIN: CPT | Performed by: PHYSICIAN ASSISTANT

## 2021-02-09 ASSESSMENT — FIBROSIS 4 INDEX: FIB4 SCORE: 1.95

## 2021-02-09 NOTE — PROGRESS NOTES
Chief Complaint   Patient presents with   • Follow-Up     HTN        HISTORY OF PRESENT ILLNESS: Rosenda Cowan is an established 75 y.o. female here to discuss the evaluation and management of:    She is a pleasant 75-year-old female here today as hypertension and would like discuss intermittent chest pain.  Patient brought in at home document blood pressure readings in chart.  Patient blood pressure during today's appointment 126/70 mmHg.  She tells me that I did take her blood pressure monitor with her to [facility] and have it calibrated.  She is unsure if her blood pressure monitor is reading correctly.  She is taking losartan 100 mg tablets daily.  Denies side effects or complications from medication.  Denies shortness of breath, heart palpitations, syncope, severe headache, vision changes, peripheral edema.    Patient tells me for the past 3 months 2-3 times a week to experiencing a tightness in the center of her chest chest between her upper breast over her sternum.  States from 20-30 minutes in duration and pain is nonradiating.  States area of concern can be tender to palpation.  Pain symptoms can happen at rest or with activity.  Since the last time she had chest pain symptoms she experienced dizziness for a few seconds.  Did not experience syncope.  Patient does have a history of high blood pressure and elevated.  She denies acid reflux symptoms.  Denies shortness of breath, heart palpitations, syncope, severe headache, vision changes, peripheral edema, orthopnea, chronic cough, hoarseness of voice, pain or difficulty with swallowing, nausea, vomiting, jaw claudication, neurological deficits.    Patient is not having active pain during today's appointment.  Declined in office EKG.      Patient Active Problem List    Diagnosis Date Noted   • Elevated LDL cholesterol level 01/07/2020   • Vitamin D deficiency 10/01/2019   • Post herpetic neuralgia 06/14/2019   •  Essential hypertension 2019   • Rectocele 2019   • Cystocele with uterine prolapse 2017       Allergies:Patient has no known allergies.    Current Outpatient Medications   Medication Sig Dispense Refill   • atorvastatin (LIPITOR) 20 MG Tab Take 1 Tab by mouth every evening. 90 Tab 3   • losartan (COZAAR) 100 MG Tab Take 1 Tab by mouth every day. 90 Tab 3   • estradiol (ESTRACE) 0.1 MG/GM vaginal cream Insert 0.5 g in vagina Every  and Wednesday. 42.5 g 5   • Calcium Carb-Cholecalciferol (CALCIUM 600+D3) 600-800 MG-UNIT Tab Take  by mouth.     • Multiple Vitamins-Minerals (MULTIPLE VITAMINS/WOMENS) Tab Take  by mouth.     • LYRICA 150 MG Cap Take 1 Cap by mouth 3 times a day.     • oxycodone-acetaminophen (PERCOCET) 5-325 MG Tab Take 1-2 Tabs by mouth every four hours as needed.       No current facility-administered medications for this visit.        Social History     Tobacco Use   • Smoking status: Never Smoker   • Smokeless tobacco: Never Used   Substance Use Topics   • Alcohol use: Yes     Comment: 3 - 4 dirnks per month.    • Drug use: No       Family Status   Relation Name Status   • Mo     • Fa     • Sis  Alive   • Sis  Alive   • Sis  Alive   • Bridger  Alive   • Bridger  Alive     Family History   Problem Relation Age of Onset   • Hypertension Mother    • Heart Disease Father    • Hypertension Father    • Colon Cancer Father    • Other Sister         Intellectual Disability    • No Known Problems Daughter    • No Known Problems Daughter        ROS:  Review of Systems   Constitutional: Negative for fever, chills, weight loss and malaise/fatigue.   HENT: Negative for ear pain, nosebleeds, congestion, sore throat and neck pain.    Eyes: Negative for blurred vision.   Respiratory: Negative for cough, sputum production, shortness of breath and wheezing.    Cardiovascular: Negative for chest pain, palpitations, orthopnea and leg swelling.   Gastrointestinal: Negative for heartburn,  "nausea, vomiting and abdominal pain.   Genitourinary: Negative for dysuria, urgency and frequency.   Musculoskeletal: Negative for myalgias, back pain and joint pain.   Skin: Negative for rash and itching.   Neurological: Negative for dizziness, tingling, tremors, sensory change, focal weakness and headaches.   Endo/Heme/Allergies: Does not bruise/bleed easily.   Psychiatric/Behavioral: Negative for depression, suicidal ideas and memory loss.  The patient is not nervous/anxious and does not have insomnia.    All other systems reviewed and are negative except as in HPI.    Exam: /70 (BP Location: Left arm, Patient Position: Sitting, BP Cuff Size: Adult)   Pulse 79   Temp 36.3 °C (97.4 °F) (Temporal)   Resp 12   Ht 1.676 m (5' 6\")   Wt 79.7 kg (175 lb 9.6 oz)   SpO2 95%  Body mass index is 28.34 kg/m².  General: Normal appearing. No distress.  HEENT: Normocephalic. Eyes conjunctiva clear lids without ptosis, ears normal shape and contour.  Neck: Supple without JVD. Thyroid is not enlarged.  Pulmonary: Clear to ausculation.  Normal effort. No rales, ronchi, or wheezing.  Cardiovascular: Regular rate and rhythm without murmur.   Abdomen: Soft, nontender, nondistended.  Neurologic: Grossly nonfocal.  Cranial nerves are normal.   Skin: Warm and dry.  No rashes or suspicious skin lesions.  Musculoskeletal: Normal gait. No extremity cyanosis, clubbing, or edema.  Anterior chest wall is nontender to palpation.  Over sternum is nontender to palpation.  No signs of trauma.  Psych: Normal mood and affect. Alert and oriented x3. Judgment and insight is normal.    Medical decision-making and discussion:  1. Essential hypertension  Well-controlled. Labs as indicated. Continue antihypertensive medications. Discussed decreasing salt intake. Emphasized benefits of exercise and diet. Continue to monitor.  Advisd patient to continue monitoring blood home.  Discussed with patient at home blood pressure monitoring may not be " reading accurately.  Agreed that monitor should be evaluated.  Continue working on diet, exercise, sleep hygiene, hydration, and developing healthy coping mechanisms for stress anxiety.  Decrease in sodium consumption.    - NM-CARDIAC STRESS TEST; Future    2. Chest pain, unspecified type  3. Dizziness  Cardiac stress test has been ordered.  Patient will be contacted with results.  Discussed ED precautions with patient.  Continue anti as prescribed.  Continue staying active and eating a well-balanced diet.  Patient will follow up in 1 month reevaluation.    Patient is not having active pain during today's appointment.  Declined in office EKG.      - NM-CARDIAC STRESS TEST; Future    4. Elevated LDL cholesterol level  Well controlled. Labs as indicated. Continue statin medication. Continue to monitor.  Same as # 2 and 3.    - NM-CARDIAC STRESS TEST; Future      Please note that this dictation was created using voice recognition software. I have made every reasonable attempt to correct obvious errors, but I expect that there are errors of grammar and possibly content that I did not discover before finalizing the note.    Assessment/Plan:  1. Essential hypertension  NM-CARDIAC STRESS TEST   2. Chest pain, unspecified type  NM-CARDIAC STRESS TEST   3. Dizziness  NM-CARDIAC STRESS TEST   4. Elevated LDL cholesterol level  NM-CARDIAC STRESS TEST       Return in about 1 month (around 3/9/2021).

## 2021-02-17 ENCOUNTER — HOSPITAL ENCOUNTER (OUTPATIENT)
Dept: RADIOLOGY | Facility: MEDICAL CENTER | Age: 76
End: 2021-02-17
Attending: PHYSICIAN ASSISTANT
Payer: MEDICARE

## 2021-02-17 DIAGNOSIS — I10 ESSENTIAL HYPERTENSION: ICD-10-CM

## 2021-02-17 DIAGNOSIS — E78.00 ELEVATED LDL CHOLESTEROL LEVEL: ICD-10-CM

## 2021-02-17 DIAGNOSIS — R07.9 CHEST PAIN, UNSPECIFIED TYPE: ICD-10-CM

## 2021-02-17 DIAGNOSIS — R42 DIZZINESS: ICD-10-CM

## 2021-02-17 PROCEDURE — A9502 TC99M TETROFOSMIN: HCPCS

## 2021-02-17 PROCEDURE — 78452 HT MUSCLE IMAGE SPECT MULT: CPT | Mod: 26 | Performed by: INTERNAL MEDICINE

## 2021-02-17 PROCEDURE — 93018 CV STRESS TEST I&R ONLY: CPT | Performed by: INTERNAL MEDICINE

## 2021-02-17 NOTE — PROCEDURES
Patient presents to NM suite for cardiac stress test with MPI. Nursing goals identified: knowledge deficit, potential for anxiety r/t stress test, potential for compromised cardiac output. Care plan includes educating patient, reassurance and access to ACLS cart/team. Labs and ECG reviewed. No caffeine and NPO confirmed. Resting images attained and patient prepped for treadmill stress study. Total exercise time: 6:09, Max HR: 142, METs: 7.1  Patient reported these symptoms: 3/10 chest tightness resolving during rest, 7 beat Vtach asymptomatic, PAC's.  Dr Pineda notified via Voalte text. Water beverage provided. Symptoms resolved.

## 2021-02-23 ENCOUNTER — GYNECOLOGY VISIT (OUTPATIENT)
Dept: OBGYN | Facility: CLINIC | Age: 76
End: 2021-02-23
Payer: MEDICARE

## 2021-02-23 VITALS — DIASTOLIC BLOOD PRESSURE: 79 MMHG | SYSTOLIC BLOOD PRESSURE: 158 MMHG | WEIGHT: 173 LBS | BODY MASS INDEX: 27.92 KG/M2

## 2021-02-23 DIAGNOSIS — Z46.89 PESSARY MAINTENANCE: ICD-10-CM

## 2021-02-23 PROCEDURE — 99213 OFFICE O/P EST LOW 20 MIN: CPT | Performed by: OBSTETRICS & GYNECOLOGY

## 2021-02-23 ASSESSMENT — FIBROSIS 4 INDEX: FIB4 SCORE: 1.95

## 2021-02-23 NOTE — NON-PROVIDER
Pt here Pessary check  Pt states she had some spotting on the 20th and lots more that followed yesterday.  Good#  [phone]  Pharmacy verified

## 2021-02-23 NOTE — PROGRESS NOTES
Pessary Insertion:  S: 75 y.o.  here for pessary insertion and cleaning  Pessary type: #3 ring with support    PE:  There were no vitals filed for this visit.  SSE: small areas of excoriations that were made hemostatic by silver nitrate, better estrogenized that last exam     AP:   Cannot go longer than 2 months between cleanings due to vaginal ulcers that form, return in about that time frame   Pt states pessary was comfortable and there was no slipping of the device  Pessary was checked for placement and fit after several minutes of mild activity and noted to be in place.    Pt is to call for any pain, discharge or bleeding from the device.

## 2021-03-09 ENCOUNTER — OFFICE VISIT (OUTPATIENT)
Dept: MEDICAL GROUP | Facility: PHYSICIAN GROUP | Age: 76
End: 2021-03-09
Payer: MEDICARE

## 2021-03-09 VITALS
DIASTOLIC BLOOD PRESSURE: 83 MMHG | HEIGHT: 66 IN | WEIGHT: 177.6 LBS | BODY MASS INDEX: 28.54 KG/M2 | HEART RATE: 74 BPM | OXYGEN SATURATION: 95 % | SYSTOLIC BLOOD PRESSURE: 149 MMHG | RESPIRATION RATE: 20 BRPM | TEMPERATURE: 97.2 F

## 2021-03-09 DIAGNOSIS — R07.89 SENSATION OF CHEST TIGHTNESS: ICD-10-CM

## 2021-03-09 DIAGNOSIS — I10 ESSENTIAL HYPERTENSION: ICD-10-CM

## 2021-03-09 PROCEDURE — 99214 OFFICE O/P EST MOD 30 MIN: CPT | Performed by: PHYSICIAN ASSISTANT

## 2021-03-09 RX ORDER — HYDROCHLOROTHIAZIDE 12.5 MG/1
12.5 TABLET ORAL DAILY
Qty: 90 TABLET | Refills: 1 | Status: SHIPPED | OUTPATIENT
Start: 2021-03-09 | End: 2021-09-16

## 2021-03-09 ASSESSMENT — FIBROSIS 4 INDEX: FIB4 SCORE: 1.95

## 2021-03-09 NOTE — PROGRESS NOTES
Chief Complaint   Patient presents with   • Follow-Up     HTN, not any better        HISTORY OF PRESENT ILLNESS: Rosenda Cowan is an established 75 y.o. female here to discuss the evaluation and management of:    Essential hypertension  Sensation of chest tightness  Chronic problem.  Could be better controlled.  Patient's blood pressure during today's appointment 130/70 mmHg.  Patient brought in at home blood pressure monitor and monitor read blood pressure is 149/83 mmHg.  She also brought in at home blood pressure readings.  At home readings indicated patient's blood pressure fluctuates.  Patient suffers from chronic pain symptoms.  She feels blood pressure fluctuation could be due to chronic pain.  Currently takes losartan 100 mg tab once daily.  Denies side effects or complications of medication.  Denies dry cough.  Denies heart palpitations, dizziness, syncope, severe headache, vision changes, peripheral edema.  She admits to episodes of chest tightness located over the center.  Pain is a nonradiating pain and nontender to palpation.  We discussed this during her last appointment on 2/09/2021.  Cardiac stress test was ordered.  Stress test indicated no abnormal findings.  Patient states since her last appointment chest tightness symptoms have improved.  On average experiences 1 episode per week and sensation can last anywhere from 20-30 minutes in duration.  She is unsure if it stress related or food related.  Denies heartburn.  Denies chronic cough, hoarseness of voice, pain or difficulty with swallowing, nausea, vomiting, abdominal pain, jaw claudication, neurological deficits.      Patient Active Problem List    Diagnosis Date Noted   • Elevated LDL cholesterol level 01/07/2020   • Vitamin D deficiency 10/01/2019   • Post herpetic neuralgia 06/14/2019   • Essential hypertension 06/14/2019   • Rectocele 06/14/2019   • Cystocele with uterine prolapse 12/28/2017       Allergies:Patient has no known  allergies.    Current Outpatient Medications   Medication Sig Dispense Refill   • hydroCHLOROthiazide (HYDRODIURIL) 12.5 MG tablet Take 1 tablet by mouth every day. 90 tablet 1   • atorvastatin (LIPITOR) 20 MG Tab Take 1 Tab by mouth every evening. 90 Tab 3   • losartan (COZAAR) 100 MG Tab Take 1 Tab by mouth every day. 90 Tab 3   • estradiol (ESTRACE) 0.1 MG/GM vaginal cream Insert 0.5 g in vagina Every  and Wednesday. 42.5 g 5   • Calcium Carb-Cholecalciferol (CALCIUM 600+D3) 600-800 MG-UNIT Tab Take  by mouth.     • Multiple Vitamins-Minerals (MULTIPLE VITAMINS/WOMENS) Tab Take  by mouth.     • LYRICA 150 MG Cap Take 1 Cap by mouth 3 times a day.     • oxycodone-acetaminophen (PERCOCET) 5-325 MG Tab Take 1-2 Tabs by mouth every four hours as needed.       No current facility-administered medications for this visit.       Social History     Tobacco Use   • Smoking status: Never Smoker   • Smokeless tobacco: Never Used   Substance Use Topics   • Alcohol use: Yes     Comment: 3 - 4 dirnks per month.    • Drug use: No       Family Status   Relation Name Status   • Mo     • Fa     • Sis  Alive   • Sis  Alive   • Sis  Alive   • Bridger  Alive   • Bridger  Alive     Family History   Problem Relation Age of Onset   • Hypertension Mother    • Heart Disease Father    • Hypertension Father    • Colon Cancer Father    • Other Sister         Intellectual Disability    • No Known Problems Daughter    • No Known Problems Daughter        ROS:  Review of Systems   Constitutional: Negative for fever, chills, weight loss and malaise/fatigue.   HENT: Negative for ear pain, nosebleeds, congestion, sore throat and neck pain.    Eyes: Negative for blurred vision.   Respiratory: Negative for cough, sputum production, shortness of breath and wheezing.    Cardiovascular: Negative for chest pain, palpitations, orthopnea and leg swelling.  Positive for chest tightness.  Gastrointestinal: Negative for heartburn, nausea,  "vomiting and abdominal pain.   Genitourinary: Negative for dysuria, urgency and frequency.   Musculoskeletal: Negative for myalgias, back pain and joint pain.   Skin: Negative for rash and itching.   Neurological: Negative for dizziness, tingling, tremors, sensory change, focal weakness and headaches.   Endo/Heme/Allergies: Does not bruise/bleed easily.   Psychiatric/Behavioral: Negative for depression, suicidal ideas and memory loss.  The patient is not nervous/anxious and does not have insomnia.    All other systems reviewed and are negative except as in HPI.    Exam: /83 (BP Location: Left arm, Patient Position: Sitting, BP Cuff Size: Adult)   Pulse 74   Temp 36.2 °C (97.2 °F) (Temporal)   Resp 20   Ht 1.676 m (5' 6\")   Wt 80.6 kg (177 lb 9.6 oz)   SpO2 95%  Body mass index is 28.67 kg/m².  General: Normal appearing. No distress.  HEENT: Normocephalic. Eyes conjunctiva clear lids without ptosis, ears normal shape and contour.  Neck: Supple without JVD. Thyroid is not enlarged.  Pulmonary: Clear to ausculation.  Normal effort. No rales, ronchi, or wheezing.  Cardiovascular: Regular rate and rhythm without murmur.   Abdomen: Nondistended.   Neurologic: Grossly nonfocal.  Cranial nerves are normal.   Skin: Warm and dry.  No rashes or suspicious skin lesions.  Musculoskeletal: Normal gait. No extremity cyanosis, clubbing, or edema.  Psych: Normal mood and affect. Alert and oriented x3. Judgment and insight is normal.    Medical decision-making and discussion:  1. Essential hypertension  Chronic problem.  Could be better controlled.  Patient is at home blood pressure monitor reads and accurately.  Patient has been advised to continue losartan as prescribed.  She has been prescribed hydrochlorothiazide 12.5 mg tab once daily.  Advised patient to take hydrochlorothiazide in the a.m.  Discussed common side effects and adverse reactions of medication with patient.  Continue working on diet, exercise, sleep " hygiene, hydration, and decreasing sodium consumption.  Patient will follow up in 3 weeks for evaluation.  Discussed ED precautions with patient.    - hydroCHLOROthiazide (HYDRODIURIL) 12.5 MG tablet; Take 1 tablet by mouth every day.  Dispense: 90 tablet; Refill: 1    2. Sensation of chest tightness  Discussed cardiac stress test results with patient.  Results were unremarkable.    Advised patient to diary symptoms.  Continue working on diet, exercise, sleep hygiene, and developing healthy coping mechanisms for stress anxiety.  Pt is unsure if she is feeling anxious during times of chest tightness.  Advised patient to use over-the-counter Pepcid once daily.    Ways to help your acid reflux:    · Eat smaller portions.  · Avoid lying down after meals.  · Normalize body weight.  · Avoid common reflux triggers such as spicy, greasy foods, peppers, onions.  · Avoid caffeine, carbonation, alcohol, tobacco.  · Raise the head of your bed.    Follow-up in 3 weeks for reevaluation.        Please note that this dictation was created using voice recognition software. I have made every reasonable attempt to correct obvious errors, but I expect that there are errors of grammar and possibly content that I did not discover before finalizing the note.    Assessment/Plan:  1. Essential hypertension  hydroCHLOROthiazide (HYDRODIURIL) 12.5 MG tablet   2. Sensation of chest tightness         Return in about 3 weeks (around 3/30/2021).

## 2021-03-24 ENCOUNTER — IMMUNIZATION (OUTPATIENT)
Dept: FAMILY PLANNING/WOMEN'S HEALTH CLINIC | Facility: IMMUNIZATION CENTER | Age: 76
End: 2021-03-24
Attending: INTERNAL MEDICINE
Payer: MEDICARE

## 2021-03-24 DIAGNOSIS — Z23 NEED FOR VACCINATION: ICD-10-CM

## 2021-03-24 DIAGNOSIS — Z23 ENCOUNTER FOR VACCINATION: Primary | ICD-10-CM

## 2021-03-24 PROCEDURE — 0001A PFIZER SARS-COV-2 VACCINE: CPT

## 2021-03-24 PROCEDURE — 91300 PFIZER SARS-COV-2 VACCINE: CPT

## 2021-03-30 ENCOUNTER — OFFICE VISIT (OUTPATIENT)
Dept: MEDICAL GROUP | Facility: PHYSICIAN GROUP | Age: 76
End: 2021-03-30
Payer: MEDICARE

## 2021-03-30 VITALS
WEIGHT: 176 LBS | OXYGEN SATURATION: 97 % | SYSTOLIC BLOOD PRESSURE: 120 MMHG | DIASTOLIC BLOOD PRESSURE: 60 MMHG | TEMPERATURE: 97.5 F | RESPIRATION RATE: 16 BRPM | HEIGHT: 66 IN | HEART RATE: 70 BPM | BODY MASS INDEX: 28.28 KG/M2

## 2021-03-30 DIAGNOSIS — E78.00 ELEVATED LDL CHOLESTEROL LEVEL: ICD-10-CM

## 2021-03-30 DIAGNOSIS — I10 ESSENTIAL HYPERTENSION: ICD-10-CM

## 2021-03-30 DIAGNOSIS — H61.92 SKIN LESION OF LEFT EAR: ICD-10-CM

## 2021-03-30 PROCEDURE — 99214 OFFICE O/P EST MOD 30 MIN: CPT | Performed by: PHYSICIAN ASSISTANT

## 2021-03-30 ASSESSMENT — FIBROSIS 4 INDEX: FIB4 SCORE: 1.95

## 2021-03-30 NOTE — PROGRESS NOTES
Chief Complaint   Patient presents with   • Follow-Up     BP       HISTORY OF PRESENT ILLNESS: Rosenda Cowan is an established 75 y.o. female here to discuss the evaluation and management of:    Essential hypertension  Chronic but stable problem.  Patient's blood pressure during today's point 120/60 mmHg.  During patient's last appointment on 3/9/2021 hydrochlorothiazide 12.5 mg tab once daily was prescribed and patient was advised to continue losartan 100 mg tab once daily.  She tells me she has been taking antihypertensive medications as prescribed and denies experiencing side effects or complications from medications.  She feels blood pressure is better managed on this regimen.  She tells me that she has noticed her blood pressure readings have been slightly more elevated in the evenings.  She denies chest pain, shortness of breath, heart palpitations, dizziness, syncope, severe headache, vision changes, peripheral edema.    Elevated LDL cholesterol level  Chronic problem.  Could be better controlled.  Patient was prescribed Lipitor 20 mg tab once daily.  She tells me that medication made her not feel well so she discontinued medication a few days ago.  Patient does not want to restart a statin medication.  States she would like to work on diet and exercise.    Skin lesion of left ear  Physical examination noticed 2 nonhealing skin lesions of left ear.  Patient states skin lesions have been there for several months.  She denies erythema/warmth/discharge/color changes.  Patient has agreed to follow-up with dermatology.          Patient Active Problem List    Diagnosis Date Noted   • Elevated LDL cholesterol level 01/07/2020   • Vitamin D deficiency 10/01/2019   • Post herpetic neuralgia 06/14/2019   • Essential hypertension 06/14/2019   • Rectocele 06/14/2019   • Cystocele with uterine prolapse 12/28/2017       Allergies:Patient has no known allergies.    Current Outpatient Medications   Medication Sig  Dispense Refill   • hydroCHLOROthiazide (HYDRODIURIL) 12.5 MG tablet Take 1 tablet by mouth every day. 90 tablet 1   • atorvastatin (LIPITOR) 20 MG Tab Take 1 Tab by mouth every evening. 90 Tab 3   • losartan (COZAAR) 100 MG Tab Take 1 Tab by mouth every day. 90 Tab 3   • estradiol (ESTRACE) 0.1 MG/GM vaginal cream Insert 0.5 g in vagina Every  and Wednesday. 42.5 g 5   • Calcium Carb-Cholecalciferol (CALCIUM 600+D3) 600-800 MG-UNIT Tab Take  by mouth.     • Multiple Vitamins-Minerals (MULTIPLE VITAMINS/WOMENS) Tab Take  by mouth.     • LYRICA 150 MG Cap Take 1 Cap by mouth 3 times a day.     • oxycodone-acetaminophen (PERCOCET) 5-325 MG Tab Take 1-2 Tabs by mouth every four hours as needed.       No current facility-administered medications for this visit.       Social History     Tobacco Use   • Smoking status: Never Smoker   • Smokeless tobacco: Never Used   Substance Use Topics   • Alcohol use: Yes     Comment: 3 - 4 dirnks per month.    • Drug use: No       Family Status   Relation Name Status   • Mo     • Fa     • Sis  Alive   • Sis  Alive   • Sis  Alive   • Bridger  Alive   • Bridger  Alive     Family History   Problem Relation Age of Onset   • Hypertension Mother    • Heart Disease Father    • Hypertension Father    • Colon Cancer Father    • Other Sister         Intellectual Disability    • No Known Problems Daughter    • No Known Problems Daughter        ROS:  Review of Systems   Constitutional: Negative for fever, chills, weight loss and malaise/fatigue.   HENT: Negative for ear pain, nosebleeds, congestion, sore throat and neck pain.    Eyes: Negative for blurred vision.   Respiratory: Negative for cough, sputum production, shortness of breath and wheezing.    Cardiovascular: Negative for chest pain, palpitations, orthopnea and leg swelling.   Gastrointestinal: Negative for heartburn, nausea, vomiting and abdominal pain.   Genitourinary: Negative for dysuria, urgency and frequency.  "  Musculoskeletal: Negative for myalgias, back pain and joint pain.   Skin: Negative for rash and itching.  Positive for 2 nonhealing skin lesions of left ear.  Neurological: Negative for dizziness, tingling, tremors, sensory change, focal weakness and headaches.   Endo/Heme/Allergies: Does not bruise/bleed easily.   Psychiatric/Behavioral: Negative for depression, suicidal ideas and memory loss.  The patient is not nervous/anxious and does not have insomnia.    All other systems reviewed and are negative except as in HPI.    Exam: /60 (BP Location: Left arm, Patient Position: Sitting, BP Cuff Size: Adult)   Pulse 70   Temp 36.4 °C (97.5 °F) (Temporal)   Resp 16   Ht 1.676 m (5' 6\")   Wt 79.8 kg (176 lb)   SpO2 97%  Body mass index is 28.41 kg/m².  General: Normal appearing. No distress.  HEENT: Normocephalic. Eyes conjunctiva clear lids without ptosis, ears normal shape and contour.  Neck: Supple without JVD. Thyroid is not enlarged.  Pulmonary: Clear to ausculation.  Normal effort. No rales, ronchi, or wheezing.  Cardiovascular: Regular rate and rhythm with murmur.   Abdomen: Nondistended.  Neurologic: Grossly nonfocal.  Cranial nerves are normal.   Skin: Warm and dry.  No rashes.  Positive for 2+ nonhealing skin lesions of left ear.  Negative for erythema/warmth/discharge.  Negative for signs of cellulitis or excoriation.  Musculoskeletal: Normal gait. No extremity cyanosis, clubbing, or edema.  Psych: Normal mood and affect. Alert and oriented x3. Judgment and insight is normal.    Medical decision-making and discussion:  1. Essential hypertension  Well-controlled. Labs as indicated. Continue antihypertensive medications. Discussed decreasing salt intake. Emphasized benefits of exercise and diet. Continue to monitor.    Advised patient to take hydrochlorothiazide every a.m. and take losartan in the p.m.  Continue monitoring blood pressure at home.  Continue work on diet, exercise, sleep hygiene, " hydration, and decreasing sodium consumption.  Patient will complete lab work prior to follow-up appointment 3 months.  We will review lab work results during follow-up appointment as well as blood pressure.    Follow-up for worsening symptoms,lack of expected recovery, or should new symptoms or problems arise.    - Comp Metabolic Panel; Future    2. Elevated LDL cholesterol level  Chronic problem.  Could be better controlled.  Discussed 10-year ASCVD and CVD risk course with patient.  Patient is high risk.  Patient states Lipitor made her feel unwell.  She discontinued medication a few days ago.  Patient does not want to be placed back on a statin medication.  Discussed Crestor.  She will repeat lab work in 3 months for evaluation.  We will readdress statin medication at that time.    - Encouraged diet high in fruits, vegetables, and fiber. And a diet low in salt, refined carbohydrates, cholesterol, saturated fat, and trans fatty acids.    - Encouraged  a minimum of 30 minutes of moderate intensity aerobic exercise (eg, brisk walking) is recommended on five days each week. Or 20 minutes of vigorous-intensity aerobic exercise (eg, jogging) on three days each week.       - Lipid Profile; Future    3. Skin lesion of left ear  She has referred to dermatology for further evaluation.  Continue to monitor.    Follow-up for worsening symptoms,lack of expected recovery, or should new symptoms or problems arise.      - REFERRAL TO DERMATOLOGY      Please note that this dictation was created using voice recognition software. I have made every reasonable attempt to correct obvious errors, but I expect that there are errors of grammar and possibly content that I did not discover before finalizing the note.    Assessment/Plan:  1. Essential hypertension  Comp Metabolic Panel   2. Elevated LDL cholesterol level  Lipid Profile   3. Skin lesion of left ear  REFERRAL TO DERMATOLOGY       Return in about 3 months (around  6/30/2021).

## 2021-04-16 ENCOUNTER — IMMUNIZATION (OUTPATIENT)
Dept: FAMILY PLANNING/WOMEN'S HEALTH CLINIC | Facility: IMMUNIZATION CENTER | Age: 76
End: 2021-04-16
Attending: INTERNAL MEDICINE
Payer: MEDICARE

## 2021-04-16 DIAGNOSIS — Z23 ENCOUNTER FOR VACCINATION: Primary | ICD-10-CM

## 2021-04-16 PROCEDURE — 0002A PFIZER SARS-COV-2 VACCINE: CPT

## 2021-04-16 PROCEDURE — 91300 PFIZER SARS-COV-2 VACCINE: CPT

## 2021-04-23 ENCOUNTER — GYNECOLOGY VISIT (OUTPATIENT)
Dept: OBGYN | Facility: CLINIC | Age: 76
End: 2021-04-23
Payer: MEDICARE

## 2021-04-23 VITALS — BODY MASS INDEX: 28.41 KG/M2 | WEIGHT: 176 LBS | SYSTOLIC BLOOD PRESSURE: 129 MMHG | DIASTOLIC BLOOD PRESSURE: 63 MMHG

## 2021-04-23 DIAGNOSIS — Z46.89 PESSARY MAINTENANCE: ICD-10-CM

## 2021-04-23 PROCEDURE — 99213 OFFICE O/P EST LOW 20 MIN: CPT | Performed by: OBSTETRICS & GYNECOLOGY

## 2021-04-23 ASSESSMENT — FIBROSIS 4 INDEX: FIB4 SCORE: 1.95

## 2021-04-23 NOTE — PROGRESS NOTES
Pessary Insertion:  S: 75 y.o.  here for pessary insertion and cleaning  Pessary type: #3 ring with support    PE:  Vitals:    21 1054   BP: 129/63     SSE: Vagina looks much better estrogenized than last time, no areas of excoriation    AP:     Pt states pessary was comfortable and there was no slipping of the device  Pessary was checked for placement and fit after several minutes of mild activity and noted to be in place.    Pt is to call for any pain, discharge or bleeding from the device.

## 2021-04-23 NOTE — PROGRESS NOTES
Patient here for a GYN follow up.   Last seen on 02/23/2021  C/o Pessary cleaning   pharmacy verified.  Patient phone #: [phone]

## 2021-07-28 ENCOUNTER — HOSPITAL ENCOUNTER (OUTPATIENT)
Dept: LAB | Facility: MEDICAL CENTER | Age: 76
End: 2021-07-28
Attending: PHYSICIAN ASSISTANT
Payer: MEDICARE

## 2021-07-28 DIAGNOSIS — E78.00 ELEVATED LDL CHOLESTEROL LEVEL: ICD-10-CM

## 2021-07-28 DIAGNOSIS — I10 ESSENTIAL HYPERTENSION: ICD-10-CM

## 2021-07-28 PROCEDURE — 80053 COMPREHEN METABOLIC PANEL: CPT

## 2021-07-28 PROCEDURE — 36415 COLL VENOUS BLD VENIPUNCTURE: CPT

## 2021-07-28 PROCEDURE — 80061 LIPID PANEL: CPT

## 2021-07-29 LAB
ALBUMIN SERPL BCP-MCNC: 4 G/DL (ref 3.2–4.9)
ALBUMIN/GLOB SERPL: 1.1 G/DL
ALP SERPL-CCNC: 101 U/L (ref 30–99)
ALT SERPL-CCNC: 21 U/L (ref 2–50)
ANION GAP SERPL CALC-SCNC: 7 MMOL/L (ref 7–16)
AST SERPL-CCNC: 27 U/L (ref 12–45)
BILIRUB SERPL-MCNC: 0.4 MG/DL (ref 0.1–1.5)
BUN SERPL-MCNC: 17 MG/DL (ref 8–22)
CALCIUM SERPL-MCNC: 9.6 MG/DL (ref 8.5–10.5)
CHLORIDE SERPL-SCNC: 101 MMOL/L (ref 96–112)
CHOLEST SERPL-MCNC: 213 MG/DL (ref 100–199)
CO2 SERPL-SCNC: 29 MMOL/L (ref 20–33)
CREAT SERPL-MCNC: 0.83 MG/DL (ref 0.5–1.4)
FASTING STATUS PATIENT QL REPORTED: NORMAL
GLOBULIN SER CALC-MCNC: 3.6 G/DL (ref 1.9–3.5)
GLUCOSE SERPL-MCNC: 84 MG/DL (ref 65–99)
HDLC SERPL-MCNC: 53 MG/DL
LDLC SERPL CALC-MCNC: 149 MG/DL
POTASSIUM SERPL-SCNC: 4.4 MMOL/L (ref 3.6–5.5)
PROT SERPL-MCNC: 7.6 G/DL (ref 6–8.2)
SODIUM SERPL-SCNC: 137 MMOL/L (ref 135–145)
TRIGL SERPL-MCNC: 55 MG/DL (ref 0–149)

## 2021-07-30 ENCOUNTER — OFFICE VISIT (OUTPATIENT)
Dept: MEDICAL GROUP | Facility: PHYSICIAN GROUP | Age: 76
End: 2021-07-30
Payer: MEDICARE

## 2021-07-30 VITALS
BODY MASS INDEX: 28.28 KG/M2 | SYSTOLIC BLOOD PRESSURE: 124 MMHG | TEMPERATURE: 97.1 F | OXYGEN SATURATION: 95 % | HEIGHT: 66 IN | HEART RATE: 75 BPM | RESPIRATION RATE: 18 BRPM | DIASTOLIC BLOOD PRESSURE: 70 MMHG | WEIGHT: 176 LBS

## 2021-07-30 DIAGNOSIS — E78.00 ELEVATED LDL CHOLESTEROL LEVEL: ICD-10-CM

## 2021-07-30 DIAGNOSIS — Z13.820 SCREENING FOR OSTEOPOROSIS: ICD-10-CM

## 2021-07-30 DIAGNOSIS — Z12.31 ENCOUNTER FOR SCREENING MAMMOGRAM FOR BREAST CANCER: ICD-10-CM

## 2021-07-30 DIAGNOSIS — Z11.59 ENCOUNTER FOR HEPATITIS C SCREENING TEST FOR LOW RISK PATIENT: ICD-10-CM

## 2021-07-30 DIAGNOSIS — I10 ESSENTIAL HYPERTENSION: ICD-10-CM

## 2021-07-30 DIAGNOSIS — Z78.0 POSTMENOPAUSAL ESTROGEN DEFICIENCY: ICD-10-CM

## 2021-07-30 PROCEDURE — 99214 OFFICE O/P EST MOD 30 MIN: CPT | Performed by: PHYSICIAN ASSISTANT

## 2021-07-30 ASSESSMENT — FIBROSIS 4 INDEX: FIB4 SCORE: 1.88

## 2021-07-30 NOTE — PROGRESS NOTES
Chief Complaint   Patient presents with   • Follow-Up     lab review       HISTORY OF PRESENT ILLNESS: Rosenda Cowan is an established 75 y.o. female here to discuss the evaluation and management of:    Patient is a pleasant 75-year-old female here today to follow-up on hypertension discussed recent lab work results.  Patient's blood pressure during today's appointment 124/70 L Hg.  She is taking losartan 100 mg tab once daily and hydrochlorothiazide 12.5 mg tab once daily.  She denies side effects or complications from medication.  She has been monitoring her blood pressure at home and at home readings are similar to today's readings.  She mentions that she has been on vacation for the past 3 weeks and her diet has been unhealthy.  States she has been exercising regularly.  Reviewed recent lab work results with patient and total cholesterol and bad cholesterol were elevated.  Total cholesterol 213 and bad cholesterol 149.  Patient is taking atorvastatin 20 mg tab once daily.  Denies side effects or complications of medication.    Patient is due for mammogram and DEXA scan.  She tells me that she takes supplemental vitamin D and calcium.  She is currently not doing weightbearing exercise.  Denies recurrent fractures.      Patient Active Problem List    Diagnosis Date Noted   • Elevated LDL cholesterol level 01/07/2020   • Vitamin D deficiency 10/01/2019   • Post herpetic neuralgia 06/14/2019   • Essential hypertension 06/14/2019   • Rectocele 06/14/2019   • Cystocele with uterine prolapse 12/28/2017       Allergies:Patient has no known allergies.    Current Outpatient Medications   Medication Sig Dispense Refill   • hydroCHLOROthiazide (HYDRODIURIL) 12.5 MG tablet Take 1 tablet by mouth every day. 90 tablet 1   • atorvastatin (LIPITOR) 20 MG Tab Take 1 Tab by mouth every evening. 90 Tab 3   • losartan (COZAAR) 100 MG Tab Take 1 Tab by mouth every day. 90 Tab 3   • estradiol (ESTRACE) 0.1 MG/GM vaginal cream  Insert 0.5 g in vagina Every  and Wednesday. 42.5 g 5   • Calcium Carb-Cholecalciferol (CALCIUM 600+D3) 600-800 MG-UNIT Tab Take  by mouth.     • Multiple Vitamins-Minerals (MULTIPLE VITAMINS/WOMENS) Tab Take  by mouth.     • LYRICA 150 MG Cap Take 1 Cap by mouth 3 times a day.     • oxycodone-acetaminophen (PERCOCET) 5-325 MG Tab Take 1-2 Tabs by mouth every four hours as needed.       No current facility-administered medications for this visit.       Social History     Tobacco Use   • Smoking status: Never Smoker   • Smokeless tobacco: Never Used   Vaping Use   • Vaping Use: Never used   Substance Use Topics   • Alcohol use: Yes     Comment: 3 - 4 dirnks per month.    • Drug use: No       Family Status   Relation Name Status   • Mo     • Fa     • Sis  Alive   • Sis  Alive   • Sis  Alive   • Bridger  Alive   • Bridger  Alive     Family History   Problem Relation Age of Onset   • Hypertension Mother    • Heart Disease Father    • Hypertension Father    • Colon Cancer Father    • Other Sister         Intellectual Disability    • No Known Problems Daughter    • No Known Problems Daughter        ROS:  Review of Systems   Constitutional: Negative for fever, chills, weight loss and malaise/fatigue.   HENT: Negative for ear pain, nosebleeds, congestion, sore throat and neck pain.    Eyes: Negative for blurred vision.   Respiratory: Negative for cough, sputum production, shortness of breath and wheezing.    Cardiovascular: Negative for chest pain, palpitations, orthopnea and leg swelling.   Gastrointestinal: Negative for heartburn, nausea, vomiting and abdominal pain.   Genitourinary: Negative for dysuria, urgency and frequency.   Musculoskeletal: Negative for myalgias, back pain and joint pain.   Skin: Negative for rash and itching.   Neurological: Negative for dizziness, tingling, tremors, sensory change, focal weakness and headaches.   Endo/Heme/Allergies: Does not bruise/bleed easily.  "  Psychiatric/Behavioral: Negative for depression, suicidal ideas and memory loss.  The patient is not nervous/anxious and does not have insomnia.    All other systems reviewed and are negative except as in HPI.    Exam: /70 (BP Location: Left arm, Patient Position: Sitting, BP Cuff Size: Adult)   Pulse 75   Temp 36.2 °C (97.1 °F) (Temporal)   Resp 18   Ht 1.676 m (5' 6\")   Wt 79.8 kg (176 lb)   SpO2 95%  Body mass index is 28.41 kg/m².  General: Normal appearing. No distress.  HEENT: Normocephalic. Eyes conjunctiva clear lids without ptosis, ears normal shape and contour.  Neck: Supple without JVD. Thyroid is not enlarged.  Pulmonary: Clear to ausculation.  Normal effort. No rales, ronchi, or wheezing.  Cardiovascular: Regular rate and rhythm without murmur.   Abdomen: Nondistended.   Neurologic: Grossly nonfocal.  Cranial nerves are normal.   Lymph: No cervical or supraclavicular lymph nodes are palpable  Skin: Warm and dry.  No rashes or suspicious skin lesions.  Musculoskeletal: Normal gait. No extremity cyanosis, clubbing, or edema.  Psych: Normal mood and affect. Alert and oriented x3. Judgment and insight is normal.    Medical decision-making and discussion:  1. Essential hypertension  Well-controlled. Labs as indicated. Continue antihypertensive medications. Discussed decreasing salt intake. Emphasized benefits of exercise and diet. Continue to monitor.    - Comp Metabolic Panel; Future  - Lipid Profile; Future    2. Elevated LDL cholesterol level  Reviewed recent lipid profile lab work results with patient.  Total cholesterol and bad cholesterol were elevated.  Patient does not want to be placed on a statin medication.  Discussed 10-year ASCVD and CVD scores with patient.  She would like to continue working on diet and exercise and repeat lab work in 3 months.  Discussed importance of healthy diet regular exercise routine with patient.  Patient repeat lab work in 3 months and follow-up in 3 " months for reevaluation.    - Comp Metabolic Panel; Future  - Lipid Profile; Future    3. Encounter for screening mammogram for breast cancer    - MA-SCREENING MAMMO BILAT W/TOMOSYNTHESIS W/CAD; Future    4. Postmenopausal estrogen deficiency  Advised patient to continue taking vitamin D and calcium supplementation.  Discussed importance of weightbearing exercises.  DEXA scan has been ordered.    - DS-BONE DENSITY STUDY (DEXA)    5. Screening for osteoporosis  Same as # 4.    - DS-BONE DENSITY STUDY (DEXA)    6. Encounter for hepatitis C screening test for low risk patient    - HCV Scrn ( [id number] [facility]); Future      Please note that this dictation was created using voice recognition software. I have made every reasonable attempt to correct obvious errors, but I expect that there are errors of grammar and possibly content that I did not discover before finalizing the note.    Assessment/Plan:  1. Essential hypertension  Comp Metabolic Panel    Lipid Profile   2. Elevated LDL cholesterol level  Comp Metabolic Panel    Lipid Profile   3. Encounter for screening mammogram for breast cancer  MA-SCREENING MAMMO BILAT W/TOMOSYNTHESIS W/CAD   4. Postmenopausal estrogen deficiency  DS-BONE DENSITY STUDY (DEXA)   5. Screening for osteoporosis  DS-BONE DENSITY STUDY (DEXA)   6. Encounter for hepatitis C screening test for low risk patient  HCV Scrn ( [id number] [facility])       Return in about 3 months (around 10/30/2021), or if symptoms worsen or fail to improve.

## 2021-08-25 ENCOUNTER — TELEPHONE (OUTPATIENT)
Dept: MEDICAL GROUP | Facility: PHYSICIAN GROUP | Age: 76
End: 2021-08-25

## 2021-08-26 DIAGNOSIS — H61.92 SKIN LESION OF LEFT EAR: ICD-10-CM

## 2021-08-30 ENCOUNTER — GYNECOLOGY VISIT (OUTPATIENT)
Dept: OBGYN | Facility: CLINIC | Age: 76
End: 2021-08-30
Payer: MEDICARE

## 2021-08-30 VITALS — SYSTOLIC BLOOD PRESSURE: 124 MMHG | WEIGHT: 174 LBS | BODY MASS INDEX: 28.08 KG/M2 | DIASTOLIC BLOOD PRESSURE: 66 MMHG

## 2021-08-30 DIAGNOSIS — N81.6 RECTOCELE: ICD-10-CM

## 2021-08-30 DIAGNOSIS — Z46.89 PESSARY MAINTENANCE: ICD-10-CM

## 2021-08-30 DIAGNOSIS — N81.4 CYSTOCELE WITH UTERINE PROLAPSE: ICD-10-CM

## 2021-08-30 PROCEDURE — 99213 OFFICE O/P EST LOW 20 MIN: CPT | Performed by: OBSTETRICS & GYNECOLOGY

## 2021-08-30 ASSESSMENT — FIBROSIS 4 INDEX: FIB4 SCORE: 1.88

## 2021-08-30 NOTE — PROGRESS NOTES
Pessary Insertion:  S: 75 y.o.  here for pessary insertion and cleaning  Pessary type: #3 ring with support  Last visit was in April, since then she has had vacation.  Had some increased discharge, and inc her estrogen to 3x per week.     PE:  There were no vitals filed for this visit.  SSE: some excoriated areas of vagina at top of vaginal cuff, rest of vagina well estrogenized    AP:     Discussed removing on own and will try at home   Pt states pessary was comfortable and there was no slipping of the device  Pessary was checked for placement and fit after several minutes of mild activity and noted to be in place.    Pt is to call for any pain, discharge or bleeding from the device.

## 2021-09-15 DIAGNOSIS — I10 ESSENTIAL HYPERTENSION: ICD-10-CM

## 2021-09-16 RX ORDER — HYDROCHLOROTHIAZIDE 12.5 MG/1
TABLET ORAL
Qty: 90 TABLET | Refills: 0 | Status: SHIPPED | OUTPATIENT
Start: 2021-09-16 | End: 2021-10-27 | Stop reason: SDUPTHER

## 2021-10-27 ENCOUNTER — OFFICE VISIT (OUTPATIENT)
Dept: MEDICAL GROUP | Facility: PHYSICIAN GROUP | Age: 76
End: 2021-10-27
Payer: MEDICARE

## 2021-10-27 ENCOUNTER — TELEPHONE (OUTPATIENT)
Dept: OBGYN | Facility: CLINIC | Age: 76
End: 2021-10-27

## 2021-10-27 VITALS
HEART RATE: 80 BPM | DIASTOLIC BLOOD PRESSURE: 60 MMHG | WEIGHT: 177.6 LBS | HEIGHT: 66 IN | TEMPERATURE: 98.7 F | OXYGEN SATURATION: 97 % | SYSTOLIC BLOOD PRESSURE: 140 MMHG | BODY MASS INDEX: 28.54 KG/M2 | RESPIRATION RATE: 20 BRPM

## 2021-10-27 DIAGNOSIS — I10 ESSENTIAL HYPERTENSION: ICD-10-CM

## 2021-10-27 DIAGNOSIS — Z23 NEED FOR VACCINATION: ICD-10-CM

## 2021-10-27 PROCEDURE — G0008 ADMIN INFLUENZA VIRUS VAC: HCPCS | Performed by: PHYSICIAN ASSISTANT

## 2021-10-27 PROCEDURE — 90662 IIV NO PRSV INCREASED AG IM: CPT | Performed by: PHYSICIAN ASSISTANT

## 2021-10-27 PROCEDURE — 99213 OFFICE O/P EST LOW 20 MIN: CPT | Mod: 25 | Performed by: PHYSICIAN ASSISTANT

## 2021-10-27 RX ORDER — LOSARTAN POTASSIUM 100 MG/1
100 TABLET ORAL DAILY
Qty: 90 TABLET | Refills: 3 | Status: SHIPPED | OUTPATIENT
Start: 2021-10-27 | End: 2022-12-05 | Stop reason: SDUPTHER

## 2021-10-27 RX ORDER — HYDROCHLOROTHIAZIDE 12.5 MG/1
TABLET ORAL
Qty: 90 TABLET | Refills: 3 | Status: SHIPPED | OUTPATIENT
Start: 2021-10-27 | End: 2022-11-21

## 2021-10-27 ASSESSMENT — FIBROSIS 4 INDEX: FIB4 SCORE: 1.88

## 2021-10-27 NOTE — TELEPHONE ENCOUNTER
10/27/21 9:32 AM    Pt needed Rx refills to [facility] pharmacy, called and gave pt 3 refills of estrogen, pt saw Dr. Rosa in 8/2021

## 2021-10-27 NOTE — PROGRESS NOTES
Chief Complaint   Patient presents with   • Hypertension Follow-up       HISTORY OF PRESENT ILLNESS: Rosenda Cowan is an established 75 y.o. female here to discuss the evaluation and management of:    Essential hypertension  Chronic but stable problem. Patient's blood pressure during today's appointment 140/60 mmHg. Patient tells me she is taking hydrochlorothiazide 12.5 mg tab once daily, and losartan 100 mg tab once daily. She denies side effects or complications of medication. States at home systolic average ranges in the upper 120-mid 130 range and diastolic is similar to today's readings. Patient states she is been working on exercising regularly and exercise routine consist of walking 3 times a week 40-60 minutes per time.  She denies chest pain, shortness of breath, heart palpitations, dizziness, syncope, severe headache, vision changes, peripheral edema.      Patient Active Problem List    Diagnosis Date Noted   • Pessary maintenance 08/30/2021   • Elevated LDL cholesterol level 01/07/2020   • Vitamin D deficiency 10/01/2019   • Post herpetic neuralgia 06/14/2019   • Essential hypertension 06/14/2019   • Rectocele 06/14/2019   • Cystocele with uterine prolapse 12/28/2017       Allergies:Patient has no known allergies.    Current Outpatient Medications   Medication Sig Dispense Refill   • hydroCHLOROthiazide (HYDRODIURIL) 12.5 MG tablet Take 1 tablet by mouth once daily 90 Tablet 3   • losartan (COZAAR) 100 MG Tab Take 1 Tablet by mouth every day. 90 Tablet 3   • atorvastatin (LIPITOR) 20 MG Tab Take 1 Tab by mouth every evening. 90 Tab 3   • estradiol (ESTRACE) 0.1 MG/GM vaginal cream Insert 0.5 g in vagina Every Sunday and Wednesday. 42.5 g 5   • Calcium Carb-Cholecalciferol (CALCIUM 600+D3) 600-800 MG-UNIT Tab Take  by mouth.     • Multiple Vitamins-Minerals (MULTIPLE VITAMINS/WOMENS) Tab Take  by mouth.     • LYRICA 150 MG Cap Take 1 Cap by mouth 3 times a day.     • oxycodone-acetaminophen  (PERCOCET) 5-325 MG Tab Take 1-2 Tabs by mouth every four hours as needed.       No current facility-administered medications for this visit.       Social History     Tobacco Use   • Smoking status: Never Smoker   • Smokeless tobacco: Never Used   Vaping Use   • Vaping Use: Never used   Substance Use Topics   • Alcohol use: Yes     Comment: 3 - 4 dirnks per month.    • Drug use: No       Family Status   Relation Name Status   • Mo     • Fa     • Sis  Alive   • Sis  Alive   • Sis  Alive   • Bridger  Alive   • Bridger  Alive     Family History   Problem Relation Age of Onset   • Hypertension Mother    • Heart Disease Father    • Hypertension Father    • Colon Cancer Father    • Other Sister         Intellectual Disability    • No Known Problems Daughter    • No Known Problems Daughter        ROS:  Review of Systems   Constitutional: Negative for fever, chills, weight loss and malaise/fatigue.   HENT: Negative for ear pain, nosebleeds, congestion, sore throat and neck pain.    Eyes: Negative for blurred vision.   Respiratory: Negative for cough, sputum production, shortness of breath and wheezing.    Cardiovascular: Negative for chest pain, palpitations, orthopnea and leg swelling.   Gastrointestinal: Negative for heartburn, nausea, vomiting and abdominal pain.   Genitourinary: Negative for dysuria, urgency and frequency.   Musculoskeletal: Negative for myalgias, back pain and joint pain.   Skin: Negative for rash and itching.   Neurological: Negative for dizziness, tingling, tremors, sensory change, focal weakness and headaches.   Endo/Heme/Allergies: Does not bruise/bleed easily.   Psychiatric/Behavioral: Negative for depression, suicidal ideas and memory loss.  The patient is not nervous/anxious and does not have insomnia.    All other systems reviewed and are negative except as in HPI.    Exam: /60 (BP Location: Right arm, Patient Position: Sitting, BP Cuff Size: Large adult)   Pulse 80   Temp 37.1  "°C (98.7 °F) (Temporal)   Resp 20   Ht 1.676 m (5' 6\")   Wt 80.6 kg (177 lb 9.6 oz)   SpO2 97%  Body mass index is 28.67 kg/m².  General: Normal appearing. No distress.  HEENT: Normocephalic. Eyes conjunctiva clear lids without ptosis, ears normal shape and contour.  Neck: Supple without JVD. Thyroid is not enlarged.  Pulmonary: Clear to ausculation.  Normal effort. No rales, ronchi, or wheezing.  Cardiovascular: Regular rate and rhythm without murmur.  Abdomen: Nondistended.   Neurologic: Grossly nonfocal.  Cranial nerves are normal.   Skin: Warm and dry.  No rashes or suspicious skin lesions.  Musculoskeletal: Normal gait. No extremity cyanosis, clubbing, or edema.  Psych: Normal mood and affect. Alert and oriented x3. Judgment and insight is normal.    Medical decision-making and discussion:  1. Essential hypertension  Well-controlled. Labs as indicated. Continue antihypertensive medications. Discussed decreasing salt intake. Emphasized benefits of exercise and diet. Continue to monitor.    - hydroCHLOROthiazide (HYDRODIURIL) 12.5 MG tablet; Take 1 tablet by mouth once daily  Dispense: 90 Tablet; Refill: 3  - losartan (COZAAR) 100 MG Tab; Take 1 Tablet by mouth every day.  Dispense: 90 Tablet; Refill: 3    2. Need for vaccination  A flu vaccination was administered to patient without complication. A VIS form was provided to patient.     - INFLUENZA VACCINE, HIGH DOSE (65+ ONLY)      Please note that this dictation was created using voice recognition software. I have made every reasonable attempt to correct obvious errors, but I expect that there are errors of grammar and possibly content that I did not discover before finalizing the note.    Assessment/Plan:  1. Essential hypertension  hydroCHLOROthiazide (HYDRODIURIL) 12.5 MG tablet    losartan (COZAAR) 100 MG Tab   2. Need for vaccination  INFLUENZA VACCINE, HIGH DOSE (65+ ONLY)       Return if symptoms worsen or fail to improve.  "

## 2021-12-07 ENCOUNTER — OFFICE VISIT (OUTPATIENT)
Dept: MEDICAL GROUP | Facility: PHYSICIAN GROUP | Age: 76
End: 2021-12-07
Payer: MEDICARE

## 2021-12-07 VITALS
BODY MASS INDEX: 28.42 KG/M2 | DIASTOLIC BLOOD PRESSURE: 72 MMHG | SYSTOLIC BLOOD PRESSURE: 128 MMHG | HEIGHT: 66 IN | WEIGHT: 176.8 LBS | TEMPERATURE: 98.6 F | HEART RATE: 76 BPM | OXYGEN SATURATION: 96 % | RESPIRATION RATE: 20 BRPM

## 2021-12-07 DIAGNOSIS — E78.00 ELEVATED LDL CHOLESTEROL LEVEL: ICD-10-CM

## 2021-12-07 DIAGNOSIS — Z00.00 HEALTHCARE MAINTENANCE: ICD-10-CM

## 2021-12-07 DIAGNOSIS — B02.29 POST HERPETIC NEURALGIA: ICD-10-CM

## 2021-12-07 DIAGNOSIS — I10 ESSENTIAL HYPERTENSION: ICD-10-CM

## 2021-12-07 DIAGNOSIS — Z46.89 PESSARY MAINTENANCE: ICD-10-CM

## 2021-12-07 PROCEDURE — 99213 OFFICE O/P EST LOW 20 MIN: CPT | Performed by: FAMILY MEDICINE

## 2021-12-07 ASSESSMENT — ENCOUNTER SYMPTOMS
PALPITATIONS: 0
SHORTNESS OF BREATH: 0
MYALGIAS: 0
DIZZINESS: 0
DOUBLE VISION: 0
COUGH: 0
SORE THROAT: 0
HEADACHES: 0
CHILLS: 0
FEVER: 0
VOMITING: 0
BLURRED VISION: 0
NAUSEA: 0

## 2021-12-07 ASSESSMENT — FIBROSIS 4 INDEX: FIB4 SCORE: 1.9

## 2021-12-07 NOTE — ASSESSMENT & PLAN NOTE
Chronic, stable  Pt will stop hctz 12.5  Bring in logs, if at goal, cont only cozaar  If elevated will restart 12.5

## 2021-12-07 NOTE — ASSESSMENT & PLAN NOTE
Chronic  F/u repeat lipid  Will start pravastatin 10 after results if needed  Stop atrovastatin due to myalgia

## 2021-12-07 NOTE — PROGRESS NOTES
"Subjective:     CC: establish care    HPI:   Rosenda presents today with no concerns  Reports myalgias with atorvastatin stopped taking   No other concerns   Monitoring bp at home  Reports having a skin check with derm, s/p bx states normal     Current Outpatient Medications Ordered in Epic   Medication Sig Dispense Refill   • hydroCHLOROthiazide (HYDRODIURIL) 12.5 MG tablet Take 1 tablet by mouth once daily 90 Tablet 3   • losartan (COZAAR) 100 MG Tab Take 1 Tablet by mouth every day. 90 Tablet 3   • estradiol (ESTRACE) 0.1 MG/GM vaginal cream Insert 0.5 g in vagina Every Sunday and Wednesday. 42.5 g 5   • Calcium Carb-Cholecalciferol (CALCIUM 600+D3) 600-800 MG-UNIT Tab Take  by mouth.     • Multiple Vitamins-Minerals (MULTIPLE VITAMINS/WOMENS) Tab Take  by mouth.     • LYRICA 150 MG Cap Take 1 Cap by mouth 3 times a day.       No current [facility]-ordered facility-administered medications on file.     ROS:  Review of Systems   Constitutional: Negative for chills and fever.   HENT: Negative for ear pain and sore throat.    Eyes: Negative for blurred vision and double vision.   Respiratory: Negative for cough and shortness of breath.    Cardiovascular: Negative for chest pain and palpitations.   Gastrointestinal: Negative for nausea and vomiting.   Genitourinary: Negative for dysuria and hematuria.   Musculoskeletal: Negative for myalgias.   Neurological: Negative for dizziness and headaches.       Objective:     Exam:  /72 (BP Location: Left arm, Patient Position: Sitting, BP Cuff Size: Adult)   Pulse 76   Temp 37 °C (98.6 °F) (Temporal)   Resp 20   Ht 1.676 m (5' 6\")   Wt 80.2 kg (176 lb 12.8 oz)   LMP  (LMP Unknown)   SpO2 96%   BMI 28.54 kg/m²  Body mass index is 28.54 kg/m².    Physical Exam  Constitutional:       General: She is not in acute distress.     Appearance: Normal appearance. She is normal weight. She is not ill-appearing, toxic-appearing or diaphoretic.   HENT:      Head: Normocephalic and " atraumatic.   Eyes:      General: No scleral icterus.        Right eye: No discharge.         Left eye: No discharge.      Extraocular Movements: Extraocular movements intact.      Conjunctiva/sclera: Conjunctivae normal.      Pupils: Pupils are equal, round, and reactive to light.   Cardiovascular:      Rate and Rhythm: Normal rate and regular rhythm.      Pulses: Normal pulses.      Heart sounds: No murmur heard.      Pulmonary:      Effort: Pulmonary effort is normal. No respiratory distress.   Musculoskeletal:         General: No swelling.      Cervical back: Normal range of motion and neck supple. No rigidity or tenderness.      Right lower leg: No edema.      Left lower leg: No edema.   Lymphadenopathy:      Cervical: No cervical adenopathy.   Neurological:      Mental Status: She is alert.             Assessment & Plan:     76 y.o. female with the following -     Problem List Items Addressed This Visit     Post herpetic neuralgia     Chronic, stable  Cont lyrica and f/u with pain mgt         Essential hypertension     Chronic, stable  Pt will stop hctz 12.5  Bring in logs, if at goal, cont only cozaar  If elevated will restart 12.5           Relevant Orders    Comp Metabolic Panel    Elevated LDL cholesterol level     Chronic  F/u repeat lipid  Will start pravastatin 10 after results if needed  Stop atrovastatin due to myalgia         Relevant Orders    Lipid Profile    Pessary maintenance     Chronic, stable  Has good f/u with gyn             Other Visit Diagnoses     Healthcare maintenance        Relevant Orders    CBC WITHOUT DIFFERENTIAL        Return in about 6 months (around 6/7/2022) for f/u labs, f/u bp.    Please note that this dictation was created using voice recognition software. I have made every reasonable attempt to correct obvious errors, but I expect that there are errors of grammar and possibly content that I did not discover before finalizing the note.

## 2021-12-10 ENCOUNTER — GYNECOLOGY VISIT (OUTPATIENT)
Dept: OBGYN | Facility: CLINIC | Age: 76
End: 2021-12-10
Payer: MEDICARE

## 2021-12-10 VITALS — SYSTOLIC BLOOD PRESSURE: 142 MMHG | BODY MASS INDEX: 28.73 KG/M2 | DIASTOLIC BLOOD PRESSURE: 71 MMHG | WEIGHT: 178 LBS

## 2021-12-10 DIAGNOSIS — N81.4 CYSTOCELE WITH UTERINE PROLAPSE: ICD-10-CM

## 2021-12-10 DIAGNOSIS — N81.6 RECTOCELE: ICD-10-CM

## 2021-12-10 PROCEDURE — 99214 OFFICE O/P EST MOD 30 MIN: CPT | Performed by: OBSTETRICS & GYNECOLOGY

## 2021-12-10 ASSESSMENT — FIBROSIS 4 INDEX: FIB4 SCORE: 1.9

## 2021-12-10 NOTE — PROGRESS NOTES
Chief complaint;  Rosenda Cowan is a 76 y.o. female   with a long-standing history of symptomatic pelvic relaxation. Currently using diaphragm pessary with good results. Denies urinary symptoms such as frequency urgency or hematuria. Denies vaginal bleeding.    Past medical history-   Past Medical History:   Diagnosis Date   • Hypertension      Past surgical history-   Past Surgical History:   Procedure Laterality Date   • PB HYSTEROSCOPY,DX,SEP PROC N/A 10/16/2019    Procedure: HYSTEROSCOPY, DIAGNOSTIC;  Surgeon: Erin Gann M.D.;  Location: SURGERY SAME DAY [facility];  Service: Obstetrics   • DILATION AND CURETTAGE N/A 10/16/2019    Procedure: DILATION AND CURETTAGE;  Surgeon: Erin Gann M.D.;  Location: SURGERY SAME DAY [facility];  Service: Obstetrics   • APPENDECTOMY     • CHOLECYSTECTOMY     • TUBAL COAGULATION LAPAROSCOPIC BILATERAL       Patient has no known allergies.  Social history-   Social History     Socioeconomic History   • Marital status:      Spouse name: Not on file   • Number of children: Not on file   • Years of education: Not on file   • Highest education level: Not on file   Occupational History   • Not on file   Tobacco Use   • Smoking status: Never Smoker   • Smokeless tobacco: Never Used   Vaping Use   • Vaping Use: Never used   Substance and Sexual Activity   • Alcohol use: Yes     Comment: 3 - 4 dirnks per month.    • Drug use: No   • Sexual activity: Yes     Partners: Male     Birth control/protection: Post-Menopausal     Comment: .    Other Topics Concern   • Not on file   Social History Narrative   • Not on file     Social Determinants of Health     Financial Resource Strain:    • Difficulty of Paying Living Expenses: Not on file   Food Insecurity:    • Worried About Running Out of Food in the Last Year: Not on file   • Ran Out of Food in the Last Year: Not on file   Transportation Needs:    • Lack of Transportation (Medical): Not on  file   • Lack of Transportation (Non-Medical): Not on file   Physical Activity:    • Days of Exercise per Week: Not on file   • Minutes of Exercise per Session: Not on file   Stress:    • Feeling of Stress : Not on file   Social Connections:    • Frequency of Communication with Friends and Family: Not on file   • Frequency of Social Gatherings with Friends and Family: Not on file   • Attends Spiritism Services: Not on file   • Active Member of Clubs or Organizations: Not on file   • Attends Club or Organization Meetings: Not on file   • Marital Status: Not on file   Intimate Partner Violence:    • Fear of Current or Ex-Partner: Not on file   • Emotionally Abused: Not on file   • Physically Abused: Not on file   • Sexually Abused: Not on file   Housing Stability:    • Unable to Pay for Housing in the Last Year: Not on file   • Number of Places Lived in the Last Year: Not on file   • Unstable Housing in the Last Year: Not on file     Family History- no history of breast or ovarian cancer    Physical examination;  Alert and oriented x3  General-well-developed well-nourished female in no apparent distress  /71 (BP Location: Right arm, Patient Position: Sitting)   Wt 80.7 kg (178 lb)   Skin-warm and dry  Neck-supple, no masses  Abdomen is nondistended positive bowel sounds soft nontender without masses or hepatosplenomegaly  Pelvic examination;  External genitalia-no visible lesions  Vagina-no blood or discharge, positive cystocele, positive rectocele, pessary removed cleaned and replaced  Cervix-significant descensus no gross lesions  Uterus-normal size and shape, nontender  Adnexa- not palpable  Extremities without cyanosis clubbing or edema  Neurologic-grossly intact    Impression;  Symptomatic pelvic relaxation  Cystocele  Rectocele    Plan;  Followup in 4-6 weeks      25 minutes spent with the patient in face-to-face contact, greater than 50% of time spent on counseling and coordination of care

## 2022-03-03 ENCOUNTER — GYNECOLOGY VISIT (OUTPATIENT)
Dept: OBGYN | Facility: CLINIC | Age: 77
End: 2022-03-03
Payer: MEDICARE

## 2022-03-03 VITALS — WEIGHT: 178 LBS | BODY MASS INDEX: 28.73 KG/M2 | DIASTOLIC BLOOD PRESSURE: 77 MMHG | SYSTOLIC BLOOD PRESSURE: 151 MMHG

## 2022-03-03 DIAGNOSIS — Z46.89 PESSARY MAINTENANCE: ICD-10-CM

## 2022-03-03 PROCEDURE — A4561 PESSARY RUBBER, ANY TYPE: HCPCS | Performed by: OBSTETRICS & GYNECOLOGY

## 2022-03-03 PROCEDURE — 57160 INSERT PESSARY/OTHER DEVICE: CPT | Performed by: OBSTETRICS & GYNECOLOGY

## 2022-03-03 RX ORDER — ESTRADIOL 2 MG/1
RING VAGINAL
Qty: 1 EACH | Refills: 3 | Status: SHIPPED | OUTPATIENT
Start: 2022-03-03 | End: 2022-05-23

## 2022-03-03 ASSESSMENT — FIBROSIS 4 INDEX: FIB4 SCORE: 1.9

## 2022-03-03 NOTE — NON-PROVIDER
HPI: here for pessary cleaning.  It started about a month ago.  Last week the bleeding increased to almost the point of a period, at which point she increased her Estrase use from 2x to 3x a week.  The bleeding has lessened somewhat.  She states that she has had raw patches in her vagina which have bled previously.    The pessary is working well, but she has noticed recently a malodor with increased discharge.  Does not have any urinary or fecal incontinence.  No itching, no burning.  No itching or burning when she pees, no malodorous urine.

## 2022-03-03 NOTE — PROGRESS NOTES
Pessary Insertion:  S: 76 y.o.  here for pessary insertion and cleaning  Bleeding started about a month ago.  Last week the bleeding increased to almost the point of a period, at which point she increased her Estrace use from 2x to 3x a week.  The bleeding has lessened somewhat.  She states that she has had raw patches in her vagina which have bled previously.     The pessary is working well, but she has noticed recently a malodor with increased discharge.  Does not have any urinary or fecal incontinence.  No itching, no burning.  No itching or burning when she pees, no malodorous urine.    Pessary type: #3 ring with support     PE:  Vitals:    22 1126   BP: 151/77     SSE: Excoriations extensively in posterior vault throughout and actively bleeding.  Silver nitrate used and hemostatic.  Copious premarin cream placed in back of vault.     AP:   Consider estring unless cost prohibitive to help estrogen needs in back of vagina where excoriations noted.  If able to get ring, return for replacement of pessary with ring  Pt states pessary was comfortable and there was no slipping of the device  Pessary was checked for placement and fit after several minutes of mild activity and noted to be in place.    Pt is to call for any pain, discharge or bleeding from the device.

## 2022-05-23 ENCOUNTER — GYNECOLOGY VISIT (OUTPATIENT)
Dept: OBGYN | Facility: CLINIC | Age: 77
End: 2022-05-23
Payer: MEDICARE

## 2022-05-23 VITALS
HEART RATE: 72 BPM | SYSTOLIC BLOOD PRESSURE: 158 MMHG | WEIGHT: 177 LBS | DIASTOLIC BLOOD PRESSURE: 78 MMHG | BODY MASS INDEX: 28.57 KG/M2

## 2022-05-23 DIAGNOSIS — Z46.89 ENCOUNTER FOR PESSARY MAINTENANCE: ICD-10-CM

## 2022-05-23 DIAGNOSIS — N89.8 VAGINAL EROSION SECONDARY TO PESSARY USE, INITIAL ENCOUNTER (HCC): ICD-10-CM

## 2022-05-23 DIAGNOSIS — N81.2 INCOMPLETE UTEROVAGINAL PROLAPSE: ICD-10-CM

## 2022-05-23 DIAGNOSIS — N95.2 ATROPHIC VAGINITIS: ICD-10-CM

## 2022-05-23 DIAGNOSIS — N81.12 CYSTOCELE, LATERAL: ICD-10-CM

## 2022-05-23 DIAGNOSIS — T83.89XA VAGINAL EROSION SECONDARY TO PESSARY USE, INITIAL ENCOUNTER (HCC): ICD-10-CM

## 2022-05-23 DIAGNOSIS — K59.09 CHRONIC CONSTIPATION: ICD-10-CM

## 2022-05-23 DIAGNOSIS — N81.6 RECTOCELE: ICD-10-CM

## 2022-05-23 PROCEDURE — 99203 OFFICE O/P NEW LOW 30 MIN: CPT | Performed by: STUDENT IN AN ORGANIZED HEALTH CARE EDUCATION/TRAINING PROGRAM

## 2022-05-23 RX ORDER — ESTRADIOL 0.1 MG/G
0.5 CREAM VAGINAL
Qty: 42.5 G | Refills: 5 | Status: SHIPPED | OUTPATIENT
Start: 2022-05-25 | End: 2023-11-07 | Stop reason: SDUPTHER

## 2022-05-23 ASSESSMENT — FIBROSIS 4 INDEX: FIB4 SCORE: 1.9

## 2022-05-23 NOTE — PROGRESS NOTES
PT here today for consult   PT States prev.  patient, here for pessary check   Hysterectomy? X   Good #: [phone] (home)   PVR : N/A  Pharmacy Verified

## 2022-05-23 NOTE — PROGRESS NOTES
"Urogynecology and Pelvic Reconstructive Surgery Consultation Visit    Rosenda Cowan MRN:[id number] :1945    Referred by: Paulette Rosa    Reason for Visit:   Chief Complaint   Patient presents with   • New Patient     Consult  N2U         Subjective     History of Presenting Illness:    Ms.Diane Miri Cowan is a 76 y.o. year old P2 who was referred by Dr. Rosa for the conitnued management of prolapse and pessary complications.     She reports doing overall well, but with continued vaginal spotting.     At her last visit with Dr. Rosa she has some significant bleeding. #3 ring with support. Last exam note \"Excoriations extensively in posterior vault throughout and actively bleeding.  Silver nitrate used and hemostatic.  Copious premarin cream placed in back of vault.\"    She underwent hysterosocpy wrokup in 2019, benign findings.     Her bladder doesn't bother her very much    She has some constipation, for which she takes fiber.     She is using the estrace cream thee times per week.     Prior Pelvic surgery:   10/16/2019: Hysteroscopy, D&C, Dr. Salomon  Tubal ligation      Prior treatment:   Pessary - #3 ring with support       Pelvic floor symptom review:     Bladder:   Voids per day: 5 Voids per night: 1      Urinary incontinence episodes per day: none    Bladder emptying: Complete   Voiding symptoms: None   UTI in last 12 months: No   Other urologic history: none      Prolapse:     Prolapse symptoms: Bulge, Exteriorized Tissue and Pelvic Pressure   Degree of prolapse: To Introitus        Bowel:    Constipation: Yes   Bowel movements per day: 1    Straining to empty bowels: No    Splinting to evacuate: No    Painful evacuation: No    Difficulty emptying rectum: No    Incontinence to stool: No   Incontinence to gas: No     Blood in stool: No    Hemorrhoids: No    Bowel conditions: none   Most recent colonoscopy: 2018       Sexual function:    Sexually active: Yes - although pessary does not get " in the way due to partner issues (obesity)   Gender of partners: Male   Pain with intercourse: No       Pelvic Pain: No      Past medical and surgical history    Past obstetric history   Number of vaginal deliveries: 2   Number of  deliveries: 0   History of vacuum/forceps: Yes   History of obstetric anal sphincter injury: No     Past gynecological history:    Last menstrual period: No LMP recorded (lmp unknown). Patient is postmenopausal.   History of abnormal uterine bleeding: Yes   History of fibroids: No    History of endometrial polyps:  No    History of endometriosis: No    History of cervical dysplasia: No      Past medical history:  Past Medical History:   Diagnosis Date   • Hypertension      Past surgical history:  Past Surgical History:   Procedure Laterality Date   • [place] HYSTEROSCOPY,DX,SEP PROC N/A 10/16/2019    Procedure: HYSTEROSCOPY, DIAGNOSTIC;  Surgeon: Erin Gann M.D.;  Location: SURGERY SAME DAY [facility];  Service: Obstetrics   • DILATION AND CURETTAGE N/A 10/16/2019    Procedure: DILATION AND CURETTAGE;  Surgeon: Erin Gann M.D.;  Location: SURGERY SAME DAY [facility];  Service: Obstetrics   • APPENDECTOMY     • CHOLECYSTECTOMY     • TUBAL COAGULATION LAPAROSCOPIC BILATERAL       Medications:has a current medication list which includes the following prescription(s): [START ON 2022] estradiol, hydrochlorothiazide, losartan, calcium 600+d3, multiple vitamins/womens, and lyrica.  Allergies:Patient has no known allergies.  Family history:  Family History   Problem Relation Age of Onset   • Hypertension Mother    • Heart Disease Father    • Hypertension Father    • Colon Cancer Father    • Other Sister         Intellectual Disability    • No Known Problems Daughter    • No Known Problems Daughter      Social history: reports that she has never smoked. She has never used smokeless tobacco. She reports current alcohol use. She reports that she does not use  drugs.    Review of systems: A full review of systems was performed, and negative with the exception of want is noted above in the HPI.        Objective        BP (!) 158/78 (BP Location: Right arm, Patient Position: Sitting, BP Cuff Size: Large adult)   Pulse 72   Wt 80.3 kg (177 lb)   LMP  (LMP Unknown)   BMI 28.57 kg/m²     Physical Exam  Vitals reviewed. Exam conducted with a chaperone present (MA - see notes.).   Constitutional:       Appearance: Normal appearance.   HENT:      Head: Normocephalic.      Mouth/Throat:      Mouth: Mucous membranes are moist.   Cardiovascular:      Rate and Rhythm: Normal rate.   Pulmonary:      Effort: Pulmonary effort is normal.   Abdominal:      Palpations: Abdomen is soft. There is no mass.      Tenderness: There is no abdominal tenderness.   Skin:     General: Skin is warm and dry.   Neurological:      Mental Status: She is alert.   Psychiatric:         Mood and Affect: Mood normal.         Genitourinary:    External female genitalia: WNL   Vulva: WNL   Bulbocavernosus reflex: Intact   Anal wink reflex: Intact   Perineal sensation: WNL   Urethra: Atrophic   Vagina: large deep posteriop erosion/ulceratuion at the apex with active bleeding after pessary removal. Controlled with silver nitrate and vaginal estrogen applied.    Atrophy: Moderate   Cough stress test: Negative    Pelvic floor:    POP-Q: obscured due to pessary - re-exam at next visit     Procedure Performed: No    Diagnostic test and records review:           Assessment & Plan     Ms.Diane Miri Cowan is a 76 y.o. year old P2 with prolapse, significant vaginal erosions. We discussed my recommendations for further diagnosis and treatment at length today.     1. Incomplete uterovaginal prolapse  2. Cystocele, lateral  3. Rectocele  4. Vaginal erosion secondary to pessary use, initial encounter ([facility])  -She has significant vaginal erosion and requires a pessary holiday.  The pessary was left out and she was advised  to continue her vaginal estrogen 1 g 3 times per week.  - I counseled that if the erosion is well-healed at 3 weeks we can restart pessary therapy if she likes, with shorter interval checks.  However, since she has had this persistent erosion for some time, it may be better that we pursue surgical management for definitive treatment of her prolapse.  -I reviewed with her the options of obliterative, vaginal reconstructive, mesh augmented reconstructive approaches.  She is given printed information about all of these approaches and we can discuss this in more detail at her next visit.    5. Vaginal atrophy  She has vaginal atrophy on examination. Reviewed risks, benefits, and indications for vaginal estrogen therapy.  Continue with vaginal estrogen therapy three times weekly.  Refill given    7. Chronic constipation  We discussed that the addition of MiraLAX to her regimen for constipation may help prevent further.  We also talked about using hot liquids and magnesium to augment her bowel control.  She was counseled that bowel function directly relates to bladder function.    Follow-up in 3 weeks                 Alma Delia Huff MD, FACOG    Female Pelvic Medicine and Reconstructive Surgery  Department of Obstetrics and Gynecology  [facility]        This medical record contains text that has been entered with the assistance of computer voice recognition and dictation software.  Therefore, it may contain unintended errors in text, spelling, punctuation, or grammar

## 2022-06-24 ENCOUNTER — GYNECOLOGY VISIT (OUTPATIENT)
Dept: OBGYN | Facility: CLINIC | Age: 77
End: 2022-06-24
Payer: MEDICARE

## 2022-06-24 VITALS
WEIGHT: 178 LBS | HEART RATE: 65 BPM | DIASTOLIC BLOOD PRESSURE: 72 MMHG | BODY MASS INDEX: 28.73 KG/M2 | SYSTOLIC BLOOD PRESSURE: 154 MMHG

## 2022-06-24 DIAGNOSIS — N81.2 INCOMPLETE UTEROVAGINAL PROLAPSE: ICD-10-CM

## 2022-06-24 DIAGNOSIS — N81.12 CYSTOCELE, LATERAL: ICD-10-CM

## 2022-06-24 DIAGNOSIS — N95.2 ATROPHIC VAGINITIS: ICD-10-CM

## 2022-06-24 DIAGNOSIS — Z46.89 ENCOUNTER FOR PESSARY MAINTENANCE: ICD-10-CM

## 2022-06-24 DIAGNOSIS — N81.6 RECTOCELE: ICD-10-CM

## 2022-06-24 PROCEDURE — 99213 OFFICE O/P EST LOW 20 MIN: CPT | Performed by: STUDENT IN AN ORGANIZED HEALTH CARE EDUCATION/TRAINING PROGRAM

## 2022-06-24 ASSESSMENT — FIBROSIS 4 INDEX: FIB4 SCORE: 1.9

## 2022-06-24 NOTE — PROGRESS NOTES
"Urogynecology and Pelvic Reconstructive Surgery Follow Up    Rosenda Cowan MRN:[id number] :1945    Referred by: Paulette Rosa    Reason for Visit:   No chief complaint on file.        Subjective     History of Presenting Illness:    Ms.Diane Miri Cowan is a 76 y.o. year old P2 who presents for pessary follow up.     She has significant erosion at last visit and the pessary was left out for holiday.  She was instructed to increase her vaginal estrogen in the interim.  She mentioned being adherent to her estrogen, and she is done very well, with some mild spotting a couple of days ago, but improved from before.  The prolapse has come back a little bit but not significantly.    Is still considering potential surgery, especially if she continues to have an erosion despite her pessary holidays and estrogen.  If she were to consider surgery would be sometime next year.    Intitial HPI: She was referred by Dr. Rosa for the conitnued management of prolapse and pessary complications.     She reports doing overall well, but with continued vaginal spotting.     At her last visit with Dr. Rosa she has some significant bleeding. #3 ring with support. Last exam note \"Excoriations extensively in posterior vault throughout and actively bleeding.  Silver nitrate used and hemostatic.  Copious premarin cream placed in back of vault.\"    She underwent hysterosocpy wrokup in 2019, benign findings.     Her bladder doesn't bother her very much    She has some constipation, for which she takes fiber.     She is using the estrace cream thee times per week.     Prior Pelvic surgery:   10/16/2019: Hysteroscopy, D&C, Dr. Salomon  Tubal ligation      Prior treatment:   Pessary - #3 ring with support       Pelvic floor symptom review:     Bladder:   Voids per day: 5 Voids per night: 1      Urinary incontinence episodes per day: none    Bladder emptying: Complete   Voiding symptoms: None   UTI in last 12 months: No   Other " urologic history: none      Prolapse:     Prolapse symptoms: Bulge, Exteriorized Tissue and Pelvic Pressure   Degree of prolapse: To Introitus        Bowel:    Constipation: Yes   Bowel movements per day: 1    Straining to empty bowels: No    Splinting to evacuate: No    Painful evacuation: No    Difficulty emptying rectum: No    Incontinence to stool: No   Incontinence to gas: No     Blood in stool: No    Hemorrhoids: No    Bowel conditions: none   Most recent colonoscopy: 2018       Sexual function:    Sexually active: Yes - although pessary does not get in the way due to partner issues (obesity)   Gender of partners: Male   Pain with intercourse: No       Pelvic Pain: No      Past medical and surgical history    Past obstetric history   Number of vaginal deliveries: 2   Number of  deliveries: 0   History of vacuum/forceps: Yes   History of obstetric anal sphincter injury: No     Past gynecological history:    Last menstrual period: No LMP recorded (lmp unknown). Patient is postmenopausal.   History of abnormal uterine bleeding: Yes   History of fibroids: No    History of endometrial polyps:  No    History of endometriosis: No    History of cervical dysplasia: No      Past medical history:  Past Medical History:   Diagnosis Date   • Hypertension      Past surgical history:  Past Surgical History:   Procedure Laterality Date   • [place] HYSTEROSCOPY,DX,SEP PROC N/A 10/16/2019    Procedure: HYSTEROSCOPY, DIAGNOSTIC;  Surgeon: Erin Gann M.D.;  Location: SURGERY SAME DAY [facility];  Service: Obstetrics   • DILATION AND CURETTAGE N/A 10/16/2019    Procedure: DILATION AND CURETTAGE;  Surgeon: Erin Gann M.D.;  Location: SURGERY SAME DAY [facility];  Service: Obstetrics   • APPENDECTOMY     • CHOLECYSTECTOMY     • TUBAL COAGULATION LAPAROSCOPIC BILATERAL       Medications:has a current medication list which includes the following prescription(s): estradiol, hydrochlorothiazide, losartan,  calcium 600+d3, multiple vitamins/womens, and lyrica.  Allergies:Patient has no known allergies.  Family history:  Family History   Problem Relation Age of Onset   • Hypertension Mother    • Heart Disease Father    • Hypertension Father    • Colon Cancer Father    • Other Sister         Intellectual Disability    • No Known Problems Daughter    • No Known Problems Daughter      Social history: reports that she has never smoked. She has never used smokeless tobacco. She reports current alcohol use. She reports that she does not use drugs.    Review of systems: A full review of systems was performed, and negative with the exception of want is noted above in the HPI.        Objective        LMP  (LMP Unknown)     Physical Exam  Vitals reviewed. Exam conducted with a chaperone present (MA - see notes.).   Constitutional:       Appearance: Normal appearance.   HENT:      Head: Normocephalic.      Mouth/Throat:      Mouth: Mucous membranes are moist.   Cardiovascular:      Rate and Rhythm: Normal rate.   Pulmonary:      Effort: Pulmonary effort is normal.   Abdominal:      Palpations: Abdomen is soft. There is no mass.      Tenderness: There is no abdominal tenderness.   Skin:     General: Skin is warm and dry.   Neurological:      Mental Status: She is alert.   Psychiatric:         Mood and Affect: Mood normal.         Genitourinary:    External female genitalia: WNL   Vulva: WNL   Bulbocavernosus reflex: Intact   Anal wink reflex: Intact   Perineal sensation: WNL   Urethra: Atrophic   Vagina: Erosion/ulceration has significantly improved, is now just a superficial and somewhat friable erosion.  This is treated with silver nitrate and direct application of vaginal estrogen.   Atrophy: Moderate   Cough stress test: Negative    Pelvic floor:    POP-Q: Appears to be a stage II anterior predominant with small rectocele.     Procedure Performed: Pessary placement.  After infection of the vagina with noted findings above, the  ring with support pessary was coated with Estrace cream and reinserted easily into the correct position.  This did position with various Valsalva maneuvers.    Diagnostic test and records review:           Assessment & Plan     Ms.Diane Miri Cowan is a 76 y.o. year old P2 with prolapse, significant vaginal erosions. We discussed my recommendations for further diagnosis and treatment at length today.     1. Incomplete uterovaginal prolapse  2. Cystocele, lateral  3. Rectocele  4. Vaginal erosion secondary to pessary use, initial encounter ([facility])  -Improved ulceration, persistent superficial erosion.  Pessary reinserted with instructions to continue vaginal estrogen 1 g 3 times per week.  -Order interval follow-up in 2 months for pessary check.  -Discussed again that since she has had this persistent erosion for some time, it may be better that we pursue surgical management for definitive treatment of her prolapse.  -I previously reviewed with her the options of obliterative, vaginal reconstructive, mesh augmented reconstructive approaches.  She is given printed information about all of these approaches and we can discuss this in more detail at her next visit.    5. Vaginal atrophy  She has vaginal atrophy on examination. Reviewed risks, benefits, and indications for vaginal estrogen therapy.  Continue with vaginal estrogen therapy three times weekly.      7. Chronic constipation  We previously discussed that the addition of MiraLAX to her regimen for constipation may help prevent further.  We also talked about using hot liquids and magnesium to augment her bowel control.  She was counseled that bowel function directly relates to bladder function.    Follow-up in 2 months                 Alma Delia Huff MD, FACOG    Female Pelvic Medicine and Reconstructive Surgery  Department of Obstetrics and Gynecology  [facility]        This medical record contains  text that has been entered with the assistance of computer voice recognition and dictation software.  Therefore, it may contain unintended errors in text, spelling, punctuation, or grammar

## 2022-08-25 NOTE — PROGRESS NOTES
"Urogynecology and Pelvic Reconstructive Surgery Follow Up    Rosenda Cowan MRN:[id number] :1945    Referred by: Paueltte Rosa    Reason for Visit:   Chief Complaint   Patient presents with    Other     Pessary Check         Subjective     History of Presenting Illness:    Ms.Diane Miri Cowan is a 76 y.o. year old P2 who presents for pessary follow up.     She has significant erosion at prior visits, s/p holiday, and this had healed and pessary replaced at last visit, she presents for interval follow up.  She reports feeling well, no vaginal bleeding, no discomfort.  She is using vaginal estrogen 3 times per week.    Printed materials on surgical therapy were given for review. If she were to consider surgery would be sometime next year.    Intitial HPI: She was referred by Dr. Rosa for the conitnued management of prolapse and pessary complications.     She reports doing overall well, but with continued vaginal spotting.     At her last visit with Dr. Rosa she has some significant bleeding. #3 ring with support. Last exam note \"Excoriations extensively in posterior vault throughout and actively bleeding.  Silver nitrate used and hemostatic.  Copious premarin cream placed in back of vault.\"    She underwent hysterosocpy wrokup in 2019, benign findings.     Her bladder doesn't bother her very much    She has some constipation, for which she takes fiber.     She is using the estrace cream thee times per week.     Prior Pelvic surgery:   10/16/2019: Hysteroscopy, D&C, Dr. Salomon  Tubal ligation      Prior treatment:   Pessary - #3 ring with support       Pelvic floor symptom review:     Bladder:   Voids per day: 5 Voids per night: 1      Urinary incontinence episodes per day: none    Bladder emptying: Complete   Voiding symptoms: None   UTI in last 12 months: No   Other urologic history: none      Prolapse:     Prolapse symptoms: Bulge, Exteriorized Tissue and Pelvic Pressure   Degree of prolapse: To " Introitus        Bowel:    Constipation: Yes   Bowel movements per day: 1    Straining to empty bowels: No    Splinting to evacuate: No    Painful evacuation: No    Difficulty emptying rectum: No    Incontinence to stool: No   Incontinence to gas: No     Blood in stool: No    Hemorrhoids: No    Bowel conditions: none   Most recent colonoscopy: 2018       Sexual function:    Sexually active: Yes - although pessary does not get in the way due to partner issues (obesity)   Gender of partners: Male   Pain with intercourse: No       Pelvic Pain: No      Past medical and surgical history    Past obstetric history   Number of vaginal deliveries: 2   Number of  deliveries: 0   History of vacuum/forceps: Yes   History of obstetric anal sphincter injury: No     Past gynecological history:    Last menstrual period: No LMP recorded (lmp unknown). Patient is postmenopausal.   History of abnormal uterine bleeding: Yes   History of fibroids: No    History of endometrial polyps:  No    History of endometriosis: No    History of cervical dysplasia: No      Past medical history:  Past Medical History:   Diagnosis Date    Hypertension      Past surgical history:  Past Surgical History:   Procedure Laterality Date    [place] HYSTEROSCOPY,DX,SEP PROC N/A 10/16/2019    Procedure: HYSTEROSCOPY, DIAGNOSTIC;  Surgeon: Erin Gann M.D.;  Location: SURGERY SAME DAY [facility];  Service: Obstetrics    DILATION AND CURETTAGE N/A 10/16/2019    Procedure: DILATION AND CURETTAGE;  Surgeon: Erin Gann M.D.;  Location: SURGERY SAME DAY [facility];  Service: Obstetrics    APPENDECTOMY      CHOLECYSTECTOMY      TUBAL COAGULATION LAPAROSCOPIC BILATERAL       Medications:has a current medication list which includes the following prescription(s): estradiol, hydrochlorothiazide, losartan, calcium 600+d3, multiple vitamins/womens, and lyrica.  Allergies:Patient has no known allergies.  Family history:  Family History   Problem  Relation Age of Onset    Hypertension Mother     Heart Disease Father     Hypertension Father     Colon Cancer Father     Other Sister         Intellectual Disability     No Known Problems Daughter     No Known Problems Daughter      Social history: reports that she has never smoked. She has never used smokeless tobacco. She reports current alcohol use. She reports that she does not use drugs.    Review of systems: A full review of systems was performed, and negative with the exception of want is noted above in the HPI.        Objective        /65 (BP Location: Right arm, Patient Position: Sitting, BP Cuff Size: Adult)   Pulse 75   Wt 80.3 kg (177 lb)   LMP  (LMP Unknown)   BMI 28.57 kg/m²     Physical Exam  Vitals reviewed. Exam conducted with a chaperone present (MA - see notes.).   Constitutional:       Appearance: Normal appearance.   HENT:      Head: Normocephalic.      Mouth/Throat:      Mouth: Mucous membranes are moist.   Cardiovascular:      Rate and Rhythm: Normal rate.   Pulmonary:      Effort: Pulmonary effort is normal.   Abdominal:      Palpations: Abdomen is soft. There is no mass.      Tenderness: no abdominal tenderness   Skin:     General: Skin is warm and dry.   Neurological:      Mental Status: She is alert.   Psychiatric:         Mood and Affect: Mood normal.       Genitourinary:    External female genitalia: WNL   Vulva: WNL   Bulbocavernosus reflex: Intact   Anal wink reflex: Intact   Perineal sensation: WNL   Urethra: Atrophic   Vagina: Erosion/ulceration has significantly improved, is now just a superficial and somewhat friable erosion.  This is treated with silver nitrate and direct application of vaginal estrogen.   Atrophy: Moderate   Cough stress test: Negative    Pelvic floor:    POP-Q: Appears to be a stage II anterior predominant with small rectocele.     Procedure Performed: Pessary check.  The ring support pessary was removed easily from the vagina and washed.  Sterile  speculum examination was performed and a deep and wide epithelial erosion was noted at the posterior vault, requiring silver nitrate to stop bleeding.  Vaginal estrogen applied liberally and the pessary was left out.     Diagnostic test and records review:           Assessment & Plan     Ms.Diane Miri Cowan is a 76 y.o. year old P2 with prolapse, significant vaginal erosions. We discussed my recommendations for further diagnosis and treatment at length today.     1. Incomplete uterovaginal prolapse  2. Cystocele, lateral  3. Rectocele  4. Vaginal erosion secondary to pessary use, initial encounter ([facility])  -Ulceration is once again worsened and the pessary was left out.  She will return in 4 weeks for reexamination and possible pessary reinsertion  -She strongly considering surgical therapy at this time, however would likely perform this in the next year, as the pessary complications are becoming tiresome.  Continue vaginal estrogen 3 times per week to aid in healing  -She was previously counseled on all obliterative, vaginal reconstructive, and mesh augmented reconstructive surgical approaches    5. Vaginal atrophy  She has vaginal atrophy on examination. Reviewed risks, benefits, and indications for vaginal estrogen therapy.  Continue with vaginal estrogen therapy three times weekly.      7. Chronic constipation  We previously discussed that the addition of MiraLAX to her regimen for constipation may help prevent further.  We also talked about using hot liquids and magnesium to augment her bowel control.  She was counseled that bowel function directly relates to bladder function.    Follow-up in 1 months                 Alma Delia Huff MD, FACOG    Female Pelvic Medicine and Reconstructive Surgery  Department of Obstetrics and Gynecology  [facility]        This medical record contains text that has been entered with the assistance of computer voice  recognition and dictation software.  Therefore, it may contain unintended errors in text, spelling, punctuation, or grammar

## 2022-08-26 ENCOUNTER — GYNECOLOGY VISIT (OUTPATIENT)
Dept: OBGYN | Facility: CLINIC | Age: 77
End: 2022-08-26
Payer: MEDICARE

## 2022-08-26 VITALS
SYSTOLIC BLOOD PRESSURE: 129 MMHG | DIASTOLIC BLOOD PRESSURE: 65 MMHG | HEART RATE: 75 BPM | WEIGHT: 177 LBS | BODY MASS INDEX: 28.57 KG/M2

## 2022-08-26 DIAGNOSIS — Z46.89 PESSARY MAINTENANCE: ICD-10-CM

## 2022-08-26 DIAGNOSIS — N89.8 VAGINAL EROSION SECONDARY TO PESSARY USE, SUBSEQUENT ENCOUNTER: ICD-10-CM

## 2022-08-26 DIAGNOSIS — N81.2 INCOMPLETE UTEROVAGINAL PROLAPSE: ICD-10-CM

## 2022-08-26 DIAGNOSIS — T83.89XD VAGINAL EROSION SECONDARY TO PESSARY USE, SUBSEQUENT ENCOUNTER: ICD-10-CM

## 2022-08-26 PROBLEM — T83.89XA VAGINAL EROSION SECONDARY TO PESSARY USE (HCC): Status: ACTIVE | Noted: 2022-08-26

## 2022-08-26 PROCEDURE — 99213 OFFICE O/P EST LOW 20 MIN: CPT | Performed by: STUDENT IN AN ORGANIZED HEALTH CARE EDUCATION/TRAINING PROGRAM

## 2022-08-26 ASSESSMENT — FIBROSIS 4 INDEX: FIB4 SCORE: 1.9

## 2022-09-30 ENCOUNTER — GYNECOLOGY VISIT (OUTPATIENT)
Dept: OBGYN | Facility: CLINIC | Age: 77
End: 2022-09-30
Payer: MEDICARE

## 2022-09-30 VITALS — DIASTOLIC BLOOD PRESSURE: 62 MMHG | HEART RATE: 76 BPM | SYSTOLIC BLOOD PRESSURE: 129 MMHG

## 2022-09-30 DIAGNOSIS — N81.2 INCOMPLETE UTEROVAGINAL PROLAPSE: ICD-10-CM

## 2022-09-30 DIAGNOSIS — N81.4 CYSTOCELE WITH UTERINE PROLAPSE: ICD-10-CM

## 2022-09-30 DIAGNOSIS — N89.8 VAGINAL EROSION SECONDARY TO PESSARY USE, SUBSEQUENT ENCOUNTER: ICD-10-CM

## 2022-09-30 DIAGNOSIS — Z46.89 PESSARY MAINTENANCE: ICD-10-CM

## 2022-09-30 DIAGNOSIS — T83.89XD VAGINAL EROSION SECONDARY TO PESSARY USE, SUBSEQUENT ENCOUNTER: ICD-10-CM

## 2022-09-30 PROCEDURE — 99213 OFFICE O/P EST LOW 20 MIN: CPT | Performed by: STUDENT IN AN ORGANIZED HEALTH CARE EDUCATION/TRAINING PROGRAM

## 2022-09-30 NOTE — PROGRESS NOTES
"Urogynecology and Pelvic Reconstructive Surgery Follow Up    Rosenda Cowan MRN:[id number] :1945    Referred by: Paulette Rosa    Reason for Visit:   No chief complaint on file.        Subjective     History of Presenting Illness:    Ms.Diane Miri Cowan is a 76 y.o. year old P2 who presents for pessary follow up.     She has significant erosion at prior visits, and pessary was left out at last visit to allow healing.  She notes no further bleeding, but her prolapse is also not returned to a bothersome level.  She is using vaginal estrogen 3 times per week as instructed.    Printed materials on surgical therapy were given previously for review. If she were to consider surgery would be sometime next year.    Intitial HPI: She was referred by Dr. Rosa for the conitnued management of prolapse and pessary complications.   She reports doing overall well, but with continued vaginal spotting.   At her last visit with Dr. Rosa she has some significant bleeding. #3 ring with support. Last exam note \"Excoriations extensively in posterior vault throughout and actively bleeding.  Silver nitrate used and hemostatic.  Copious premarin cream placed in back of vault.\"  She underwent hysterosocpy wrokup in 2019, benign findings.   Her bladder doesn't bother her very much  She has some constipation, for which she takes fiber.   She is using the estrace cream thee times per week.     Prior Pelvic surgery:   10/16/2019: Hysteroscopy, D&C, Dr. Salomon  Tubal ligation      Prior treatment:   Pessary - #3 ring with support       Pelvic floor symptom review:     Bladder:   Voids per day: 5 Voids per night: 1      Urinary incontinence episodes per day: none    Bladder emptying: Complete   Voiding symptoms: None   UTI in last 12 months: No   Other urologic history: none      Prolapse:     Prolapse symptoms: Bulge, Exteriorized Tissue and Pelvic Pressure   Degree of prolapse: To Introitus        Bowel:    Constipation: " Yes   Bowel movements per day: 1    Straining to empty bowels: No    Splinting to evacuate: No    Painful evacuation: No    Difficulty emptying rectum: No    Incontinence to stool: No   Incontinence to gas: No     Blood in stool: No    Hemorrhoids: No    Bowel conditions: none   Most recent colonoscopy: 2018       Sexual function:    Sexually active: Yes - although pessary does not get in the way due to partner issues (obesity)   Gender of partners: Male   Pain with intercourse: No       Pelvic Pain: No      Past medical and surgical history    Past obstetric history   Number of vaginal deliveries: 2   Number of  deliveries: 0   History of vacuum/forceps: Yes   History of obstetric anal sphincter injury: No     Past gynecological history:    Last menstrual period: No LMP recorded (lmp unknown). Patient is postmenopausal.   History of abnormal uterine bleeding: Yes   History of fibroids: No    History of endometrial polyps:  No    History of endometriosis: No    History of cervical dysplasia: No      Past medical history:  Past Medical History:   Diagnosis Date    Hypertension      Past surgical history:  Past Surgical History:   Procedure Laterality Date    [place] HYSTEROSCOPY,DX,SEP PROC N/A 10/16/2019    Procedure: HYSTEROSCOPY, DIAGNOSTIC;  Surgeon: Erin Gann M.D.;  Location: SURGERY SAME DAY [facility];  Service: Obstetrics    DILATION AND CURETTAGE N/A 10/16/2019    Procedure: DILATION AND CURETTAGE;  Surgeon: Erin Gann M.D.;  Location: SURGERY SAME DAY [facility];  Service: Obstetrics    APPENDECTOMY      CHOLECYSTECTOMY      TUBAL COAGULATION LAPAROSCOPIC BILATERAL       Medications:has a current medication list which includes the following prescription(s): estradiol, hydrochlorothiazide, losartan, calcium 600+d3, multiple vitamins/womens, and lyrica.  Allergies:Patient has no known allergies.  Family history:  Family History   Problem Relation Age of Onset    Hypertension  Mother     Heart Disease Father     Hypertension Father     Colon Cancer Father     Other Sister         Intellectual Disability     No Known Problems Daughter     No Known Problems Daughter      Social history: reports that she has never smoked. She has never used smokeless tobacco. She reports current alcohol use. She reports that she does not use drugs.    Review of systems: A full review of systems was performed, and negative with the exception of want is noted above in the HPI.        Objective        LMP  (LMP Unknown)     Physical Exam  Vitals reviewed. Exam conducted with a chaperone present (MA - see notes.).   Constitutional:       Appearance: Normal appearance.   HENT:      Head: Normocephalic.      Mouth/Throat:      Mouth: Mucous membranes are moist.   Cardiovascular:      Rate and Rhythm: Normal rate.   Pulmonary:      Effort: Pulmonary effort is normal.   Abdominal:      Palpations: Abdomen is soft. There is no mass.      Tenderness: There is no abdominal tenderness.   Skin:     General: Skin is warm and dry.   Neurological:      Mental Status: She is alert.   Psychiatric:         Mood and Affect: Mood normal.       Genitourinary:    External female genitalia: WNL   Vulva: WNL   Bulbocavernosus reflex: Intact   Anal wink reflex: Intact   Perineal sensation: WNL   Urethra: Atrophic   Vagina: Posterior cervical erosion/ulceration has significantly improved, but is still present.  Some friability with bleeding after a light pressure.  The area is directly treated with estrogen, as this is unlikely to be reached with her applicator.  Pessary not replaced.   Atrophy: Moderate   Cough stress test: Negative    Pelvic floor:    POP-Q: Appears to be a stage II anterior predominant with small rectocele.     Procedure Performed: None  Diagnostic test and records review:           Assessment & Plan     Ms.Diane Miri Cowan is a 76 y.o. year old P2 with prolapse, significant vaginal erosions.    1. Incomplete  uterovaginal prolapse  2. Cystocele, lateral  3. Rectocele  4. Vaginal erosion secondary to pessary use, initial encounter ([facility])  -Ulceration is improving but not yet completely healed.  I gave her the options of continued pessary holiday to ensure complete healing as to avoid future pessary complications, versus replacing the pessary today, but running the risk of worsening her ulceration again.  Since her prolapse has not worsened too much after the pessary has been out, she opts for a second pessary holiday of 4 weeks with continue vaginal estrogen use.  -She strongly considering surgical therapy at this time, however would likely perform this in the next year, as the pessary complications are becoming tiresome.  Continue vaginal estrogen 3 times per week to aid in healing  -She was previously counseled on all obliterative, vaginal reconstructive, and mesh augmented reconstructive surgical approaches    Follow-up in 1 month for reevaluation and possible pessary reinsertion    5. Vaginal atrophy  She has vaginal atrophy on examination. Reviewed risks, benefits, and indications for vaginal estrogen therapy.  Continue with vaginal estrogen therapy three times weekly.      7. Chronic constipation  We previously discussed that the addition of MiraLAX to her regimen for constipation may help prevent further.  We also talked about using hot liquids and magnesium to augment her bowel control.  She was counseled that bowel function directly relates to bladder function.    Follow-up in 1 months                 Alma Delia Huff MD, FACOG    Female Pelvic Medicine and Reconstructive Surgery  Department of Obstetrics and Gynecology  [facility]        This medical record contains text that has been entered with the assistance of computer voice recognition and dictation software.  Therefore, it may contain unintended errors in text, spelling, punctuation, or  grammar

## 2022-11-11 ENCOUNTER — PATIENT MESSAGE (OUTPATIENT)
Dept: HEALTH INFORMATION MANAGEMENT | Facility: OTHER | Age: 77
End: 2022-11-11

## 2022-11-20 SDOH — ECONOMIC STABILITY: INCOME INSECURITY: HOW HARD IS IT FOR YOU TO PAY FOR THE VERY BASICS LIKE FOOD, HOUSING, MEDICAL CARE, AND HEATING?: NOT HARD AT ALL

## 2022-11-20 SDOH — ECONOMIC STABILITY: TRANSPORTATION INSECURITY
IN THE PAST 12 MONTHS, HAS LACK OF RELIABLE TRANSPORTATION KEPT YOU FROM MEDICAL APPOINTMENTS, MEETINGS, WORK OR FROM GETTING THINGS NEEDED FOR DAILY LIVING?: NO

## 2022-11-20 SDOH — ECONOMIC STABILITY: INCOME INSECURITY: IN THE LAST 12 MONTHS, WAS THERE A TIME WHEN YOU WERE NOT ABLE TO PAY THE MORTGAGE OR RENT ON TIME?: NO

## 2022-11-20 SDOH — ECONOMIC STABILITY: TRANSPORTATION INSECURITY
IN THE PAST 12 MONTHS, HAS THE LACK OF TRANSPORTATION KEPT YOU FROM MEDICAL APPOINTMENTS OR FROM GETTING MEDICATIONS?: NO

## 2022-11-20 SDOH — ECONOMIC STABILITY: FOOD INSECURITY: WITHIN THE PAST 12 MONTHS, THE FOOD YOU BOUGHT JUST DIDN'T LAST AND YOU DIDN'T HAVE MONEY TO GET MORE.: NEVER TRUE

## 2022-11-20 SDOH — HEALTH STABILITY: MENTAL HEALTH
STRESS IS WHEN SOMEONE FEELS TENSE, NERVOUS, ANXIOUS, OR CAN'T SLEEP AT NIGHT BECAUSE THEIR MIND IS TROUBLED. HOW STRESSED ARE YOU?: TO SOME EXTENT

## 2022-11-20 SDOH — HEALTH STABILITY: PHYSICAL HEALTH: ON AVERAGE, HOW MANY MINUTES DO YOU ENGAGE IN EXERCISE AT THIS LEVEL?: 20 MIN

## 2022-11-20 SDOH — ECONOMIC STABILITY: HOUSING INSECURITY
IN THE LAST 12 MONTHS, WAS THERE A TIME WHEN YOU DID NOT HAVE A STEADY PLACE TO SLEEP OR SLEPT IN A SHELTER (INCLUDING NOW)?: NO

## 2022-11-20 SDOH — ECONOMIC STABILITY: HOUSING INSECURITY: IN THE LAST 12 MONTHS, HOW MANY PLACES HAVE YOU LIVED?: 1

## 2022-11-20 SDOH — ECONOMIC STABILITY: FOOD INSECURITY: WITHIN THE PAST 12 MONTHS, YOU WORRIED THAT YOUR FOOD WOULD RUN OUT BEFORE YOU GOT MONEY TO BUY MORE.: NEVER TRUE

## 2022-11-20 SDOH — HEALTH STABILITY: PHYSICAL HEALTH: ON AVERAGE, HOW MANY DAYS PER WEEK DO YOU ENGAGE IN MODERATE TO STRENUOUS EXERCISE (LIKE A BRISK WALK)?: 3 DAYS

## 2022-11-20 SDOH — ECONOMIC STABILITY: TRANSPORTATION INSECURITY
IN THE PAST 12 MONTHS, HAS LACK OF TRANSPORTATION KEPT YOU FROM MEETINGS, WORK, OR FROM GETTING THINGS NEEDED FOR DAILY LIVING?: NO

## 2022-11-20 ASSESSMENT — LIFESTYLE VARIABLES
HOW OFTEN DO YOU HAVE A DRINK CONTAINING ALCOHOL: 2-3 TIMES A WEEK
AUDIT-C TOTAL SCORE: 3
HOW OFTEN DO YOU HAVE SIX OR MORE DRINKS ON ONE OCCASION: NEVER
SKIP TO QUESTIONS 9-10: 1
HOW MANY STANDARD DRINKS CONTAINING ALCOHOL DO YOU HAVE ON A TYPICAL DAY: 1 OR 2

## 2022-11-20 ASSESSMENT — SOCIAL DETERMINANTS OF HEALTH (SDOH)
HOW OFTEN DO YOU HAVE SIX OR MORE DRINKS ON ONE OCCASION: NEVER
IN A TYPICAL WEEK, HOW MANY TIMES DO YOU TALK ON THE PHONE WITH FAMILY, FRIENDS, OR NEIGHBORS?: MORE THAN THREE TIMES A WEEK
IN A TYPICAL WEEK, HOW MANY TIMES DO YOU TALK ON THE PHONE WITH FAMILY, FRIENDS, OR NEIGHBORS?: MORE THAN THREE TIMES A WEEK
HOW HARD IS IT FOR YOU TO PAY FOR THE VERY BASICS LIKE FOOD, HOUSING, MEDICAL CARE, AND HEATING?: NOT HARD AT ALL
HOW OFTEN DO YOU GET TOGETHER WITH FRIENDS OR RELATIVES?: TWICE A WEEK
HOW OFTEN DO YOU ATTENT MEETINGS OF THE CLUB OR ORGANIZATION YOU BELONG TO?: NEVER
WITHIN THE PAST 12 MONTHS, YOU WORRIED THAT YOUR FOOD WOULD RUN OUT BEFORE YOU GOT THE MONEY TO BUY MORE: NEVER TRUE
HOW OFTEN DO YOU ATTEND CHURCH OR RELIGIOUS SERVICES?: NEVER
HOW OFTEN DO YOU HAVE A DRINK CONTAINING ALCOHOL: 2-3 TIMES A WEEK
DO YOU BELONG TO ANY CLUBS OR ORGANIZATIONS SUCH AS CHURCH GROUPS UNIONS, FRATERNAL OR ATHLETIC GROUPS, OR SCHOOL GROUPS?: NO
HOW MANY DRINKS CONTAINING ALCOHOL DO YOU HAVE ON A TYPICAL DAY WHEN YOU ARE DRINKING: 1 OR 2
HOW OFTEN DO YOU GET TOGETHER WITH FRIENDS OR RELATIVES?: TWICE A WEEK
DO YOU BELONG TO ANY CLUBS OR ORGANIZATIONS SUCH AS CHURCH GROUPS UNIONS, FRATERNAL OR ATHLETIC GROUPS, OR SCHOOL GROUPS?: NO
HOW OFTEN DO YOU ATTENT MEETINGS OF THE CLUB OR ORGANIZATION YOU BELONG TO?: NEVER
HOW OFTEN DO YOU ATTEND CHURCH OR RELIGIOUS SERVICES?: NEVER

## 2022-11-21 ENCOUNTER — GYNECOLOGY VISIT (OUTPATIENT)
Dept: OBGYN | Facility: CLINIC | Age: 77
End: 2022-11-21
Payer: MEDICARE

## 2022-11-21 ENCOUNTER — OFFICE VISIT (OUTPATIENT)
Dept: MEDICAL GROUP | Facility: MEDICAL CENTER | Age: 77
End: 2022-11-21
Payer: MEDICARE

## 2022-11-21 VITALS
DIASTOLIC BLOOD PRESSURE: 75 MMHG | HEART RATE: 77 BPM | SYSTOLIC BLOOD PRESSURE: 151 MMHG | WEIGHT: 178 LBS | BODY MASS INDEX: 27.47 KG/M2

## 2022-11-21 VITALS
HEIGHT: 68 IN | WEIGHT: 178.6 LBS | HEART RATE: 87 BPM | SYSTOLIC BLOOD PRESSURE: 114 MMHG | DIASTOLIC BLOOD PRESSURE: 68 MMHG | TEMPERATURE: 99.2 F | BODY MASS INDEX: 27.07 KG/M2 | OXYGEN SATURATION: 95 %

## 2022-11-21 DIAGNOSIS — N95.2 ATROPHIC VAGINITIS: ICD-10-CM

## 2022-11-21 DIAGNOSIS — N81.2 INCOMPLETE UTEROVAGINAL PROLAPSE: ICD-10-CM

## 2022-11-21 DIAGNOSIS — B02.29 POST HERPETIC NEURALGIA: ICD-10-CM

## 2022-11-21 DIAGNOSIS — Z12.31 ENCOUNTER FOR SCREENING MAMMOGRAM FOR MALIGNANT NEOPLASM OF BREAST: ICD-10-CM

## 2022-11-21 DIAGNOSIS — Z23 NEED FOR VACCINATION: ICD-10-CM

## 2022-11-21 DIAGNOSIS — N89.8 VAGINAL EROSION SECONDARY TO PESSARY USE, SUBSEQUENT ENCOUNTER: ICD-10-CM

## 2022-11-21 DIAGNOSIS — I10 ESSENTIAL HYPERTENSION: ICD-10-CM

## 2022-11-21 DIAGNOSIS — E78.00 ELEVATED LDL CHOLESTEROL LEVEL: ICD-10-CM

## 2022-11-21 DIAGNOSIS — N95.0 POSTMENOPAUSAL BLEEDING: ICD-10-CM

## 2022-11-21 DIAGNOSIS — T83.89XD VAGINAL EROSION SECONDARY TO PESSARY USE, SUBSEQUENT ENCOUNTER: ICD-10-CM

## 2022-11-21 PROCEDURE — 99214 OFFICE O/P EST MOD 30 MIN: CPT | Mod: 25 | Performed by: FAMILY MEDICINE

## 2022-11-21 PROCEDURE — G0008 ADMIN INFLUENZA VIRUS VAC: HCPCS | Performed by: FAMILY MEDICINE

## 2022-11-21 PROCEDURE — 99213 OFFICE O/P EST LOW 20 MIN: CPT | Performed by: STUDENT IN AN ORGANIZED HEALTH CARE EDUCATION/TRAINING PROGRAM

## 2022-11-21 PROCEDURE — 90662 IIV NO PRSV INCREASED AG IM: CPT | Performed by: FAMILY MEDICINE

## 2022-11-21 ASSESSMENT — ENCOUNTER SYMPTOMS
FALLS: 0
WEIGHT LOSS: 0
SHORTNESS OF BREATH: 0
ABDOMINAL PAIN: 0
DIZZINESS: 0

## 2022-11-21 ASSESSMENT — PATIENT HEALTH QUESTIONNAIRE - PHQ9: CLINICAL INTERPRETATION OF PHQ2 SCORE: 0

## 2022-11-21 ASSESSMENT — FIBROSIS 4 INDEX
FIB4 SCORE: 1.9
FIB4 SCORE: 1.9

## 2022-11-21 NOTE — ASSESSMENT & PLAN NOTE
This is a chronic condition, controlled.  Blood pressure is at goal under 140/90 in the office today.  We will continue the current regimen.  - Losartan 100 mg PO QD

## 2022-11-21 NOTE — PROGRESS NOTES
"Urogynecology and Pelvic Reconstructive Surgery Follow Up    Rosenda Cowan MRN:[id number] :1945    Referred by: Paulette Rosa    Reason for Visit:   No chief complaint on file.        Subjective     History of Presenting Illness:    Ms.Diane Miri Cowan is a 76 y.o. year old P2 who presents for pessary follow up.     She is not s/p extended pessary holiday for persistent erosion. She is using vaginal estrogen 3 times per week as instructed.  She notes that despite the extended pessary holiday her prolapse does not become very bothersome or interfering with her activities of daily living.    She last discussed wanting surgical correction in the new year     Intitial HPI: She was referred by Dr. Rosa for the conitnued management of prolapse and pessary complications.   She reports doing overall well, but with continued vaginal spotting.   At her last visit with Dr. Rosa she has some significant bleeding. #3 ring with support. Last exam note \"Excoriations extensively in posterior vault throughout and actively bleeding.  Silver nitrate used and hemostatic.  Copious premarin cream placed in back of vault.\"  She underwent hysterosocpy wrokup in 2019, benign findings.   Her bladder doesn't bother her very much  She has some constipation, for which she takes fiber.   She is using the estrace cream thee times per week.     Prior Pelvic surgery:   10/16/2019: Hysteroscopy, D&C, Dr. Salomon  Tubal ligation      Prior treatment:   Pessary - #3 ring with support       Pelvic floor symptom review:     Bladder:   Voids per day: 5 Voids per night: 1      Urinary incontinence episodes per day: none    Bladder emptying: Complete   Voiding symptoms: None   UTI in last 12 months: No   Other urologic history: none      Prolapse:     Prolapse symptoms: Bulge, Exteriorized Tissue and Pelvic Pressure   Degree of prolapse: To Introitus        Bowel:    Constipation: Yes   Bowel movements per day: 1    Straining to empty " bowels: No    Splinting to evacuate: No    Painful evacuation: No    Difficulty emptying rectum: No    Incontinence to stool: No   Incontinence to gas: No     Blood in stool: No    Hemorrhoids: No    Bowel conditions: none   Most recent colonoscopy: 2018       Sexual function:    Sexually active: Yes - although pessary does not get in the way due to partner issues (obesity)   Gender of partners: Male   Pain with intercourse: No       Pelvic Pain: No      Past medical and surgical history    Past obstetric history   Number of vaginal deliveries: 2   Number of  deliveries: 0   History of vacuum/forceps: Yes   History of obstetric anal sphincter injury: No     Past gynecological history:    Last menstrual period: No LMP recorded (lmp unknown). Patient is postmenopausal.   History of abnormal uterine bleeding: Yes   History of fibroids: No    History of endometrial polyps:  No    History of endometriosis: No    History of cervical dysplasia: No      Past medical history:  Past Medical History:   Diagnosis Date    Hypertension      Past surgical history:  Past Surgical History:   Procedure Laterality Date    [place] HYSTEROSCOPY,DX,SEP PROC N/A 10/16/2019    Procedure: HYSTEROSCOPY, DIAGNOSTIC;  Surgeon: Erin Gann M.D.;  Location: SURGERY SAME DAY [facility];  Service: Obstetrics    DILATION AND CURETTAGE N/A 10/16/2019    Procedure: DILATION AND CURETTAGE;  Surgeon: Erin Gann M.D.;  Location: SURGERY SAME DAY [facility];  Service: Obstetrics    APPENDECTOMY      CHOLECYSTECTOMY      TUBAL COAGULATION LAPAROSCOPIC BILATERAL       Medications:has a current medication list which includes the following prescription(s): estradiol, hydrochlorothiazide, losartan, calcium 600+d3, multiple vitamins/womens, and lyrica.  Allergies:Patient has no known allergies.  Family history:  Family History   Problem Relation Age of Onset    Hypertension Mother     Heart Disease Father     Hypertension Father      Colon Cancer Father     Other Sister         Intellectual Disability     No Known Problems Daughter     No Known Problems Daughter      Social history: reports that she has never smoked. She has never used smokeless tobacco. She reports current alcohol use. She reports that she does not use drugs.    Review of systems: A full review of systems was performed, and negative with the exception of want is noted above in the HPI.        Objective        LMP  (LMP Unknown)     Physical Exam  Vitals reviewed. Exam conducted with a chaperone present (MA - see notes.).   Constitutional:       Appearance: Normal appearance.   HENT:      Head: Normocephalic.      Mouth/Throat:      Mouth: Mucous membranes are moist.   Cardiovascular:      Rate and Rhythm: Normal rate.   Pulmonary:      Effort: Pulmonary effort is normal.   Abdominal:      Palpations: Abdomen is soft. There is no mass.      Tenderness: There is no abdominal tenderness.   Skin:     General: Skin is warm and dry.   Neurological:      Mental Status: She is alert.   Psychiatric:         Mood and Affect: Mood normal.       Genitourinary:    External female genitalia: WNL   Vulva: WNL   Bulbocavernosus reflex: Intact   Anal wink reflex: Intact   Perineal sensation: WNL   Urethra: Atrophic   Vagina: Posterior cervical erosion/ulceration has significantly improved, but is still present.  Some friability with bleeding after a light pressure.  The area is directly treated with estrogen, as this is unlikely to be reached with her applicator.  Pessary not replaced.   Atrophy: Moderate   Cough stress test: Negative    Pelvic floor:    POP-Q (today): AA -1 /BA -1 / AP -1 /Bp -1     Procedure Performed: None  Diagnostic test and records review:           Assessment & Plan     Ms.Diane Miri Cowan is a 76 y.o. year old P2 with prolapse, significant vaginal erosions.    Incomplete uterovaginal prolapse  Cystocele, lateral  Rectocele  Vaginal erosion secondary to pessary use,  initial encounter ([facility])  -Despite extended pessary holiday and estrogen therapy, her posterior cervical erosion has persisted and is still friable today on exam with some bleeding.  -She has not noted much change in her symptoms with pessary out, so I counseled her that if prolapse is not bothersome she does not need to continue pessary at this time, but can present back if this starts to bother her in the future.  -Due to persistent bleeding despite extended pessary holiday with estrogen use, and history of prior endometrial polyp, I recommend follow-up pelvic ultrasound to evaluate the endometrial stripe.  If this is normal no further work-up is necessary.  However if any thickening or polypoid material we could move down this treatment pathway.    Postmenopausal bleeding  -Follow-up pelvic ultrasound ordered due to history of polyp (status post D&C), and persistent vaginal bleeding despite long pessary holiday.      Vaginal atrophy  She has vaginal atrophy on examination. Reviewed risks, benefits, and indications for vaginal estrogen therapy.  Continue with vaginal estrogen therapy three times weekly.      Chronic constipation  We previously discussed that the addition of MiraLAX to her regimen for constipation may help prevent further.  We also talked about using hot liquids and magnesium to augment her bowel control.  She was counseled that bowel function directly relates to bladder function.                     Alma Delia Huff MD, FACOG    Female Pelvic Medicine and Reconstructive Surgery  Department of Obstetrics and Gynecology  [facility]        This medical record contains text that has been entered with the assistance of computer voice recognition and dictation software.  Therefore, it may contain unintended errors in text, spelling, punctuation, or grammar

## 2022-11-21 NOTE — PROGRESS NOTES
Subjective:     CC:  Diagnoses of Need for vaccination, Essential hypertension, Elevated LDL cholesterol level, Encounter for screening mammogram for malignant neoplasm of breast, and Post herpetic neuralgia were pertinent to this visit.    HISTORY OF THE PRESENT ILLNESS: Patient is a 76 y.o. female. This pleasant patient is here today to establish care and discuss BP control.     Post-herpetic neuropathic pain  Had shingles years ago  Pain on left flank ~T5-T7  Can drive up blood pressure when it gets bad  Lyrica for pain that is helpful  Lidocaine patches PRN when pain is bad    Sees OB/GYN for pessary 2/2 uterine prolapse  Every 3 month visit and BP has been in a good range at those visits    Blood pressure seems to be in good control in general  Losartan 100 mg PO QD  Home 's over 60's  Had previously been on HCTZ as well but was found not to be necessary       Problem   Elevated Ldl Cholesterol Level   Post Herpetic Neuralgia   Essential Hypertension       Current Outpatient Medications Ordered in Epic   Medication Sig Dispense Refill    estradiol (ESTRACE) 0.1 MG/GM vaginal cream Insert 0.5 g into the vagina Every Sunday and Wednesday. 42.5 g 5    losartan (COZAAR) 100 MG Tab Take 1 Tablet by mouth every day. 90 Tablet 3    Calcium Carb-Cholecalciferol (CALCIUM 600+D3) 600-800 MG-UNIT Tab Take  by mouth.      Multiple Vitamins-Minerals (MULTIPLE VITAMINS/WOMENS) Tab Take  by mouth.      LYRICA 150 MG Cap Take 1 Cap by mouth 3 times a day.       No current [facility]-ordered facility-administered medications on file.       Health Maintenance: HD Influenza vaccine, order for mammogram, patient would like this to be last one    ROS:   Review of Systems   Constitutional:  Negative for weight loss.   Respiratory:  Negative for shortness of breath.    Cardiovascular:  Negative for chest pain.   Gastrointestinal:  Negative for abdominal pain.   Musculoskeletal:  Negative for falls.   Neurological:  Negative for  "dizziness.       Objective:       Exam: /68   Pulse 87   Temp 37.3 °C (99.2 °F) (Temporal)   Ht 1.715 m (5' 7.5\")   Wt 81 kg (178 lb 9.6 oz)   SpO2 95%  Body mass index is 27.56 kg/m².    Physical Exam  Vitals reviewed.   Constitutional:       General: She is not in acute distress.     Appearance: Normal appearance.   HENT:      Head: Normocephalic and atraumatic.   Cardiovascular:      Rate and Rhythm: Normal rate and regular rhythm.      Heart sounds: Normal heart sounds.   Pulmonary:      Effort: Pulmonary effort is normal.      Breath sounds: Normal breath sounds.   Skin:     General: Skin is warm and dry.   Neurological:      Mental Status: She is alert. Mental status is at baseline.   Psychiatric:         Mood and Affect: Mood normal.         Behavior: Behavior normal.         Assessment & Plan:   76 y.o. female with the following -    Problem List Items Addressed This Visit       Post herpetic neuralgia     Still has left side pain due to previous shingles infection  Pregabalin seems to work well in general  Will continue with current dose of 150 mg TID         Essential hypertension     This is a chronic condition, controlled.  Blood pressure is at goal under 140/90 in the office today.  We will continue the current regimen.  - Losartan 100 mg PO QD         Relevant Orders    Comp Metabolic Panel    Elevated LDL cholesterol level     LDL has been creeping up a bit  Patient would prefer not to be on a statin  Will recheck and see what trend is  Current ASCVD risk is calculated at 25.7% however she is 4 days from 77th birthday and shared decision making is important as well         Relevant Orders    Lipid Profile     Other Visit Diagnoses       Need for vaccination        Encounter for screening mammogram for malignant neoplasm of breast        Relevant Orders    MA-SCREENING MAMMO BILAT W/TOMOSYNTHESIS W/CAD              Return in about 9 months (around 8/21/2023), or if symptoms worsen or fail to " improve, for Lifestyle, Lab F/U, HTN F/U.    Please note that this dictation was created using voice recognition software. I have made every reasonable attempt to correct obvious errors, but I expect that there are errors of grammar and possibly content that I did not discover before finalizing the note.

## 2022-11-21 NOTE — ASSESSMENT & PLAN NOTE
Still has left side pain due to previous shingles infection  Pregabalin seems to work well in general  Will continue with current dose of 150 mg TID

## 2022-11-21 NOTE — ASSESSMENT & PLAN NOTE
LDL has been creeping up a bit  Patient would prefer not to be on a statin  Will recheck and see what trend is  Current ASCVD risk is calculated at 25.7% however she is 4 days from 77th birthday and shared decision making is important as well

## 2022-12-05 DIAGNOSIS — I10 ESSENTIAL HYPERTENSION: ICD-10-CM

## 2022-12-05 RX ORDER — LOSARTAN POTASSIUM 100 MG/1
100 TABLET ORAL DAILY
Qty: 90 TABLET | Refills: 3 | Status: SHIPPED | OUTPATIENT
Start: 2022-12-05 | End: 2023-11-13 | Stop reason: SDUPTHER

## 2022-12-06 ENCOUNTER — HOSPITAL ENCOUNTER (OUTPATIENT)
Dept: RADIOLOGY | Facility: MEDICAL CENTER | Age: 77
End: 2022-12-06
Payer: MEDICARE

## 2022-12-29 ENCOUNTER — HOSPITAL ENCOUNTER (OUTPATIENT)
Dept: RADIOLOGY | Facility: MEDICAL CENTER | Age: 77
End: 2022-12-29
Attending: FAMILY MEDICINE
Payer: MEDICARE

## 2022-12-29 ENCOUNTER — HOSPITAL ENCOUNTER (OUTPATIENT)
Dept: RADIOLOGY | Facility: MEDICAL CENTER | Age: 77
End: 2022-12-29
Attending: STUDENT IN AN ORGANIZED HEALTH CARE EDUCATION/TRAINING PROGRAM
Payer: MEDICARE

## 2022-12-29 DIAGNOSIS — N95.0 POSTMENOPAUSAL BLEEDING: ICD-10-CM

## 2022-12-29 DIAGNOSIS — Z12.31 ENCOUNTER FOR SCREENING MAMMOGRAM FOR MALIGNANT NEOPLASM OF BREAST: ICD-10-CM

## 2022-12-29 PROCEDURE — 77063 BREAST TOMOSYNTHESIS BI: CPT

## 2022-12-29 PROCEDURE — 76830 TRANSVAGINAL US NON-OB: CPT

## 2022-12-30 DIAGNOSIS — R92.8 ABNORMAL MAMMOGRAM OF RIGHT BREAST: ICD-10-CM

## 2023-01-12 ENCOUNTER — HOSPITAL ENCOUNTER (OUTPATIENT)
Dept: RADIOLOGY | Facility: MEDICAL CENTER | Age: 78
End: 2023-01-12
Attending: FAMILY MEDICINE
Payer: MEDICARE

## 2023-01-12 DIAGNOSIS — R92.8 ABNORMAL MAMMOGRAM OF RIGHT BREAST: ICD-10-CM

## 2023-01-12 PROCEDURE — 76642 ULTRASOUND BREAST LIMITED: CPT | Mod: RT

## 2023-01-12 PROCEDURE — G0279 TOMOSYNTHESIS, MAMMO: HCPCS

## 2023-05-08 ENCOUNTER — GYNECOLOGY VISIT (OUTPATIENT)
Dept: OBGYN | Facility: CLINIC | Age: 78
End: 2023-05-08
Payer: MEDICARE

## 2023-05-08 VITALS
DIASTOLIC BLOOD PRESSURE: 72 MMHG | BODY MASS INDEX: 27.31 KG/M2 | WEIGHT: 177 LBS | SYSTOLIC BLOOD PRESSURE: 148 MMHG | HEART RATE: 74 BPM

## 2023-05-08 DIAGNOSIS — T83.89XD VAGINAL EROSION SECONDARY TO PESSARY USE, SUBSEQUENT ENCOUNTER: ICD-10-CM

## 2023-05-08 DIAGNOSIS — N95.2 ATROPHIC VAGINITIS: ICD-10-CM

## 2023-05-08 DIAGNOSIS — N89.8 VAGINAL EROSION SECONDARY TO PESSARY USE, SUBSEQUENT ENCOUNTER: ICD-10-CM

## 2023-05-08 PROCEDURE — 99213 OFFICE O/P EST LOW 20 MIN: CPT | Performed by: STUDENT IN AN ORGANIZED HEALTH CARE EDUCATION/TRAINING PROGRAM

## 2023-05-08 NOTE — PROGRESS NOTES
"Urogynecology and Pelvic Reconstructive Surgery Follow Up    Rosenda Cowan MRN:[id number] :1945    Referred by: Paulette Rosa    Reason for Visit:   No chief complaint on file.        Subjective     History of Presenting Illness:    Ms.Diane Miri Cowan is a 77 y.o. year old P2 who presents for pessary follow up.     She is not s/p extended pessary holiday for persistent erosion, without much prolapse recurrence. She is using vaginal estrogen 3 times per week as instructed, but stopped 2 weeks ago due to travel. Noo further bleeding.       She last discussed wanting surgical correction in the new year     Intitial HPI: She was referred by Dr. Rosa for the conitnued management of prolapse and pessary complications.   She reports doing overall well, but with continued vaginal spotting.   At her last visit with Dr. Rosa she has some significant bleeding. #3 ring with support. Last exam note \"Excoriations extensively in posterior vault throughout and actively bleeding.  Silver nitrate used and hemostatic.  Copious premarin cream placed in back of vault.\"  She underwent hysterosocpy wrokup in 2019, benign findings.   Her bladder doesn't bother her very much  She has some constipation, for which she takes fiber.   She is using the estrace cream thee times per week.     Prior Pelvic surgery:   10/16/2019: Hysteroscopy, D&C, Dr. Salomon  Tubal ligation      Prior treatment:   Pessary - #3 ring with support       Pelvic floor symptom review:     Bladder:   Voids per day: 5 Voids per night: 1      Urinary incontinence episodes per day: none    Bladder emptying: Complete   Voiding symptoms: None   UTI in last 12 months: No   Other urologic history: none      Prolapse:     Prolapse symptoms: Bulge, Exteriorized Tissue and Pelvic Pressure   Degree of prolapse: To Introitus        Bowel:    Constipation: Yes   Bowel movements per day: 1    Straining to empty bowels: No    Splinting to evacuate: No    Painful " evacuation: No    Difficulty emptying rectum: No    Incontinence to stool: No   Incontinence to gas: No     Blood in stool: No    Hemorrhoids: No    Bowel conditions: none   Most recent colonoscopy: 2018       Sexual function:    Sexually active: Yes - although pessary does not get in the way due to partner issues (obesity)   Gender of partners: Male   Pain with intercourse: No       Pelvic Pain: No      Past medical and surgical history    Past obstetric history   Number of vaginal deliveries: 2   Number of  deliveries: 0   History of vacuum/forceps: Yes   History of obstetric anal sphincter injury: No     Past gynecological history:    Last menstrual period: No LMP recorded (lmp unknown). Patient is postmenopausal.   History of abnormal uterine bleeding: Yes   History of fibroids: No    History of endometrial polyps:  No    History of endometriosis: No    History of cervical dysplasia: No      Past medical history:  Past Medical History:   Diagnosis Date    Hypertension      Past surgical history:  Past Surgical History:   Procedure Laterality Date    [place] HYSTEROSCOPY,DX,SEP PROC N/A 10/16/2019    Procedure: HYSTEROSCOPY, DIAGNOSTIC;  Surgeon: Erin Gann M.D.;  Location: SURGERY SAME DAY [facility];  Service: Obstetrics    DILATION AND CURETTAGE N/A 10/16/2019    Procedure: DILATION AND CURETTAGE;  Surgeon: Erin Gann M.D.;  [facility];  Service: Obstetrics    APPENDECTOMY      CHOLECYSTECTOMY      TUBAL COAGULATION LAPAROSCOPIC BILATERAL       Medications:has a current medication list which includes the following prescription(s): losartan, estradiol, calcium 600+d3, multiple vitamins/womens, and lyrica.  Allergies:Patient has no known allergies.  Family history:  Family History   Problem Relation Age of Onset    Hypertension Mother     Heart Disease Father     Hypertension Father     Colon Cancer Father     Other Sister         Intellectual Disability      No Known Problems Daughter     No Known Problems Daughter      Social history: reports that she has never smoked. She has never used smokeless tobacco. She reports current alcohol use. She reports that she does not use drugs.    Review of systems: A full review of systems was performed, and negative with the exception of want is noted above in the HPI.        Objective        LMP  (LMP Unknown)     Physical Exam  Vitals reviewed. Exam conducted with a chaperone present (MA - see notes.).   Constitutional:       Appearance: Normal appearance.   HENT:      Head: Normocephalic.      Mouth/Throat:      Mouth: Mucous membranes are moist.   Cardiovascular:      Rate and Rhythm: Normal rate.   Pulmonary:      Effort: Pulmonary effort is normal.   Abdominal:      Palpations: Abdomen is soft. There is no mass.      Tenderness: There is no abdominal tenderness.   Skin:     General: Skin is warm and dry.   Neurological:      Mental Status: She is alert.   Psychiatric:         Mood and Affect: Mood normal.       Genitourinary:    External female genitalia: WNL   Vulva: WNL   Bulbocavernosus reflex: Intact   Anal wink reflex: Intact   Perineal sensation: WNL   Urethra: Atrophic   Vagina: Posterior cervical erosion/ulceration has completely healed Pessary not replaced.   Atrophy: Moderate   Cough stress test: Negative    Pelvic floor:    POP-Q (today): AA -1 /BA -1 / AP -1 /Bp -1     Procedure Performed: None    Diagnostic test and records review:           Assessment & Plan     Ms.Diane Miri Cowan is a 77 y.o. year old P2 with prolapse, significant vaginal erosions.    Incomplete uterovaginal prolapse  Cystocele, lateral  Rectocele  Vaginal erosion secondary to pessary use, initial encounter ([facility])  -Completely healed erosion  -She has not noted much change in her symptoms with pessary out, so I counseled her that if prolapse is not bothersome she does not need to continue pessary at this time, but can present back if this  starts to bother her in the future. Pessary left out  -follow up if prolapse symptoms recur    Postmenopausal bleeding  - resolved s/p pessary holiday. S/p pelvic US, ems .45cm      Vaginal atrophy  She has vaginal atrophy on examination. Reviewed risks, benefits, and indications for vaginal estrogen therapy.  Continue with vaginal estrogen therapy once per week    Chronic constipation  We previously discussed that the addition of MiraLAX to her regimen for constipation may help prevent further.  We also talked about using hot liquids and magnesium to augment her bowel control.  She was counseled that bowel function directly relates to bladder function.                     Alma Delia Huff MD, FACOG    Female Pelvic Medicine and Reconstructive Surgery  Department of Obstetrics and Gynecology  [facility]        This medical record contains text that has been entered with the assistance of computer voice recognition and dictation software.  Therefore, it may contain unintended errors in text, spelling, punctuation, or grammar

## 2023-06-12 DIAGNOSIS — R92.8 ABNORMAL MAMMOGRAM OF RIGHT BREAST: ICD-10-CM

## 2023-07-07 ENCOUNTER — HOSPITAL ENCOUNTER (OUTPATIENT)
Dept: RADIOLOGY | Facility: MEDICAL CENTER | Age: 78
End: 2023-07-07
Attending: FAMILY MEDICINE
Payer: MEDICARE

## 2023-07-07 DIAGNOSIS — R92.8 ABNORMAL MAMMOGRAM OF RIGHT BREAST: ICD-10-CM

## 2023-07-07 PROCEDURE — 76642 ULTRASOUND BREAST LIMITED: CPT | Mod: RT

## 2023-07-07 PROCEDURE — G0279 TOMOSYNTHESIS, MAMMO: HCPCS

## 2023-10-23 ENCOUNTER — HOSPITAL ENCOUNTER (OUTPATIENT)
Dept: LAB | Facility: MEDICAL CENTER | Age: 78
End: 2023-10-23
Attending: FAMILY MEDICINE
Payer: MEDICARE

## 2023-10-23 DIAGNOSIS — I10 ESSENTIAL HYPERTENSION: ICD-10-CM

## 2023-10-23 DIAGNOSIS — E78.00 ELEVATED LDL CHOLESTEROL LEVEL: ICD-10-CM

## 2023-10-23 LAB
ALBUMIN SERPL BCP-MCNC: 3.9 G/DL (ref 3.2–4.9)
ALBUMIN/GLOB SERPL: 1 G/DL
ALP SERPL-CCNC: 96 U/L (ref 30–99)
ALT SERPL-CCNC: 21 U/L (ref 2–50)
ANION GAP SERPL CALC-SCNC: 9 MMOL/L (ref 7–16)
AST SERPL-CCNC: 30 U/L (ref 12–45)
BILIRUB SERPL-MCNC: 0.5 MG/DL (ref 0.1–1.5)
BUN SERPL-MCNC: 16 MG/DL (ref 8–22)
CALCIUM ALBUM COR SERPL-MCNC: 9.9 MG/DL (ref 8.5–10.5)
CALCIUM SERPL-MCNC: 9.8 MG/DL (ref 8.5–10.5)
CHLORIDE SERPL-SCNC: 107 MMOL/L (ref 96–112)
CHOLEST SERPL-MCNC: 214 MG/DL (ref 100–199)
CO2 SERPL-SCNC: 27 MMOL/L (ref 20–33)
CREAT SERPL-MCNC: 0.89 MG/DL (ref 0.5–1.4)
FASTING STATUS PATIENT QL REPORTED: NORMAL
GFR SERPLBLD CREATININE-BSD FMLA CKD-EPI: 67 ML/MIN/1.73 M 2
GLOBULIN SER CALC-MCNC: 3.9 G/DL (ref 1.9–3.5)
GLUCOSE SERPL-MCNC: 87 MG/DL (ref 65–99)
HDLC SERPL-MCNC: 54 MG/DL
LDLC SERPL CALC-MCNC: 144 MG/DL
POTASSIUM SERPL-SCNC: 4.8 MMOL/L (ref 3.6–5.5)
PROT SERPL-MCNC: 7.8 G/DL (ref 6–8.2)
SODIUM SERPL-SCNC: 143 MMOL/L (ref 135–145)
TRIGL SERPL-MCNC: 79 MG/DL (ref 0–149)

## 2023-10-23 PROCEDURE — 80053 COMPREHEN METABOLIC PANEL: CPT

## 2023-10-23 PROCEDURE — 36415 COLL VENOUS BLD VENIPUNCTURE: CPT

## 2023-10-23 PROCEDURE — 80061 LIPID PANEL: CPT

## 2023-10-25 ENCOUNTER — TELEPHONE (OUTPATIENT)
Dept: MEDICAL GROUP | Facility: MEDICAL CENTER | Age: 78
End: 2023-10-25
Payer: MEDICARE

## 2023-10-25 DIAGNOSIS — Z78.0 POSTMENOPAUSAL: ICD-10-CM

## 2023-10-25 NOTE — TELEPHONE ENCOUNTER
VOICEMAIL  1. Caller Name: [facility]                    2. Message: LVM stating pt somehow was able to schedule a bone density with an  order from 21. She requests that if pt does need this scan a new order needs to be placed.     3. Patient approves office to leave a detailed voicemail/MyChart message: N\A

## 2023-11-02 ENCOUNTER — HOSPITAL ENCOUNTER (OUTPATIENT)
Dept: RADIOLOGY | Facility: MEDICAL CENTER | Age: 78
End: 2023-11-02
Attending: PHYSICIAN ASSISTANT
Payer: MEDICARE

## 2023-11-02 DIAGNOSIS — Z78.0 POSTMENOPAUSAL: ICD-10-CM

## 2023-11-02 PROCEDURE — 77080 DXA BONE DENSITY AXIAL: CPT

## 2023-11-08 RX ORDER — ESTRADIOL 0.1 MG/G
0.5 CREAM VAGINAL
Qty: 42.5 G | Refills: 5 | Status: SHIPPED | OUTPATIENT
Start: 2023-11-08

## 2023-11-13 ENCOUNTER — OFFICE VISIT (OUTPATIENT)
Dept: MEDICAL GROUP | Facility: MEDICAL CENTER | Age: 78
End: 2023-11-13
Payer: MEDICARE

## 2023-11-13 VITALS
BODY MASS INDEX: 27.58 KG/M2 | HEIGHT: 68 IN | WEIGHT: 182 LBS | OXYGEN SATURATION: 94 % | DIASTOLIC BLOOD PRESSURE: 80 MMHG | HEART RATE: 80 BPM | TEMPERATURE: 97.9 F | SYSTOLIC BLOOD PRESSURE: 132 MMHG | RESPIRATION RATE: 16 BRPM

## 2023-11-13 DIAGNOSIS — Z00.00 ENCOUNTER FOR MEDICARE ANNUAL WELLNESS EXAM: ICD-10-CM

## 2023-11-13 DIAGNOSIS — Z12.31 ENCOUNTER FOR SCREENING MAMMOGRAM FOR MALIGNANT NEOPLASM OF BREAST: ICD-10-CM

## 2023-11-13 DIAGNOSIS — H91.93 DECREASED HEARING OF BOTH EARS: ICD-10-CM

## 2023-11-13 DIAGNOSIS — Z11.59 NEED FOR HEPATITIS C SCREENING TEST: ICD-10-CM

## 2023-11-13 DIAGNOSIS — I10 ESSENTIAL HYPERTENSION: ICD-10-CM

## 2023-11-13 DIAGNOSIS — E78.5 DYSLIPIDEMIA: ICD-10-CM

## 2023-11-13 DIAGNOSIS — Z23 NEED FOR VACCINATION: ICD-10-CM

## 2023-11-13 PROCEDURE — 90662 IIV NO PRSV INCREASED AG IM: CPT | Performed by: FAMILY MEDICINE

## 2023-11-13 PROCEDURE — 3079F DIAST BP 80-89 MM HG: CPT | Performed by: FAMILY MEDICINE

## 2023-11-13 PROCEDURE — G0008 ADMIN INFLUENZA VIRUS VAC: HCPCS | Performed by: FAMILY MEDICINE

## 2023-11-13 PROCEDURE — 3075F SYST BP GE 130 - 139MM HG: CPT | Performed by: FAMILY MEDICINE

## 2023-11-13 PROCEDURE — G0439 PPPS, SUBSEQ VISIT: HCPCS | Mod: 25 | Performed by: FAMILY MEDICINE

## 2023-11-13 RX ORDER — ROSUVASTATIN CALCIUM 5 MG/1
5 TABLET, COATED ORAL EVERY EVENING
Qty: 90 TABLET | Refills: 3 | Status: SHIPPED | OUTPATIENT
Start: 2023-11-13

## 2023-11-13 RX ORDER — LOSARTAN POTASSIUM 100 MG/1
100 TABLET ORAL
Qty: 90 TABLET | Refills: 3 | Status: SHIPPED | OUTPATIENT
Start: 2023-11-13

## 2023-11-13 ASSESSMENT — ENCOUNTER SYMPTOMS: GENERAL WELL-BEING: FAIR

## 2023-11-13 ASSESSMENT — PATIENT HEALTH QUESTIONNAIRE - PHQ9: CLINICAL INTERPRETATION OF PHQ2 SCORE: 0

## 2023-11-13 ASSESSMENT — ACTIVITIES OF DAILY LIVING (ADL): BATHING_REQUIRES_ASSISTANCE: 0

## 2023-11-13 NOTE — PROGRESS NOTES
Chief Complaint   Patient presents with    Annual Wellness Visit    Lab Results       HPI:  Rosenda Cowan is a 77 y.o. here for Medicare Annual Wellness Visit     Patient Active Problem List    Diagnosis Date Noted    Vaginal erosion secondary to pessary use (HCC) 08/26/2022    Incomplete uterovaginal prolapse 08/26/2022    Pessary maintenance 08/30/2021    Dyslipidemia 01/07/2020    Vitamin D deficiency 10/01/2019    Post herpetic neuralgia 06/14/2019    Essential hypertension 06/14/2019    Rectocele 06/14/2019    Cystocele with uterine prolapse 12/28/2017       Current Outpatient Medications   Medication Sig Dispense Refill    rosuvastatin (CRESTOR) 5 MG Tab Take 1 Tablet by mouth every evening. 90 Tablet 3    losartan (COZAAR) 100 MG Tab Take 1 Tablet by mouth at bedtime. 90 Tablet 3    estradiol (ESTRACE) 0.1 MG/GM vaginal cream Insert 0.5 g into the vagina Every Sunday and Wednesday. 42.5 g 5    Calcium Carb-Cholecalciferol (CALCIUM 600+D3) 600-800 MG-UNIT Tab Take  by mouth.      Multiple Vitamins-Minerals (MULTIPLE VITAMINS/WOMENS) Tab Take  by mouth.      LYRICA 150 MG Cap Take 1 Cap by mouth 3 times a day.       No current facility-administered medications for this visit.          Current supplements as per medication list.     Allergies: Patient has no known allergies.    Current social contact/activities:   Hasn't been as active lately  Using Elliptical 3 times per week, right knee has been bothering her a bit   Keeps quite busy, walks with a friend twice a week ~1-2 miles    She  reports that she has never smoked. She has never used smokeless tobacco. She reports current alcohol use. She reports that she does not use drugs.  Counseling given: Not Answered      ROS:    Gait: Uses no assistive device  Ostomy: No  Other tubes: No  Amputations: No  Chronic oxygen use: No  Last eye exam: December 2023  Wears hearing aids: No   : Denies any urinary leakage during the last 6  months    Screening:    Depression Screening  Little interest or pleasure in doing things?  0 - not at all  Feeling down, depressed , or hopeless? 0 - not at all  Patient Health Questionnaire Score: 0     If depressive symptoms identified deferred to follow up visit unless specifically addressed in assessment and plan.    Interpretation of PHQ-9 Total Score   Score Severity   1-4 No Depression   5-9 Mild Depression   10-14 Moderate Depression   15-19 Moderately Severe Depression   20-27 Severe Depression    Screening for Cognitive Impairment  Do you or any of your friends or family members have any concern about your memory? No  Three Minute Recall (Banana, Sunrise, Chair) 3/3    Ankit clock face with all 12 numbers and set the hands to show 20 past 8.  Yes    Cognitive concerns identified deferred for follow up unless specifically addressed in assessment and plan.    Fall Risk Assessment  Has the patient had two or more falls in the last year or any fall with injury in the last year?  No    Safety Assessment  Do you always wear your seatbelt?  Yes  Any changes to home needed to function safely? No  Difficulty hearing.  No  Patient counseled about all safety risks that were identified.    Functional Assessment ADLs  Are there any barriers preventing you from cooking for yourself or meeting nutritional needs?  No.    Are there any barriers preventing you from driving safely or obtaining transportation?  No.    Are there any barriers preventing you from using a telephone or calling for help?  No    Are there any barriers preventing you from shopping?  No.    Are there any barriers preventing you from taking care of your own finances?  No    Are there any barriers preventing you from managing your medications?  No    Are there any barriers preventing you from showering, bathing or dressing yourself? No    Are there any barriers preventing you from doing housework or laundry? No  Are there any barriers preventing you  from using the toilet?No  Are you currently engaging in any exercise or physical activity?  Yes. Pt does her exercise equipment at home     Self-Assessment of Health  What is your perception of your health? Fair  Do you sleep more than six hours a night? Yes  In the past 7 days, how much did pain keep you from doing your normal work? Some  Do you spend quality time with family or friends (virtually or in person)? Yes  Do you usually eat a heart healthy diet that constists of a variety of fruits, vegetables, whole grains and fiber? No  Do you eat foods high in fat and/or Fast Food more than three times per week? No    Advance Care Planning  Do you have an Advance Directive, Living Will, Durable Power of , or POLST? No                 Health Maintenance Summary            Ordered - Hepatitis C Screening (Once) Ordered on 11/13/2023      No completion history exists for this topic.              Overdue - Zoster (Shingles) Vaccines (2 of 2) Overdue since 10/11/2020      08/16/2020  Imm Admin: Zoster Vaccine Recombinant (RZV) (SHINGRIX)              Overdue - COVID-19 Vaccine (3 - Pfizer series) Overdue since 6/11/2021 04/16/2021  Imm Admin: PFIZER PURPLE CAP SARS-COV-2 VACCINATION (12+)    03/24/2021  Imm Admin: PFIZER PURPLE CAP SARS-COV-2 VACCINATION (12+)              Colorectal Cancer Screening (Colonoscopy - Every 7 Years) Next due on 4/7/2024 04/07/2017  REFERRAL TO GI FOR COLONOSCOPY              Annual Wellness Visit (Every 366 Days) Next due on 11/13/2024 11/13/2023  Visit Dx: Encounter for Medicare annual wellness exam    07/29/2020  Visit Dx: Medicare annual wellness visit, initial              IMM DTaP/Tdap/Td Vaccine (2 - Td or Tdap) Next due on 1/28/2026 01/28/2016  Imm Admin: Tdap Vaccine              Bone Density Scan (Every 5 Years) Next due on 11/2/2028 11/02/2023  DS-BONE DENSITY STUDY (DEXA)              Pneumococcal Vaccine: 65+ Years (Series Information) Completed       08/10/2018  Imm Admin: Pneumococcal polysaccharide vaccine (PPSV-23)    12/16/2015  Imm Admin: Pneumococcal Conjugate Vaccine (Prevnar/PCV-13)    10/26/2009  Imm Admin: Pneumococcal polysaccharide vaccine (PPSV-23)              Influenza Vaccine (Series Information) Completed      11/13/2023  Imm Admin: Influenza Vaccine Adult HD    11/21/2022  Imm Admin: Influenza Vaccine Adult HD    10/27/2021  Imm Admin: Influenza Vaccine Adult HD    10/08/2020  Imm Admin: Influenza Vaccine Adult HD    01/07/2020  Imm Admin: Influenza Vaccine Adult HD    Only the first 5 history entries have been loaded, but more history exists.              Hepatitis A Vaccine (Hep A) (Series Information) Aged Out      No completion history exists for this topic.              Hepatitis B Vaccine (Hep B) (Series Information) Aged Out      No completion history exists for this topic.              HPV Vaccines (Series Information) Aged Out      No completion history exists for this topic.              Polio Vaccine (Inactivated Polio) (Series Information) Aged Out      No completion history exists for this topic.              Meningococcal Immunization (Series Information) Aged Out      No completion history exists for this topic.              Discontinued - Mammogram  Ordered on 11/13/2023        Frequency changed to Never automatically (Topic No Longer Applies)    07/07/2023  MA-DIAGNOSTIC MAMMO RIGHT W/TOMOSYNTHESIS W/CAD    01/12/2023  MA-DIAGNOSTIC MAMMO RIGHT W/TOMOSYNTHESIS W/O CAD    12/29/2022  MA-SCREENING MAMMO BILAT W/TOMOSYNTHESIS W/CAD    04/23/2018  [id number]    Only the first 5 history entries have been loaded, but more history exists.              Discontinued - Cervical Cancer Screening  Discontinued        Frequency changed to Never automatically (Topic No Longer Applies)    06/26/2019  THINPREP PAP WITH HPV    06/26/2019  Pathology Gynecology Specimen                    Patient Care Team:  Graham Kim,  "SONIA as PCP - General (Family Medicine)  Paulette Rosa D.O. as Consulting Physician (Obstetrics & Gynecology)        Social History     Tobacco Use    Smoking status: Never    Smokeless tobacco: Never   Vaping Use    Vaping Use: Never used   Substance Use Topics    Alcohol use: Yes     Comment: 3 - 4 dirnks per month.     Drug use: No     Family History   Problem Relation Age of Onset    Hypertension Mother     Heart Disease Father     Hypertension Father     Colon Cancer Father     Other Sister         Intellectual Disability     No Known Problems Daughter     No Known Problems Daughter      She  has a past medical history of Hypertension.   Past Surgical History:   Procedure Laterality Date    MN HYSTEROSCOPY,DX,SEP PROC N/A 10/16/2019    Procedure: HYSTEROSCOPY, DIAGNOSTIC;  Surgeon: Erin Gann M.D.;  Location: SURGERY SAME DAY [facility] ORS;  Service: Obstetrics    DILATION AND CURETTAGE N/A 10/16/2019    Procedure: DILATION AND CURETTAGE;  Surgeon: Erin Gann M.D.;  Location: SURGERY SAME DAY [facility] ORS;  Service: Obstetrics    APPENDECTOMY      CHOLECYSTECTOMY      TUBAL COAGULATION LAPAROSCOPIC BILATERAL         Exam:   /80   Pulse 80   Temp 36.6 °C (97.9 °F) (Temporal)   Resp 16   Ht 1.715 m (5' 7.5\")   Wt 82.6 kg (182 lb)   SpO2 94%  Body mass index is 28.08 kg/m².    Hearing fair.    Dentition upper dentures  Alert, oriented in no acute distress.  Eye contact is good, speech goal directed, affect calm  TM appears thicker, signs of past infection  HRRR  CTAB    Assessment and Plan. The following treatment and monitoring plan is recommended:    1. Encounter for Medicare annual wellness exam  Age appropriate guidance provided regarding diet, exercise, vaccines, medications and cancer screenings.    2. Need for vaccination  - Influenza Vaccine, High Dose (65+ Only)    3. Dyslipidemia  Chronic, has not been well controlled, no longer at goal ranges for last few years. " Patient amenable to starting low dose statin. Will recheck in 6 months.  - rosuvastatin (CRESTOR) 5 MG Tab; Take 1 Tablet by mouth every evening.  Dispense: 90 Tablet; Refill: 3  - Comp Metabolic Panel; Future  - Lipid Profile; Future    4. Essential hypertension  This is a chronic condition, controlled.  Blood pressure is at goal under 140/90 in the office today.  We will continue the current regimen.  - losartan (COZAAR) 100 MG Tab; Take 1 Tablet by mouth at bedtime.  Dispense: 90 Tablet; Refill: 3  - Comp Metabolic Panel; Future    5. Decreased hearing of both ears  - Referral to Audiology    6. Encounter for screening mammogram for malignant neoplasm of breast  - MA-SCREENING MAMMO BILAT W/TOMOSYNTHESIS W/CAD; Future  - US-BREAST LIMITED-RIGHT; Future    7. Need for hepatitis C screening test  - HCV Scrn ( [id number] 1xLife); Future      Services suggested: No services needed at this time  Health Care Screening: Age-appropriate preventive services recommended by USPTF and ACIP covered by Medicare were discussed today. Services ordered if indicated and agreed upon by the patient.  Referrals offered: Community-based lifestyle interventions to reduce health risks and promote self-management and wellness, fall prevention, nutrition, physical activity, tobacco-use cessation, weight loss, and mental health services as per orders if indicated.    Discussion today about general wellness and lifestyle habits:    Prevent falls and reduce trip hazards; Cautioned about securing or removing rugs.  Have a working fire alarm and carbon monoxide detector;   Engage in regular physical activity and social activities     Follow-up: No follow-ups on file.

## 2023-11-13 NOTE — PATIENT INSTRUCTIONS
I recommend the following for your bone density:  - 1200 mg of calcium daily through diet and/or supplementation  - 2000 to 4000 international units of vitamin D daily  - doing exercises that build muscle tone (weight bearing, against gravity)

## 2023-11-14 DIAGNOSIS — R92.8 ABNORMAL MAMMOGRAM OF RIGHT BREAST: ICD-10-CM

## 2023-11-29 ENCOUNTER — PATIENT MESSAGE (OUTPATIENT)
Dept: HEALTH INFORMATION MANAGEMENT | Facility: OTHER | Age: 78
End: 2023-11-29

## 2024-05-09 ENCOUNTER — APPOINTMENT (OUTPATIENT)
Dept: MEDICAL GROUP | Facility: MEDICAL CENTER | Age: 79
End: 2024-05-09
Payer: MEDICARE

## 2024-05-31 ENCOUNTER — HOSPITAL ENCOUNTER (OUTPATIENT)
Dept: RADIOLOGY | Facility: MEDICAL CENTER | Age: 79
End: 2024-05-31
Attending: FAMILY MEDICINE
Payer: MEDICARE

## 2024-05-31 DIAGNOSIS — Z12.31 ENCOUNTER FOR SCREENING MAMMOGRAM FOR MALIGNANT NEOPLASM OF BREAST: ICD-10-CM

## 2024-05-31 DIAGNOSIS — R92.8 ABNORMAL MAMMOGRAM OF RIGHT BREAST: ICD-10-CM

## 2024-05-31 PROCEDURE — 76642 ULTRASOUND BREAST LIMITED: CPT | Mod: RT

## 2024-05-31 PROCEDURE — G0279 TOMOSYNTHESIS, MAMMO: HCPCS

## 2024-11-05 ENCOUNTER — HOSPITAL ENCOUNTER (OUTPATIENT)
Dept: LAB | Facility: MEDICAL CENTER | Age: 79
End: 2024-11-05
Attending: FAMILY MEDICINE
Payer: MEDICARE

## 2024-11-05 DIAGNOSIS — I10 ESSENTIAL HYPERTENSION: ICD-10-CM

## 2024-11-05 DIAGNOSIS — E78.5 DYSLIPIDEMIA: ICD-10-CM

## 2024-11-05 DIAGNOSIS — Z11.59 NEED FOR HEPATITIS C SCREENING TEST: ICD-10-CM

## 2024-11-05 PROCEDURE — G0472 HEP C SCREEN HIGH RISK/OTHER: HCPCS

## 2024-11-05 PROCEDURE — 36415 COLL VENOUS BLD VENIPUNCTURE: CPT | Mod: GZ

## 2024-11-05 PROCEDURE — 80061 LIPID PANEL: CPT

## 2024-11-05 PROCEDURE — 80053 COMPREHEN METABOLIC PANEL: CPT

## 2024-11-06 LAB
ALBUMIN SERPL BCP-MCNC: 3.9 G/DL (ref 3.2–4.9)
ALBUMIN/GLOB SERPL: 1 G/DL
ALP SERPL-CCNC: 94 U/L (ref 30–99)
ALT SERPL-CCNC: 17 U/L (ref 2–50)
ANION GAP SERPL CALC-SCNC: 11 MMOL/L (ref 7–16)
AST SERPL-CCNC: 36 U/L (ref 12–45)
BILIRUB SERPL-MCNC: 0.5 MG/DL (ref 0.1–1.5)
BUN SERPL-MCNC: 13 MG/DL (ref 8–22)
CALCIUM ALBUM COR SERPL-MCNC: 10 MG/DL (ref 8.5–10.5)
CALCIUM SERPL-MCNC: 9.9 MG/DL (ref 8.5–10.5)
CHLORIDE SERPL-SCNC: 105 MMOL/L (ref 96–112)
CHOLEST SERPL-MCNC: 203 MG/DL (ref 100–199)
CO2 SERPL-SCNC: 26 MMOL/L (ref 20–33)
CREAT SERPL-MCNC: 0.71 MG/DL (ref 0.5–1.4)
FASTING STATUS PATIENT QL REPORTED: NORMAL
GFR SERPLBLD CREATININE-BSD FMLA CKD-EPI: 88 ML/MIN/1.73 M 2
GLOBULIN SER CALC-MCNC: 4 G/DL (ref 1.9–3.5)
GLUCOSE SERPL-MCNC: 99 MG/DL (ref 65–99)
HCV AB SER QL: NONREACTIVE
HDLC SERPL-MCNC: 54 MG/DL
LDLC SERPL CALC-MCNC: 132 MG/DL
POTASSIUM SERPL-SCNC: 4 MMOL/L (ref 3.6–5.5)
PROT SERPL-MCNC: 7.9 G/DL (ref 6–8.2)
SODIUM SERPL-SCNC: 142 MMOL/L (ref 135–145)
TRIGL SERPL-MCNC: 87 MG/DL (ref 0–149)

## 2024-11-12 RX ORDER — CAPSAICIN 8 %
KIT TOPICAL
COMMUNITY
Start: 2024-02-28 | End: 2024-11-14

## 2024-11-12 RX ORDER — PREGABALIN 150 MG/1
1 CAPSULE ORAL EVERY 8 HOURS
COMMUNITY
End: 2024-11-14 | Stop reason: SDUPTHER

## 2024-11-12 RX ORDER — ROSUVASTATIN CALCIUM 5 MG/1
1 TABLET, COATED ORAL EVERY EVENING
COMMUNITY
End: 2024-11-14

## 2024-11-12 RX ORDER — LOSARTAN POTASSIUM 100 MG/1
1 TABLET ORAL
COMMUNITY
End: 2024-11-14

## 2024-11-14 ENCOUNTER — OFFICE VISIT (OUTPATIENT)
Dept: MEDICAL GROUP | Facility: MEDICAL CENTER | Age: 79
End: 2024-11-14
Payer: MEDICARE

## 2024-11-14 VITALS
HEART RATE: 82 BPM | HEIGHT: 67 IN | OXYGEN SATURATION: 96 % | DIASTOLIC BLOOD PRESSURE: 80 MMHG | TEMPERATURE: 97.7 F | BODY MASS INDEX: 27.78 KG/M2 | WEIGHT: 177 LBS | SYSTOLIC BLOOD PRESSURE: 142 MMHG

## 2024-11-14 DIAGNOSIS — M85.89 OSTEOPENIA OF MULTIPLE SITES: ICD-10-CM

## 2024-11-14 DIAGNOSIS — R01.1 MURMUR, CARDIAC: ICD-10-CM
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
## 2024-11-14 DIAGNOSIS — I10 ESSENTIAL HYPERTENSION: ICD-10-CM

## 2024-11-14 DIAGNOSIS — Z23 NEED FOR VACCINATION: ICD-10-CM

## 2024-11-14 DIAGNOSIS — R25.3 MUSCLE TWITCHING: ICD-10-CM

## 2024-11-14 DIAGNOSIS — Z00.00 ENCOUNTER FOR MEDICARE ANNUAL WELLNESS EXAM: ICD-10-CM

## 2024-11-14 DIAGNOSIS — E78.5 DYSLIPIDEMIA: ICD-10-CM

## 2024-11-14 DIAGNOSIS — B02.29 POSTHERPETIC NEURALGIA: ICD-10-CM

## 2024-11-14 PROCEDURE — 3077F SYST BP >= 140 MM HG: CPT | Performed by: FAMILY MEDICINE

## 2024-11-14 PROCEDURE — G0008 ADMIN INFLUENZA VIRUS VAC: HCPCS | Performed by: FAMILY MEDICINE

## 2024-11-14 PROCEDURE — G0439 PPPS, SUBSEQ VISIT: HCPCS | Mod: 25 | Performed by: FAMILY MEDICINE

## 2024-11-14 PROCEDURE — 3079F DIAST BP 80-89 MM HG: CPT | Performed by: FAMILY MEDICINE

## 2024-11-14 PROCEDURE — 90662 IIV NO PRSV INCREASED AG IM: CPT | Performed by: FAMILY MEDICINE

## 2024-11-14 RX ORDER — PREGABALIN 150 MG/1
150 CAPSULE ORAL EVERY 8 HOURS
Qty: 270 CAPSULE | Refills: 1 | Status: SHIPPED | OUTPATIENT
Start: 2024-12-09 | End: 2025-06-07

## 2024-11-14 RX ORDER — ATORVASTATIN CALCIUM 10 MG/1
10 TABLET, FILM COATED ORAL NIGHTLY
Qty: 90 TABLET | Refills: 3 | Status: SHIPPED | OUTPATIENT
Start: 2024-11-14

## 2024-11-14 RX ORDER — LOSARTAN POTASSIUM 100 MG/1
100 TABLET ORAL
Qty: 90 TABLET | Refills: 3 | Status: SHIPPED | OUTPATIENT
Start: 2024-11-14

## 2024-11-14 SDOH — ECONOMIC STABILITY: FOOD INSECURITY: WITHIN THE PAST 12 MONTHS, YOU WORRIED THAT YOUR FOOD WOULD RUN OUT BEFORE YOU GOT MONEY TO BUY MORE.: NEVER TRUE

## 2024-11-14 SDOH — HEALTH STABILITY: PHYSICAL HEALTH: ON AVERAGE, HOW MANY MINUTES DO YOU ENGAGE IN EXERCISE AT THIS LEVEL?: 10 MIN

## 2024-11-14 SDOH — ECONOMIC STABILITY: FOOD INSECURITY: WITHIN THE PAST 12 MONTHS, THE FOOD YOU BOUGHT JUST DIDN'T LAST AND YOU DIDN'T HAVE MONEY TO GET MORE.: NEVER TRUE

## 2024-11-14 SDOH — ECONOMIC STABILITY: INCOME INSECURITY: IN THE LAST 12 MONTHS, WAS THERE A TIME WHEN YOU WERE NOT ABLE TO PAY THE MORTGAGE OR RENT ON TIME?: NO

## 2024-11-14 SDOH — HEALTH STABILITY: PHYSICAL HEALTH: ON AVERAGE, HOW MANY DAYS PER WEEK DO YOU ENGAGE IN MODERATE TO STRENUOUS EXERCISE (LIKE A BRISK WALK)?: 3 DAYS

## 2024-11-14 SDOH — ECONOMIC STABILITY: INCOME INSECURITY: HOW HARD IS IT FOR YOU TO PAY FOR THE VERY BASICS LIKE FOOD, HOUSING, MEDICAL CARE, AND HEATING?: NOT HARD AT ALL

## 2024-11-14 ASSESSMENT — ACTIVITIES OF DAILY LIVING (ADL): BATHING_REQUIRES_ASSISTANCE: 0

## 2024-11-14 ASSESSMENT — LIFESTYLE VARIABLES
HOW OFTEN DO YOU HAVE SIX OR MORE DRINKS ON ONE OCCASION: NEVER
HOW OFTEN DO YOU HAVE A DRINK CONTAINING ALCOHOL: 2-4 TIMES A MONTH
HOW MANY STANDARD DRINKS CONTAINING ALCOHOL DO YOU HAVE ON A TYPICAL DAY: 1 OR 2
AUDIT-C TOTAL SCORE: 2
SKIP TO QUESTIONS 9-10: 1

## 2024-11-14 ASSESSMENT — SOCIAL DETERMINANTS OF HEALTH (SDOH)
DO YOU BELONG TO ANY CLUBS OR ORGANIZATIONS SUCH AS CHURCH GROUPS UNIONS, FRATERNAL OR ATHLETIC GROUPS, OR SCHOOL GROUPS?: NO
HOW HARD IS IT FOR YOU TO PAY FOR THE VERY BASICS LIKE FOOD, HOUSING, MEDICAL CARE, AND HEATING?: NOT HARD AT ALL
HOW OFTEN DO YOU ATTEND CHURCH OR RELIGIOUS SERVICES?: NEVER
WITHIN THE PAST 12 MONTHS, YOU WORRIED THAT YOUR FOOD WOULD RUN OUT BEFORE YOU GOT THE MONEY TO BUY MORE: NEVER TRUE
DO YOU BELONG TO ANY CLUBS OR ORGANIZATIONS SUCH AS CHURCH GROUPS UNIONS, FRATERNAL OR ATHLETIC GROUPS, OR SCHOOL GROUPS?: NO
HOW OFTEN DO YOU HAVE A DRINK CONTAINING ALCOHOL: 2-4 TIMES A MONTH
HOW OFTEN DO YOU GET TOGETHER WITH FRIENDS OR RELATIVES?: TWICE A WEEK
HOW MANY DRINKS CONTAINING ALCOHOL DO YOU HAVE ON A TYPICAL DAY WHEN YOU ARE DRINKING: 1 OR 2
HOW OFTEN DO YOU ATTENT MEETINGS OF THE CLUB OR ORGANIZATION YOU BELONG TO?: NEVER
HOW OFTEN DO YOU ATTEND CHURCH OR RELIGIOUS SERVICES?: NEVER
IN A TYPICAL WEEK, HOW MANY TIMES DO YOU TALK ON THE PHONE WITH FAMILY, FRIENDS, OR NEIGHBORS?: MORE THAN THREE TIMES A WEEK
HOW OFTEN DO YOU ATTENT MEETINGS OF THE CLUB OR ORGANIZATION YOU BELONG TO?: NEVER
HOW OFTEN DO YOU GET TOGETHER WITH FRIENDS OR RELATIVES?: TWICE A WEEK
IN A TYPICAL WEEK, HOW MANY TIMES DO YOU TALK ON THE PHONE WITH FAMILY, FRIENDS, OR NEIGHBORS?: MORE THAN THREE TIMES A WEEK
HOW OFTEN DO YOU HAVE SIX OR MORE DRINKS ON ONE OCCASION: NEVER
IN THE PAST 12 MONTHS, HAS THE ELECTRIC, GAS, OIL, OR WATER COMPANY THREATENED TO SHUT OFF SERVICE IN YOUR HOME?: NO

## 2024-11-14 ASSESSMENT — PATIENT HEALTH QUESTIONNAIRE - PHQ9: CLINICAL INTERPRETATION OF PHQ2 SCORE: 0

## 2024-11-14 ASSESSMENT — ENCOUNTER SYMPTOMS: GENERAL WELL-BEING: GOOD
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
## 2025-01-16 ENCOUNTER — HOSPITAL ENCOUNTER (OUTPATIENT)
Dept: CARDIOLOGY | Facility: MEDICAL CENTER | Age: 80
End: 2025-01-16
Attending: FAMILY MEDICINE
Payer: MEDICARE

## 2025-01-16 ENCOUNTER — TELEPHONE (OUTPATIENT)
Dept: MEDICAL GROUP | Facility: MEDICAL CENTER | Age: 80
End: 2025-01-16

## 2025-01-16 DIAGNOSIS — I35.0 AORTIC VALVE STENOSIS, ETIOLOGY OF CARDIAC VALVE DISEASE UNSPECIFIED: ICD-10-CM

## 2025-01-16 DIAGNOSIS — R01.1 MURMUR, CARDIAC: ICD-10-CM

## 2025-01-16 LAB
LV EJECT FRACT  99904: 63
LV EJECT FRACT MOD 2C 99903: 57.85
LV EJECT FRACT MOD 4C 99902: 67.45
LV EJECT FRACT MOD BP 99901: 63.03

## 2025-01-16 PROCEDURE — 93306 TTE W/DOPPLER COMPLETE: CPT

## 2025-01-16 PROCEDURE — 93306 TTE W/DOPPLER COMPLETE: CPT | Mod: 26 | Performed by: INTERNAL MEDICINE

## 2025-01-16 NOTE — TELEPHONE ENCOUNTER
Called to follow-up on echocardiogram results.  Rosenda is currently napping will try again a little later.

## 2025-01-22 ENCOUNTER — TELEPHONE (OUTPATIENT)
Dept: CARDIOLOGY | Facility: MEDICAL CENTER | Age: 80
End: 2025-01-22
Payer: MEDICARE

## 2025-01-22 DIAGNOSIS — I35.0 SEVERE AORTIC STENOSIS: ICD-10-CM

## 2025-01-22 DIAGNOSIS — Z01.810 PRE-PROCEDURAL CARDIOVASCULAR EXAMINATION: ICD-10-CM

## 2025-01-22 DIAGNOSIS — R01.1 MURMUR, CARDIAC: ICD-10-CM

## 2025-01-22 NOTE — TELEPHONE ENCOUNTER
Referral from: Dr. Graham Kim for low flow severe AS.    Patient called on 1/22/25, scheduled with Dr. Nava and Dr. Taylor for 1/29 consultation and CT on 1/30.    Discussed with patient consultation appointments, testing needed, and plan of care.    Patient given dates and times of testing and consultations.    All questions answered.    Phone number given to patient for [facility] for any further questions or concerns.    Fatigue, shortness of breath for the last 3-4 months per patient report.    She was given the need for labs to be completed prior to her CT.

## 2025-01-22 NOTE — PROGRESS NOTES
REFERRING PHYSICIAN: Abad Nava MD.     CONSULTING PHYSICIAN: Daniel Taylor DO     CHIEF COMPLAINT: Shortness of breath    HISTORY OF PRESENT ILLNESS: The patient is a 79 y.o. female with a past medical history significant for hypertension, dyslipidemia, aortic stenosis who presents with progressive shortness of breath with minimal exertion. She has associated heart murmur, fatigue, orthopnea, and chest pain described as tight and pressure. She denies  palpitations, syncope, lower extremity edema. She is accompanied by her , Tesfaye, for the entirety of this consultation. Her family history is positive for cardiac disease in her father, who passed in his 80s from aortic stenosis.     PAST MEDICAL HISTORY:   Active Ambulatory Problems     Diagnosis Date Noted    Cystocele with uterine prolapse 12/28/2017    Postherpetic neuralgia 06/14/2019    Essential hypertension 06/14/2019    Rectocele 06/14/2019    Vitamin D deficiency 10/01/2019    Dyslipidemia 01/07/2020    Pessary maintenance 08/30/2021    Vaginal erosion secondary to pessary use (HCC) 08/26/2022    Incomplete uterovaginal prolapse 08/26/2022     Resolved Ambulatory Problems     Diagnosis Date Noted    No Resolved Ambulatory Problems     Past Medical History:   Diagnosis Date    Hypertension        PAST SURGICAL HISTORY:   Past Surgical History:   Procedure Laterality Date    CA HYSTEROSCOPY,DX,SEP PROC N/A 10/16/2019    Procedure: HYSTEROSCOPY, DIAGNOSTIC;  Surgeon: Erin Gann M.D.;  Location: SURGERY SAME DAY [facility];  Service: Obstetrics    DILATION AND CURETTAGE N/A 10/16/2019    Procedure: DILATION AND CURETTAGE;  Surgeon: Erin Gann M.D.;  Location: SURGERY SAME DAY [facility];  Service: Obstetrics    APPENDECTOMY      CHOLECYSTECTOMY      TUBAL COAGULATION LAPAROSCOPIC BILATERAL          ALLERGIES: No Known Allergies     CURRENT MEDICATIONS:   Current Outpatient Medications:     atorvastatin (LIPITOR) 10  MG Tab, Take 1 Tablet by mouth every evening., Disp: 90 Tablet, Rfl: 3    losartan (COZAAR) 100 MG Tab, Take 1 Tablet by mouth at bedtime., Disp: 90 Tablet, Rfl: 3    pregabalin (LYRICA) 150 MG Cap, Take 1 Capsule by mouth every 8 hours for 180 days. Indications: Neuropathic Pain, Disp: 270 Capsule, Rfl: 1    estradiol (ESTRACE) 0.1 MG/GM vaginal cream, Insert 0.5 g into the vagina Every Sunday and Wednesday., Disp: 42.5 g, Rfl: 5    Calcium Carb-Cholecalciferol (CALCIUM 600+D3) 600-800 MG-UNIT Tab, Take  by mouth., Disp: , Rfl:     Multiple Vitamins-Minerals (MULTIPLE VITAMINS/WOMENS) Tab, Take  by mouth., Disp: , Rfl:     FAMILY HISTORY:   Family History   Problem Relation Age of Onset    Hypertension Mother     Heart Disease Father     Hypertension Father     Colon Cancer Father     Other Sister         Intellectual Disability     No Known Problems Daughter     No Known Problems Daughter         SOCIAL HISTORY:   Social History     Socioeconomic History    Marital status:      Spouse name: Not on file    Number of children: Not on file    Years of education: Not on file    Highest education level: 12th grade   Occupational History    Not on file   Tobacco Use    Smoking status: Never    Smokeless tobacco: Never   Vaping Use    Vaping status: Never Used   Substance and Sexual Activity    Alcohol use: Yes     Comment: 3 - 4 dirnks per month.     Drug use: No    Sexual activity: Yes     Partners: Male     Birth control/protection: Post-Menopausal     Comment: .    Other Topics Concern    Not on file   Social History Narrative    Not on file     Social Drivers of Health     Financial Resource Strain: Low Risk  (11/14/2024)    Overall Financial Resource Strain (CARDIA)     Difficulty of Paying Living Expenses: Not hard at all   Food Insecurity: No Food Insecurity (11/14/2024)    Hunger Vital Sign     Worried About Running Out of Food in the Last Year: Never true     Ran Out of Food in the Last Year:  "Never true   Transportation Needs: No Transportation Needs (11/14/2024)    PRAPARE - Transportation     Lack of Transportation (Medical): No     Lack of Transportation (Non-Medical): No   Physical Activity: Insufficiently Active (11/14/2024)    Exercise Vital Sign     Days of Exercise per Week: 3 days     Minutes of Exercise per Session: 10 min   Stress: Stress Concern Present (11/14/2024)    [employer] - Occupational Stress Questionnaire     Feeling of Stress : To some extent   Social Connections: Moderately Isolated (11/14/2024)    Social Connection and Isolation Panel [NHANES]     Frequency of Communication with Friends and Family: More than three times a week     Frequency of Social Gatherings with Friends and Family: Twice a week     Attends Ep[employer]op[employer] Services: Never     Active Member of Clubs or Organizations: No     Attends Club or Organization Meetings: Never     Marital Status:    Intimate Partner Violence: Not on file   Housing Stability: Low Risk  (11/14/2024)    Housing Stability Vital Sign     Unable to Pay for Housing in the Last Year: No     Number of Times Moved in the Last Year: 0     Homeless in the Last Year: No       REVIEW OF SYSTEMS:  Review of Systems   Constitutional:  Positive for malaise/fatigue. Negative for chills, diaphoresis, fever and weight loss.   HENT:  Negative for congestion, ear pain, hearing loss, nosebleeds, sore throat and tinnitus.    Eyes:  Negative for blurred vision, double vision, pain and discharge.   Respiratory:  Positive for shortness of breath. Negative for cough, hemoptysis, sputum production, wheezing and stridor.    Cardiovascular:  Positive for chest pain (\"pressure, tightness\") and orthopnea. Negative for palpitations and leg swelling.        Murmur   Gastrointestinal:  Negative for abdominal pain, constipation, heartburn, melena, nausea and vomiting.   Genitourinary:  Negative for dysuria, flank pain and hematuria. " "  Musculoskeletal:  Negative for myalgias and neck pain.   Skin:  Negative for itching and rash.   Neurological:  Negative for dizziness, speech change, focal weakness, seizures, weakness and headaches.   Endo/Heme/Allergies:  Negative for environmental allergies and polydipsia. Does not bruise/bleed easily.   Psychiatric/Behavioral:  Negative for depression, hallucinations, substance abuse and suicidal ideas.      PHYSICAL EXAMINATION:    BP (!) 140/80 (BP Location: Left arm, Patient Position: Sitting, BP Cuff Size: Adult)   Pulse 94   Temp 36.6 °C (97.9 °F) (Temporal)   Ht 1.702 m (5' 7\")   Wt 79.4 kg (175 lb)   LMP  (LMP Unknown)   SpO2 92%   BMI 27.41 kg/m²    Physical Exam  Constitutional:       General: She is not in acute distress.     Appearance: Normal appearance.   HENT:      Head: Normocephalic and atraumatic.      Nose: Nose normal.   Eyes:      Conjunctiva/sclera: Conjunctivae normal.      Pupils: Pupils are equal, round, and reactive to light.   Neck:      Vascular: No JVD.      Trachea: No tracheal deviation.   Cardiovascular:      Rate and Rhythm: Normal rate and regular rhythm.      Heart sounds: Murmur heard.   Pulmonary:      Effort: Pulmonary effort is normal. No respiratory distress.      Breath sounds: Normal breath sounds. No stridor.   Abdominal:      General: Bowel sounds are normal. There is no distension.      Palpations: Abdomen is soft.      Tenderness: There is no abdominal tenderness.   Musculoskeletal:         General: No tenderness. Normal range of motion.      Cervical back: Normal range of motion and neck supple.   Skin:     General: Skin is warm and dry.   Neurological:      General: No focal deficit present.      Mental Status: She is alert and oriented to person, place, and time.      Coordination: Coordination normal.      Gait: Gait is intact.   Psychiatric:         Mood and Affect: Mood and affect normal.         Behavior: Behavior normal.         Cognition and Memory: " "Memory normal.       LABS REVIEWED:  Lab Results   Component Value Date/Time    SODIUM 144 01/24/2025 10:25 AM    POTASSIUM 4.9 01/24/2025 10:25 AM    CHLORIDE 106 01/24/2025 10:25 AM    CO2 27 01/24/2025 10:25 AM    GLUCOSE 92 01/24/2025 10:25 AM    BUN 12 01/24/2025 10:25 AM    CREATININE 0.83 01/24/2025 10:25 AM      No results found for: \"PROTHROMBTM\", \"INR\"   Lab Results   Component Value Date/Time    WBC 3.6 (L) 01/06/2021 11:05 AM    RBC 4.62 01/06/2021 11:05 AM    HEMOGLOBIN 14.2 01/06/2021 11:05 AM    HEMATOCRIT 43.5 01/06/2021 11:05 AM    MCV 94.2 01/06/2021 11:05 AM    MCH 30.7 01/06/2021 11:05 AM    MCHC 32.6 (L) 01/06/2021 11:05 AM    MPV 10.7 01/06/2021 11:05 AM    NEUTSPOLYS 39.50 (L) 01/06/2021 11:05 AM    LYMPHOCYTES 45.30 (H) 01/06/2021 11:05 AM    MONOCYTES 9.70 01/06/2021 11:05 AM    EOSINOPHILS 4.40 01/06/2021 11:05 AM    BASOPHILS 0.80 01/06/2021 11:05 AM      IMAGING REVIEWED AND INTERPRETED:    ECHOCARDIOGRAM   1/16/25 [facility] [place]  CONCLUSIONS  No prior study is available for comparison.   Consider severe low-gradient stage D3 AS: Vmax 3.5m/s, AVAi 0.45, SVi   37  Mild MS  Mild MR  Mild concentric LVH with preserved systolic function, estimated LVEF   60-65%  Normal RV size and systolic function  Dilated left atrium  Grade II LV diastolic dysfunction  Normal IVC size  Estimated RVSP 35 mmHg    CARDIAC CATHETERIZATION   Scheduled 2/4/25 [facility] at 0730    CT SCAN CHEST -TAVR  Scheduled 1/30/25 [facility] [place] at 0900      IMPRESSION:  79 y.o. female with a past medical history significant for hypertension, dyslipidemia, symptomatic severe aortic stenosis       PLAN:  I recommend continuing workup for TAVR.  She would prefer to have over surgical valve.  She appears to be quite symptomatic, but her echo findings are little disc concordant.  Her candidacy for TAVR will be confirmed with ongoing imaging.    The procedure, its risks, benefits, potential complications and alternative treatments were discussed with the " patient in detail including the risks should she decide not to undergo my recommended treatment. All of her questions were answered to her satisfaction and she is willing to proceed with the operation. The risks include death, stroke, infection: to include a rare bacterial infection related to the use of the heart/lung machine, brielle-operative myocardial infarction, dysrhythmias, diaphragmatic paralysis, chest wall paresthesia, tracheostomy, kidney or other organ failure, possible return to the operating room for bleeding, bleeding requiring transfusion with its attendant risks including AIDS or hepatitis, dehiscence of surgical incisions, respiratory complications including the need for prolonged ventilator support, Protamine or other drug reaction, peripheral neuropathy, loss of limb, and miscount of surgical items. The operative mortality risk is approximately 3%. The STS mortality risk score for SAVR  is 2% and the morbidity and mortality risk score is 7%. The scores were discussed with patient.    Thank you for this very challenging consultation and participation in the patient’s care.  I will keep you apprised of all future developments.        Sincerely,     Daniel Taylor, DO

## 2025-01-22 NOTE — TELEPHONE ENCOUNTER
----- Message from Nurse Martha PHILLIPS R.N. sent at 1/22/2025  9:36 AM PST -----  Regarding: Pre TAVR Cath  Please hold pre TAVR cath 2/3-2/4. Thanks!

## 2025-01-24 ENCOUNTER — HOSPITAL ENCOUNTER (OUTPATIENT)
Dept: LAB | Facility: MEDICAL CENTER | Age: 80
End: 2025-01-24
Attending: NURSE PRACTITIONER
Payer: MEDICARE

## 2025-01-24 DIAGNOSIS — R01.1 MURMUR, CARDIAC: ICD-10-CM

## 2025-01-24 PROCEDURE — 80053 COMPREHEN METABOLIC PANEL: CPT

## 2025-01-24 PROCEDURE — 36415 COLL VENOUS BLD VENIPUNCTURE: CPT

## 2025-01-25 LAB
ALBUMIN SERPL BCP-MCNC: 3.9 G/DL (ref 3.2–4.9)
ALBUMIN/GLOB SERPL: 1 G/DL
ALP SERPL-CCNC: 97 U/L (ref 30–99)
ALT SERPL-CCNC: 20 U/L (ref 2–50)
ANION GAP SERPL CALC-SCNC: 11 MMOL/L (ref 7–16)
AST SERPL-CCNC: 31 U/L (ref 12–45)
BILIRUB SERPL-MCNC: 0.6 MG/DL (ref 0.1–1.5)
BUN SERPL-MCNC: 12 MG/DL (ref 8–22)
CALCIUM ALBUM COR SERPL-MCNC: 9.7 MG/DL (ref 8.5–10.5)
CALCIUM SERPL-MCNC: 9.6 MG/DL (ref 8.5–10.5)
CHLORIDE SERPL-SCNC: 106 MMOL/L (ref 96–112)
CO2 SERPL-SCNC: 27 MMOL/L (ref 20–33)
CREAT SERPL-MCNC: 0.83 MG/DL (ref 0.5–1.4)
GFR SERPLBLD CREATININE-BSD FMLA CKD-EPI: 72 ML/MIN/1.73 M 2
GLOBULIN SER CALC-MCNC: 3.8 G/DL (ref 1.9–3.5)
GLUCOSE SERPL-MCNC: 92 MG/DL (ref 65–99)
POTASSIUM SERPL-SCNC: 4.9 MMOL/L (ref 3.6–5.5)
PROT SERPL-MCNC: 7.7 G/DL (ref 6–8.2)
SODIUM SERPL-SCNC: 144 MMOL/L (ref 135–145)

## 2025-01-29 ENCOUNTER — DOCUMENTATION (OUTPATIENT)
Dept: CARDIOLOGY | Facility: MEDICAL CENTER | Age: 80
End: 2025-01-29

## 2025-01-29 ENCOUNTER — OFFICE VISIT (OUTPATIENT)
Dept: CARDIOTHORACIC SURGERY | Facility: MEDICAL CENTER | Age: 80
End: 2025-01-29
Payer: MEDICARE

## 2025-01-29 ENCOUNTER — TELEPHONE (OUTPATIENT)
Dept: CARDIOLOGY | Facility: MEDICAL CENTER | Age: 80
End: 2025-01-29

## 2025-01-29 ENCOUNTER — OFFICE VISIT (OUTPATIENT)
Dept: CARDIOLOGY | Facility: MEDICAL CENTER | Age: 80
End: 2025-01-29
Attending: INTERNAL MEDICINE
Payer: MEDICARE

## 2025-01-29 VITALS
HEART RATE: 94 BPM | DIASTOLIC BLOOD PRESSURE: 80 MMHG | HEIGHT: 67 IN | OXYGEN SATURATION: 92 % | SYSTOLIC BLOOD PRESSURE: 140 MMHG | WEIGHT: 175 LBS | BODY MASS INDEX: 27.47 KG/M2 | TEMPERATURE: 97.9 F

## 2025-01-29 VITALS
SYSTOLIC BLOOD PRESSURE: 154 MMHG | BODY MASS INDEX: 27.47 KG/M2 | RESPIRATION RATE: 16 BRPM | DIASTOLIC BLOOD PRESSURE: 80 MMHG | HEIGHT: 67 IN | HEART RATE: 85 BPM | OXYGEN SATURATION: 93 % | WEIGHT: 175 LBS

## 2025-01-29 DIAGNOSIS — I35.0 SEVERE AORTIC STENOSIS: ICD-10-CM

## 2025-01-29 DIAGNOSIS — E78.5 DYSLIPIDEMIA: ICD-10-CM

## 2025-01-29 DIAGNOSIS — I10 ESSENTIAL HYPERTENSION: ICD-10-CM

## 2025-01-29 DIAGNOSIS — Z00.6 EXAMINATION OF PARTICIPANT IN CLINICAL TRIAL: ICD-10-CM

## 2025-01-29 LAB — EKG IMPRESSION: NORMAL

## 2025-01-29 PROCEDURE — 3079F DIAST BP 80-89 MM HG: CPT | Performed by: THORACIC SURGERY (CARDIOTHORACIC VASCULAR SURGERY)

## 2025-01-29 PROCEDURE — 99213 OFFICE O/P EST LOW 20 MIN: CPT | Performed by: INTERNAL MEDICINE

## 2025-01-29 PROCEDURE — 99205 OFFICE O/P NEW HI 60 MIN: CPT | Performed by: THORACIC SURGERY (CARDIOTHORACIC VASCULAR SURGERY)

## 2025-01-29 PROCEDURE — 3077F SYST BP >= 140 MM HG: CPT | Performed by: THORACIC SURGERY (CARDIOTHORACIC VASCULAR SURGERY)

## 2025-01-29 PROCEDURE — 93005 ELECTROCARDIOGRAM TRACING: CPT | Mod: TC | Performed by: INTERNAL MEDICINE

## 2025-01-29 ASSESSMENT — LIFESTYLE VARIABLES: SUBSTANCE_ABUSE: 0

## 2025-01-29 ASSESSMENT — ENCOUNTER SYMPTOMS
MYALGIAS: 0
HEMOPTYSIS: 0
SPUTUM PRODUCTION: 0
POLYDIPSIA: 0
WHEEZING: 0
FOCAL WEAKNESS: 0
VOMITING: 0
ABDOMINAL PAIN: 0
DIAPHORESIS: 0
HEADACHES: 0
ORTHOPNEA: 1
DOUBLE VISION: 0
CONSTIPATION: 0
BRUISES/BLEEDS EASILY: 0
COUGH: 0
EYE PAIN: 0
BLURRED VISION: 0
DIZZINESS: 0
SPEECH CHANGE: 0
WEIGHT LOSS: 0
FLANK PAIN: 0
SEIZURES: 0
FEVER: 0
EYE DISCHARGE: 0
STRIDOR: 0
HALLUCINATIONS: 0
DEPRESSION: 0
NECK PAIN: 0
HEARTBURN: 0
WEAKNESS: 0
NAUSEA: 0
SORE THROAT: 0
PALPITATIONS: 0
CHILLS: 0
SHORTNESS OF BREATH: 1

## 2025-01-29 ASSESSMENT — PATIENT HEALTH QUESTIONNAIRE - PHQ9: CLINICAL INTERPRETATION OF PHQ2 SCORE: 0

## 2025-01-29 NOTE — TELEPHONE ENCOUNTER
Patient is scheduled on 2-4-25 for a Pre TAVR angio with Dr. Nava. No meds to stop and patient to check in at 5:30 for a 7:30 procedure. H&P was done on 1-29-25 [place] . Pre admit to call patient.

## 2025-01-29 NOTE — PROGRESS NOTES
"CARDIOLOGY STRUCTURAL HEART CONSULTATION    PCP: Graham Kim D.O.  REFERRING: Dr. Kim    1. Severe aortic stenosis    2. Essential hypertension    3. Dyslipidemia        Rosenda Cowan has class II symptoms with moderate to severe aortic stenosis-probably D3 though further assessment is needed.  She will complete the valve calcium score and if greater than 1200 we can defer aortic valve study at the time of preoperative angiogram.  I recommend proceeding with valve replacement unless the additional evaluation proves stenosis to be in the moderate range.     We discussed the risks, benefits and alternatives to TAVR including but not limited to a 10% risk of pacemaker, 5% risk of bleeding/access site complication, 2% risk of stroke, risk of contrast nephropathy and 2% risk of mortality. The patient is in agreement with proceeding.        Follow up: 6 weeks    History: Rosenda Cowan is a 79 y.o. female with hypertension and dyslipidemia presenting for assessment of severe aortic stenosis.    Over the past year she has been noticing increased fatigue, lack of energy towards the end of the day or being tired when shopping at big box stores.  She also feels orthopnea and PND.  She has not developed overt signs of peripheral edema and no syncope.  She does recount an episode while at the [employer] recently when she felt foggy and had her  help her out of the [employer]      ROS:   10 point review systems is otherwise negative except as per the HPI    PE:  BP (!) 154/80 (BP Location: Left arm, Patient Position: Sitting, BP Cuff Size: Adult)   Pulse 85   Resp 16   Ht 1.702 m (5' 7\")   Wt 79.4 kg (175 lb)   LMP  (LMP Unknown)   SpO2 93%   BMI 27.41 kg/m²   GEN: NAD  CARDIAC: regular late peaking systolic ejection murmur diminished S2 diminished and delayed carotid upstrokes  RESP: Clear to auscultation bilaterally  ABD: Soft, non-tender, non-distended  EXT: No edema  NEURO: No focal deficit    Studies " interpreted by me: Echo:  Moderate to severe aortic valve stenosis-there is mean gradient of 35 mmHg aortic valve area index of 0.45.  Normal LV function.  AI is mild to moderate  Today's encounter addressed an illness with threat to life/bodily function symptomatic severe aortic stenosis  () Today's E/M visit is associated with medical care services that serve as the continuing focal point for all needed health care services and/or with medical care services that  are part of ongoing care related to a patient's single, serious condition, or a complex condition: This includes  furnishing services to patients on an ongoing basis that result in care that is personalized  to the patient. The services result in a comprehensive, longitudinal, and continuous  relationship with the patient and involve delivery of team-based care that is accessible, coordinated with other practitioners and providers, and integrated with the broader health  care landscape.     Past Medical History:   Diagnosis Date    Hypertension      Past Surgical History:   Procedure Laterality Date    ME HYSTEROSCOPY,DX,SEP PROC N/A 10/16/2019    Procedure: HYSTEROSCOPY, DIAGNOSTIC;  Surgeon: Erin Gann M.D.;  Location: SURGERY SAME DAY [facility];  Service: Obstetrics    DILATION AND CURETTAGE N/A 10/16/2019    Procedure: DILATION AND CURETTAGE;  Surgeon: Erin Gann M.D.;  Location: SURGERY SAME DAY [facility];  Service: Obstetrics    APPENDECTOMY      CHOLECYSTECTOMY      TUBAL COAGULATION LAPAROSCOPIC BILATERAL       No Known Allergies  Outpatient Encounter Medications as of 1/29/2025   Medication Sig Dispense Refill    atorvastatin (LIPITOR) 10 MG Tab Take 1 Tablet by mouth every evening. 90 Tablet 3    losartan (COZAAR) 100 MG Tab Take 1 Tablet by mouth at bedtime. 90 Tablet 3    pregabalin (LYRICA) 150 MG Cap Take 1 Capsule by mouth every 8 hours for 180 days. Indications: Neuropathic Pain 270 Capsule 1    estradiol  (ESTRACE) 0.1 MG/GM vaginal cream Insert 0.5 g into the vagina Every Sunday and Wednesday. 42.5 g 5    Calcium Carb-Cholecalciferol (CALCIUM 600+D3) 600-800 MG-UNIT Tab Take  by mouth.      Multiple Vitamins-Minerals (MULTIPLE VITAMINS/WOMENS) Tab Take  by mouth.       No facility-administered encounter medications on file as of 1/29/2025.     Social History     Socioeconomic History    Marital status:      Spouse name: Not on file    Number of children: Not on file    Years of education: Not on file    Highest education level: 12th grade   Occupational History    Not on file   Tobacco Use    Smoking status: Never    Smokeless tobacco: Never   Vaping Use    Vaping status: Never Used   Substance and Sexual Activity    Alcohol use: Yes     Comment: 3 - 4 dirnks per month.     Drug use: No    Sexual activity: Yes     Partners: Male     Birth control/protection: Post-Menopausal     Comment: .    Other Topics Concern    Not on file   Social History Narrative    Not on file     Social Drivers of Health     Financial Resource Strain: Low Risk  (11/14/2024)    Overall Financial Resource Strain (CARDIA)     Difficulty of Paying Living Expenses: Not hard at all   Food Insecurity: No Food Insecurity (11/14/2024)    Hunger Vital Sign     Worried About Running Out of Food in the Last Year: Never true     Ran Out of Food in the Last Year: Never true   Transportation Needs: No Transportation Needs (11/14/2024)    PRAPARE - Transportation     Lack of Transportation (Medical): No     Lack of Transportation (Non-Medical): No   Physical Activity: Insufficiently Active (11/14/2024)    Exercise Vital Sign     Days of Exercise per Week: 3 days     Minutes of Exercise per Session: 10 min   Stress: Stress Concern Present (11/14/2024)    [employer] [place] [employer] - Occupational Stress Questionnaire     Feeling of Stress : To some extent   Social Connections: Moderately Isolated (11/14/2024)    Social Connection  "and Isolation Panel [NHANES]     Frequency of Communication with Friends and Family: More than three times a week     Frequency of Social Gatherings with Friends and Family: Twice a week     Attends J[employer] Services: Never     Active Member of Clubs or Organizations: No     Attends Club or Organization Meetings: Never     Marital Status:    Intimate Partner Violence: Not on file   Housing Stability: Low Risk  (11/14/2024)    Housing Stability Vital Sign     Unable to Pay for Housing in the Last Year: No     Number of Times Moved in the Last Year: 0     Homeless in the Last Year: No     Family History   Problem Relation Age of Onset    Hypertension Mother     Heart Disease Father     Hypertension Father     Colon Cancer Father     Other Sister         Intellectual Disability     No Known Problems Daughter     No Known Problems Daughter          Studies  Lab Results   Component Value Date/Time    CHOLSTRLTOT 203 (H) 11/05/2024 09:16 AM     (H) 11/05/2024 09:16 AM    HDL 54 11/05/2024 09:16 AM    TRIGLYCERIDE 87 11/05/2024 09:16 AM       Lab Results   Component Value Date/Time    SODIUM 144 01/24/2025 10:25 AM    POTASSIUM 4.9 01/24/2025 10:25 AM    CHLORIDE 106 01/24/2025 10:25 AM    CO2 27 01/24/2025 10:25 AM    GLUCOSE 92 01/24/2025 10:25 AM    BUN 12 01/24/2025 10:25 AM    CREATININE 0.83 01/24/2025 10:25 AM      No results found for: \"PROTHROMBTM\", \"INR\"   Lab Results   Component Value Date/Time    WBC 3.6 (L) 01/06/2021 11:05 AM    RBC 4.62 01/06/2021 11:05 AM    HEMOGLOBIN 14.2 01/06/2021 11:05 AM    HEMATOCRIT 43.5 01/06/2021 11:05 AM    MCV 94.2 01/06/2021 11:05 AM    MCH 30.7 01/06/2021 11:05 AM    MCHC 32.6 (L) 01/06/2021 11:05 AM    MPV 10.7 01/06/2021 11:05 AM    NEUTSPOLYS 39.50 (L) 01/06/2021 11:05 AM    LYMPHOCYTES 45.30 (H) 01/06/2021 11:05 AM    MONOCYTES 9.70 01/06/2021 11:05 AM    EOSINOPHILS 4.40 01/06/2021 11:05 AM    BASOPHILS 0.80 01/06/2021 11:05 AM          "

## 2025-01-29 NOTE — PROGRESS NOTES
"Valve Program Consultation: 1/29/2025 for tentative TAVR 2/10/2025    Mental Status Assessment:  Appearance: normal  Behavior: normal  Mood/Affect: normal  Thought process/content: normal  Cognition: normal  Functional ability: normal  Dental Concerns: upper dentures with no lower teeth; patient denies s/s of active gum infection/irritation  Further mental assessment needed: no    Post-op Plan of Care:  Support systems: spouse Tesfaye present at consult today and participating in discussion  Patient understands discharge plan: yes  DME: none  Home health warranted prior to procedure: no  Social concerns for discharge: patient denies drug/ETOH use/abuse  PCP alerted of social concerns: no  POLST: none  Advance directive: none on file; encouraged patient to bring copy for chart  Post-op goal: \"comfort; breath better; decrease fatigue; better quality of life\"  Medication instructions: hold all vitamins and supplements 7 days prior, hold Losartan 1 day prior to procedure    Concerns prior to procedure: LF with normal EF of 60%, elevated lipids    Met with patient during New TAVR consult.     All patient's questions and concerns were addressed during this visit. They understood pre-operative and post-operative plan of care.    Reviewed patient TAVR education packet explaining that information is provided regarding preparation for TAVR the night prior, what to expect during the hospital stay, average LOS, and what to look out for post TAVR discharge. Explained that patient will require SBE prophylactic antibiotic prior to any dental treatment post TAVR. Advised patient not to receive any new vaccinations within 1 week pre- and 1 week post-procedure.     Explained that patient should not eat or drink anything past midnight the day of the procedure. Encouraged patient to wear something clean and comfortable, easy to get on/off to check in Monday morning, time TBD. Patient may have friends and family in the pre-operational " area until patient is taken to the operating room, at which time family and friends will be asked to wait on floor 1 [facility] surgical waiting area. On completion of TAVR, heart team will update family. Once update is given, there is some time before family/friends may visit patient in their assigned room. Length of stay on average is one night, however patient may stay longer depending on specific needs at that time. Patient given printed instructions sheet with all above stated instructions. Patient states understanding of all material and education presented today and has no further questions at this time. Encouraged patient again, to contact me with any questions or concerns during this work-up process. Patient states understanding.

## 2025-01-29 NOTE — PROGRESS NOTES
Valve Program Functional Assessment:     [id number]   1a) Showering/bathin  1b) Walking 1 block on ground: 5  1c) Hurrying or jogging:3  2) Swellin  3) Fatigue: 4  4) Shortness of breath: 5  5) Sleep sitting up: 5  6) Limited enjoyment of life: 4  7) Spend the rest of your life with HF: 4  8a) Hobbies, recreational activities:5  8b) Working or doing household chores:3  8c) Visiting family or friends: 5    5 meter walk test  1) __5.08____ s/5m  2) __5.00____ s/5m  3) __5.13____ s/5m  AVG:___5.07____     Strength   1) _20_____ kg  2) _18_____ kg  3) _18_____ kg  AVG:__18.6____    MEDINA ADLs  Patient independently preforms...   - Bathing: Yes   - Dressing: Yes   - Toileting: Yes   - Transferring: Yes   - Continence: Yes   - Feeding: Yes   Total Score: _6_/6    Living Situation  Patient lives: with spouse    Mobility Aids   Patient uses: cane      FRAILTY SCORE: _0_/ 4

## 2025-01-30 ENCOUNTER — HOSPITAL ENCOUNTER (OUTPATIENT)
Dept: RADIOLOGY | Facility: MEDICAL CENTER | Age: 80
End: 2025-01-30
Attending: NURSE PRACTITIONER
Payer: MEDICARE

## 2025-01-30 ENCOUNTER — APPOINTMENT (OUTPATIENT)
Dept: ADMISSIONS | Facility: MEDICAL CENTER | Age: 80
End: 2025-01-30
Attending: INTERNAL MEDICINE
Payer: MEDICARE

## 2025-01-30 DIAGNOSIS — I35.0 SEVERE AORTIC STENOSIS: ICD-10-CM

## 2025-01-30 DIAGNOSIS — Z01.810 PRE-PROCEDURAL CARDIOVASCULAR EXAMINATION: ICD-10-CM

## 2025-01-30 PROCEDURE — 700117 HCHG RX CONTRAST REV CODE 255: Performed by: NURSE PRACTITIONER

## 2025-01-30 PROCEDURE — 71275 CT ANGIOGRAPHY CHEST: CPT

## 2025-01-30 RX ADMIN — IOHEXOL 100 ML: 350 INJECTION, SOLUTION INTRAVENOUS at 09:00

## 2025-01-31 ENCOUNTER — DOCUMENTATION (OUTPATIENT)
Dept: CARDIOLOGY | Facility: MEDICAL CENTER | Age: 80
End: 2025-01-31
Payer: MEDICARE

## 2025-01-31 NOTE — PROGRESS NOTES
[facility] TAVR Review:    Suboptimal CT, motion artifact, estimated measurements, please use BAV/DIVINE to confirm sizing. Consider a 23mm  S3UR from a right femoral approach  - Severe STJ calcium  - Moderate transverse arch calcium  - Moderate iliac calcium bilaterally  - High left bifurcation  - L8, Ca9

## 2025-02-03 ENCOUNTER — PRE-ADMISSION TESTING (OUTPATIENT)
Dept: ADMISSIONS | Facility: MEDICAL CENTER | Age: 80
End: 2025-02-03
Attending: INTERNAL MEDICINE
Payer: MEDICARE

## 2025-02-03 PROBLEM — I77.4 CELIAC ARTERY STENOSIS (HCC): Status: ACTIVE | Noted: 2025-02-03

## 2025-02-03 PROBLEM — I70.90 ATHEROSCLEROTIC VASCULAR DISEASE: Status: ACTIVE | Noted: 2025-02-03

## 2025-02-03 PROBLEM — I70.1 RENAL ARTERY STENOSIS (HCC): Status: ACTIVE | Noted: 2025-02-03

## 2025-02-03 PROBLEM — I25.10 CORONARY ARTERY DISEASE INVOLVING NATIVE CORONARY ARTERY OF NATIVE HEART WITHOUT ANGINA PECTORIS: Status: ACTIVE | Noted: 2025-02-03

## 2025-02-03 PROBLEM — K83.8 DILATION OF BILIARY TRACT: Status: ACTIVE | Noted: 2025-02-03

## 2025-02-03 PROBLEM — R91.1 PULMONARY NODULE: Status: ACTIVE | Noted: 2025-02-03

## 2025-02-03 NOTE — OR NURSING
Pre-Admit RN appointment completed with pt, by this RN, over the phone, for 2/4/25 & 2/10/25. Discussed pre-procedure instructions, unless they have been otherwise instructed by their surgeon. Discussed post-op expectations for pain, and approximate duration of time in PACU etc. Instructed pt to follow any medication instructions given by cardiology. Pt verbalized understanding of all instructions. Pt denies any questions or concerns.

## 2025-02-04 ENCOUNTER — HOSPITAL ENCOUNTER (OUTPATIENT)
Facility: MEDICAL CENTER | Age: 80
End: 2025-02-04
Attending: INTERNAL MEDICINE | Admitting: INTERNAL MEDICINE
Payer: MEDICARE

## 2025-02-04 ENCOUNTER — APPOINTMENT (OUTPATIENT)
Dept: CARDIOLOGY | Facility: MEDICAL CENTER | Age: 80
End: 2025-02-04
Attending: INTERNAL MEDICINE
Payer: MEDICARE

## 2025-02-04 VITALS
TEMPERATURE: 96.7 F | HEART RATE: 97 BPM | BODY MASS INDEX: 26.63 KG/M2 | SYSTOLIC BLOOD PRESSURE: 145 MMHG | RESPIRATION RATE: 20 BRPM | DIASTOLIC BLOOD PRESSURE: 61 MMHG | OXYGEN SATURATION: 93 % | WEIGHT: 175.71 LBS | HEIGHT: 68 IN

## 2025-02-04 DIAGNOSIS — I35.0 SEVERE AORTIC STENOSIS: ICD-10-CM

## 2025-02-04 DIAGNOSIS — Z01.810 PRE-PROCEDURAL CARDIOVASCULAR EXAMINATION: ICD-10-CM

## 2025-02-04 LAB
ALBUMIN SERPL BCP-MCNC: 4 G/DL (ref 3.2–4.9)
ALBUMIN/GLOB SERPL: 1.1 G/DL
ALP SERPL-CCNC: 100 U/L (ref 30–99)
ALT SERPL-CCNC: 22 U/L (ref 2–50)
ANION GAP SERPL CALC-SCNC: 10 MMOL/L (ref 7–16)
APTT PPP: 25.3 SEC (ref 24.7–36)
AST SERPL-CCNC: 34 U/L (ref 12–45)
BILIRUB SERPL-MCNC: 0.7 MG/DL (ref 0.1–1.5)
BUN SERPL-MCNC: 13 MG/DL (ref 8–22)
CALCIUM ALBUM COR SERPL-MCNC: 9.3 MG/DL (ref 8.5–10.5)
CALCIUM SERPL-MCNC: 9.3 MG/DL (ref 8.5–10.5)
CALCULATED OXYGEN CONTENT: 13.6
CALCULATED OXYGEN CONTENT: 17.8
CHLORIDE SERPL-SCNC: 106 MMOL/L (ref 96–112)
CO2 SERPL-SCNC: 25 MMOL/L (ref 20–33)
CREAT SERPL-MCNC: 0.81 MG/DL (ref 0.5–1.4)
EKG IMPRESSION: NORMAL
EKG IMPRESSION: NORMAL
ERYTHROCYTE [DISTWIDTH] IN BLOOD BY AUTOMATED COUNT: 43.6 FL (ref 35.9–50)
GFR SERPLBLD CREATININE-BSD FMLA CKD-EPI: 75 ML/MIN/1.73 M 2
GLOBULIN SER CALC-MCNC: 3.8 G/DL (ref 1.9–3.5)
GLUCOSE SERPL-MCNC: 101 MG/DL (ref 65–99)
HCT VFR BLD AUTO: 39.6 % (ref 37–47)
HGB BLD-MCNC: 13.7 G/DL (ref 12–16)
INR PPP: 1 (ref 0.87–1.13)
MCH RBC QN AUTO: 31.2 PG (ref 27–33)
MCHC RBC AUTO-ENTMCNC: 34.6 G/DL (ref 32.2–35.5)
MCV RBC AUTO: 90.2 FL (ref 81.4–97.8)
NT-PROBNP SERPL IA-MCNC: 158 PG/ML (ref 0–125)
OXYHEMOGLOBIN: 74.4
OXYHEMOGLOBIN: 95.7
PLATELET # BLD AUTO: 197 K/UL (ref 164–446)
PMV BLD AUTO: 9.6 FL (ref 9–12.9)
POTASSIUM SERPL-SCNC: 3.8 MMOL/L (ref 3.6–5.5)
PROT SERPL-MCNC: 7.8 G/DL (ref 6–8.2)
PROTHROMBIN TIME: 13.2 SEC (ref 12–14.6)
RBC # BLD AUTO: 4.39 M/UL (ref 4.2–5.4)
SODIUM SERPL-SCNC: 141 MMOL/L (ref 135–145)
TOTAL HEMOGLOBIN: 13.2
TOTAL HEMOGLOBIN: 13.4
WBC # BLD AUTO: 3.8 K/UL (ref 4.8–10.8)

## 2025-02-04 PROCEDURE — 99153 MOD SED SAME PHYS/QHP EA: CPT

## 2025-02-04 PROCEDURE — 160036 HCHG PACU - EA ADDL 30 MINS PHASE I

## 2025-02-04 PROCEDURE — A9270 NON-COVERED ITEM OR SERVICE: HCPCS

## 2025-02-04 PROCEDURE — 93010 ELECTROCARDIOGRAM REPORT: CPT | Mod: 59 | Performed by: INTERNAL MEDICINE

## 2025-02-04 PROCEDURE — 85610 PROTHROMBIN TIME: CPT

## 2025-02-04 PROCEDURE — 700111 HCHG RX REV CODE 636 W/ 250 OVERRIDE (IP): Mod: JZ

## 2025-02-04 PROCEDURE — 75630 X-RAY AORTA LEG ARTERIES: CPT | Mod: 59 | Performed by: INTERNAL MEDICINE

## 2025-02-04 PROCEDURE — 85027 COMPLETE CBC AUTOMATED: CPT

## 2025-02-04 PROCEDURE — 93460 R&L HRT ART/VENTRICLE ANGIO: CPT | Performed by: INTERNAL MEDICINE

## 2025-02-04 PROCEDURE — 82810 BLOOD GASES O2 SAT ONLY: CPT | Performed by: INTERNAL MEDICINE

## 2025-02-04 PROCEDURE — 700117 HCHG RX CONTRAST REV CODE 255: Performed by: INTERNAL MEDICINE

## 2025-02-04 PROCEDURE — 160047 HCHG PACU  - EA ADDL 30 MINS PHASE II

## 2025-02-04 PROCEDURE — 160046 HCHG PACU - 1ST 60 MINS PHASE II

## 2025-02-04 PROCEDURE — 99152 MOD SED SAME PHYS/QHP 5/>YRS: CPT | Performed by: INTERNAL MEDICINE

## 2025-02-04 PROCEDURE — 700101 HCHG RX REV CODE 250

## 2025-02-04 PROCEDURE — 700105 HCHG RX REV CODE 258: Performed by: INTERNAL MEDICINE

## 2025-02-04 PROCEDURE — 160002 HCHG RECOVERY MINUTES (STAT)

## 2025-02-04 PROCEDURE — 160035 HCHG PACU - 1ST 60 MINS PHASE I

## 2025-02-04 PROCEDURE — 85730 THROMBOPLASTIN TIME PARTIAL: CPT

## 2025-02-04 PROCEDURE — 83880 ASSAY OF NATRIURETIC PEPTIDE: CPT

## 2025-02-04 PROCEDURE — 93005 ELECTROCARDIOGRAM TRACING: CPT | Mod: TC | Performed by: INTERNAL MEDICINE

## 2025-02-04 PROCEDURE — 700102 HCHG RX REV CODE 250 W/ 637 OVERRIDE(OP)

## 2025-02-04 PROCEDURE — 93567 NJX CAR CTH SPRVLV AORTGRPHY: CPT | Performed by: INTERNAL MEDICINE

## 2025-02-04 PROCEDURE — 80053 COMPREHEN METABOLIC PANEL: CPT

## 2025-02-04 PROCEDURE — 93463 DRUG ADMIN & HEMODYNMIC MEAS: CPT | Performed by: INTERNAL MEDICINE

## 2025-02-04 RX ORDER — HEPARIN SODIUM 1000 [USP'U]/ML
INJECTION, SOLUTION INTRAVENOUS; SUBCUTANEOUS
Status: COMPLETED
Start: 2025-02-04 | End: 2025-02-04

## 2025-02-04 RX ORDER — HEPARIN SODIUM 200 [USP'U]/100ML
INJECTION, SOLUTION INTRAVENOUS
Status: COMPLETED
Start: 2025-02-04 | End: 2025-02-04

## 2025-02-04 RX ORDER — HYDRALAZINE HYDROCHLORIDE 20 MG/ML
INJECTION INTRAMUSCULAR; INTRAVENOUS
Status: DISCONTINUED
Start: 2025-02-04 | End: 2025-02-04 | Stop reason: HOSPADM

## 2025-02-04 RX ORDER — ASPIRIN 81 MG/1
TABLET, CHEWABLE ORAL
Status: COMPLETED
Start: 2025-02-04 | End: 2025-02-04

## 2025-02-04 RX ORDER — VERAPAMIL HYDROCHLORIDE 2.5 MG/ML
INJECTION, SOLUTION INTRAVENOUS
Status: COMPLETED
Start: 2025-02-04 | End: 2025-02-04

## 2025-02-04 RX ORDER — MIDAZOLAM HYDROCHLORIDE 1 MG/ML
INJECTION INTRAMUSCULAR; INTRAVENOUS
Status: COMPLETED
Start: 2025-02-04 | End: 2025-02-04

## 2025-02-04 RX ORDER — LIDOCAINE HYDROCHLORIDE 20 MG/ML
INJECTION, SOLUTION INFILTRATION; PERINEURAL
Status: COMPLETED
Start: 2025-02-04 | End: 2025-02-04

## 2025-02-04 RX ORDER — HYDRALAZINE HYDROCHLORIDE 20 MG/ML
INJECTION INTRAMUSCULAR; INTRAVENOUS
Status: COMPLETED
Start: 2025-02-04 | End: 2025-02-04

## 2025-02-04 RX ORDER — PHENYLEPHRINE HCL IN 0.9% NACL 1 MG/10 ML
100 SYRINGE (ML) INTRAVENOUS ONCE
Status: COMPLETED | OUTPATIENT
Start: 2025-02-04 | End: 2025-02-04

## 2025-02-04 RX ORDER — PHENYLEPHRINE HCL IN 0.9% NACL 1 MG/10 ML
SYRINGE (ML) INTRAVENOUS
Status: COMPLETED
Start: 2025-02-04 | End: 2025-02-04

## 2025-02-04 RX ORDER — SODIUM CHLORIDE 9 MG/ML
1.5 INJECTION, SOLUTION INTRAVENOUS CONTINUOUS
Status: DISCONTINUED | OUTPATIENT
Start: 2025-02-04 | End: 2025-02-04 | Stop reason: HOSPADM

## 2025-02-04 RX ADMIN — HEPARIN SODIUM 2000 UNITS: 200 INJECTION, SOLUTION INTRAVENOUS at 07:52

## 2025-02-04 RX ADMIN — Medication 100 MCG: at 09:50

## 2025-02-04 RX ADMIN — FENTANYL CITRATE 100 MCG: 50 INJECTION, SOLUTION INTRAMUSCULAR; INTRAVENOUS at 08:50

## 2025-02-04 RX ADMIN — IOHEXOL 60 ML: 350 INJECTION, SOLUTION INTRAVENOUS at 08:57

## 2025-02-04 RX ADMIN — HEPARIN SODIUM: 1000 INJECTION, SOLUTION INTRAVENOUS; SUBCUTANEOUS at 07:52

## 2025-02-04 RX ADMIN — VERAPAMIL HYDROCHLORIDE 2.5 MG: 2.5 INJECTION, SOLUTION INTRAVENOUS at 07:52

## 2025-02-04 RX ADMIN — NITROGLYCERIN 10 ML: 20 INJECTION INTRAVENOUS at 07:53

## 2025-02-04 RX ADMIN — ASPIRIN 324 MG: 81 TABLET, CHEWABLE ORAL at 07:49

## 2025-02-04 RX ADMIN — MIDAZOLAM HYDROCHLORIDE 2 MG: 1 INJECTION, SOLUTION INTRAMUSCULAR; INTRAVENOUS at 08:50

## 2025-02-04 RX ADMIN — SODIUM CHLORIDE 1.5 ML/KG/HR: 9 INJECTION, SOLUTION INTRAVENOUS at 09:36

## 2025-02-04 RX ADMIN — LIDOCAINE HYDROCHLORIDE: 20 INJECTION, SOLUTION INFILTRATION; PERINEURAL at 07:52

## 2025-02-04 RX ADMIN — HYDRALAZINE HYDROCHLORIDE 20 MG: 20 INJECTION INTRAMUSCULAR; INTRAVENOUS at 08:50

## 2025-02-04 ASSESSMENT — PAIN DESCRIPTION - PAIN TYPE
TYPE: ACUTE PAIN

## 2025-02-04 NOTE — OR NURSING
0920 Patient arrived to unit from Cath Lab.  Report from RN.  Patient awake and alert denies discomfort at this time. Pt placed on monitor. On room air. TR band to right radial. Small hematoma to right brachial. Dressing to right IJ is clean, dry and intact.  Ordered fluids started.  Sips of water provided. Patient updated on plan of care.    0941 Pt complained of some numbness to right hand from TR band, 1 cc of air removed from band. Site clean and dry. Pt began to complain of chest tightness to mid sternum. Pt placed on 2L O2 via nasal cannula. Blood pressure dropped to 87/40.    0945 PA notified of pt low BP and complaints of chest tightness. Orders received for EKG and allison.     0950 Pt medicate per order, see MAR. Repositioned in bed.   0952 PA at bedside  0956 EKG completed. PA at bedside and looked at EKG. Pt in sinus rhythm no changes.   1000 PA did US. Negative for fluid. 1 cc of air removed from TR band. Site clean and dry. Right brachial site stable. Dressing to right IJ clean and dry.  1005 Pt states pain has decreased to 2/10. Denies other discomfort.   1013 , Tesfaye called and updated of patient arrival to unit. All questions asked. Pt asked to have  wait before visiting since she wants to rest.   1020 2 cc of air removed from TR band. Site clean and dry.  1050 2 cc of air removed from TR band site clean and dry. Pt denies numbness to right hand. Denies any chest tightness. Pt on 1 L O2 tolerating well.  1100  at bedside. Updated with POC.   1120 2 cc of air removed from TR band. Site dry. Pt tolerating well. Pt on room air. Tolerating well.  1144 2 cc of air removed from TR band. Site clean and dry. Pt tolerated well. Eating snack.   1200 2 cc of air removed from TR band. Site clean and dry  1218 TR band removed, site clean and dry. Gauze and transparent dressing applied. Pt tolerated well.  1230 Pt walked around unit with RN. Tolerated well. Belongings Returned to patient.  1243  Access sites clean and dry.  All lines and monitors discontinued. Reviewed discharge paperwork with pt and spouse. Discussed diet, activity, medications, follow up care and worsening symptoms. No questions at this time.  0798 Patient taken down via wheelchair by CNA to meet spouse.    ---

## 2025-02-04 NOTE — PROCEDURES
"CARDIAC CATHETERIZATION REPORT    REFERRING: Abad Nava M.D.    PROCEDURE PHYSICIAN: Abad Nava MD, [facility], [facility]  ASSISTANT: None    IMPRESSIONS:  1.  Severe aortic stenosis  2.  Nonobstructive coronary disease  3.  Favorable anatomy for TAVR  4.  Preserved cardiac output  5.  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 The patient tolerated sedation without complication.     Procedure Description  1. Access: 5/6 Kiswahili right radial artery and internal jugular vein Micropuncture technique was utilized following local anesthesia with lidocaine.  Fluoroscopic guidance was utilized for femoral access Dynamic ultrasound was utilized to gain access    2. Diagnostic description: The catheter was passed to the central circulation with the aide of J tipped 0.35\" wire. A 5F TIG 4.0, 6F Pigtail, and Dual lumen Vernon Pigtail catheter.  were used to inject the coronary circulationand simultaneous LV and aortic pressures were recorded  and inject the ascending aorta  and inject the abdominal aorta aortography performed for surgical planning purposes.  and a balloon tipped catheter was passed intravenously through the right heart chambers into wedge position, obtaining hemodynamic information.  During simultaneous LV and aortic pressure recording the aortic pressure was noted to be severely elevated and I used intravenous vasodilators-nitroglycerin as well as hydralazine to bring " the systolic blood pressure down and then we recorded gradient across aortic valve.    3. Closing: At completion of the procedure the relevant equipment was removed from the body and hemostasis achieved by Radial band and Manual Compression    Findings   Hemodynamics (BASAL CONDITION):   Aorta: 210/104 mmHg  LVEDP: 29 mmHg  RA: 5 mmHg  RV: 45/9 mmHg  PA: 45/18/31 mmHg  PCWP: 18 mmHg  Cardiac Output/Index (thermodilution): 4.8 L/min, 2.5 L/min/m2  Cardiac Output/Index (Astrid): 4.5 L/min  Aortic valve mean gradient: 25 mmHg  NINFA: 0.9 cm2    Hemodynamics (Vasodilator Condition):  Aorta: 110/70  Aortic valve mean gradient: 37 mmHg  NINFA: 0.78 cm2      Coronary Anatomy   Left Main: Normal   LAD: 40% proximal stenosis, 50% mid stenosis   LCx: Minimal luminal irregularities   RCA: Dominant, Minimal luminal irregularities     Supravalvular aortogram:  Normal caliber ascending aorta, with marked calcification at the left-sided STJ.  There is greater than 1 cm coronary height bilaterally.  No significant AI    Abdominal aorta/iliac angiogram:  Minimal atherosclerosis at the aortic bifurcation but otherwise widely patent bilateral iliofemoral system with normal common femoral branching pattern bilaterally    Technical Factors  1. Complications: None  2. Estimated Blood Loss: <50 cc  3. Specimens: None  4. Contrast Volume: 60 ml  5. Medications: Radial cocktail (Verapamil 2.5 mg, Nitroglycerin 100 mcg) Heparin 5000 Units

## 2025-02-04 NOTE — DISCHARGE INSTRUCTIONS
What to Expect Post Sedation    Rest and take it easy for the first 24 hours.  A responsible adult is recommended to remain with you during that time.  It is normal to feel sleepy.  We encourage you to not do anything that requires balance, judgment or coordination.    FOR 24 HOURS DO NOT:  Drive, operate machinery or run household appliances.  Drink beer or alcoholic beverages.  Make important decisions or sign legal documents.    To avoid nausea, slowly advance diet as tolerated, avoiding spicy or greasy foods for the first day.  Add more substantial food to your diet according to your provider's instructions.  INCREASE FLUIDS AND FIBER TO AVOID CONSTIPATION.    MILD FLU-LIKE SYMPTOMS ARE NORMAL.  YOU MAY EXPERIENCE GENERALIZED MUSCLE ACHES, THROAT IRRITATION, HEADACHE AND/OR SOME NAUSEA.  If any questions arise, call your provider.  If your provider is not available, please feel free to call the Surgical Center at [phone].    MEDICATIONS: Resume taking daily medication.  Take prescribed pain medication with food.  If no medication is prescribed, you may take non-aspirin pain medication if needed.  PAIN MEDICATION CAN BE VERY CONSTIPATING.  Take a stool softener or laxative such as senokot, pericolace, or milk of magnesia if needed.    Diet    Resume your normal diet as tolerated.  A diet low in cholesterol, fat, and sodium is recommended for good health.     Discharge Instructions:  POST ANGIOGRAM  General Care Instructions  Maintain a bandage over the incision site for 24 hours.  It's normal to find a small bruise or dime-sized lump at the insertion site. This should disappear within a few weeks.  Do not apply lotions or powders to the site.  Do not immerse the catheter insertion site in water (bathtub/swimming) for five days. It is ok to shower 24 hours after the procedure.  You may resume your normal diet immediately; on the day of your procedure, drink 6-10 glasses of water to help flush the contrast  liquid out of your system.  If the doctor inserted the catheter in your arm:  For 5 days, DO NOT lift anything heavier than 10 pounds (approximately a gallon of milk). DO NOT do any heavy pushing, pulling, or twisting.    Medications  If your current medications need to be changed, you will be provided with an updated list of your medications prior to discharge.  If you take warfarin (Coumadin), resume taking your usual dose the evening after the procedure.  DO NOT STOP taking prescribed blood thinning (anti-platelet) medications unless instructed by your cardiologist.  These medications include:  Aspirin, Clopidogrel (Plavix), Ticagrelor (Brilinta), or Prasugrel (Effient)   If you take one of the following anticoagulants, RESUME 24 HOURS after your procedure:  Apixiban (Eliquis), Rivaroxaban (Xarelto), Dabigatran (Pradaxa), Edoxaban (Savaysa)  If you take metformin (Glucophage), RESUME 48 HOURS after your procedure.    When to call your healthcare provider  Call your cardiologist right away at [phone] if you have any of the following:   Problems/Concerns taking any of your prescribed heart medicines.   The insertion site has increasing pain, swelling, redness, bleeding, or drainage.   Your arm or leg below where the insertion site changes color, is cool, or is numb.   You have chest pain or shortness of breath that does not go away with rest.   Your pulse feels irregular -- very slow (less than 60 beats/minute) or very fast (over 100 beats/minute).   You have dizziness, fainting, or you are very tired.   You are coughing up blood or yellow or green mucus.   You have chills or a fever over 101°F (38.3°C).    If there is bleeding at the catheter insertion site, apply pressure for 10 minutes.  If bleeding persists, call 911, and continue to hold pressure until advanced medical support arrives.    If you smoke, quit!  If your doctor has been urging you to quit smoking, it's for good reasons. Smoking damages your  heart, blood vessels, and lungs. The good news is that quitting can halt or even reverse the damage of smoking. To quit now:  Get medical help. Ask your doctor for advice on stop-smoking programs. Also ask about medications or nicotine replacement therapy products that may help you quit smoking.  Get support. Join a support group. Ask for help from your family and friends.  Don't give up. It often takes several tries to succeed in quitting smoking.  Avoid secondhand smoke. Ask family and friends not to smoke around you.    DRESSING: Keep dressing and incision dry and intact for 24 hours, may remove dressing and shower after 1 PM on 2/5, do not need to replace.  Do not submerge site in water for 7 days.     BOWEL FUNCTION:  If you are having problems, use what you normally would or call your provider for suggestions. It also helps to stay regular by including fiber in your diet (for example: bran or fruits and vegetables) and drink plenty of liquids (water, juice, etc.).

## 2025-02-05 ENCOUNTER — TELEPHONE (OUTPATIENT)
Dept: CARDIOLOGY | Facility: MEDICAL CENTER | Age: 80
End: 2025-02-05
Payer: MEDICARE

## 2025-02-05 NOTE — TELEPHONE ENCOUNTER
Valve Conference Plan of Care: 2/5/2025 for TAVR 2/10/2025           TAVR Candidate: Yes  Access: right femoral  Valve Size: 23mm  General Anesthesia or MAC: MAC  Unit: Telemetry  Incidental findings to be discussed with PCP and sent to Dr. Graham Kim: 9X6 mm LLL pulmonary nodule (FOLLOW UP SUGGESTED), mild-moderate intrahepatic and extra hepatic biliary dilatation, and splenorenal shunt noted.    Clearance needed prior to procedure: No    Check in time of 0530 2/10/2025.     Pre-op appointment completed: Yes    NPO after midnight. Hold all vitamins and supplements 7 days prior, hold Losartan 1 day prior to procedure.     Colonoscopy follow up is recommended due to CT results.

## 2025-02-05 NOTE — TELEPHONE ENCOUNTER
Called patient and reviewed her concerns about LVH and first degree block on her EKG as well as bruising on her arms. We discussed that this is not a concern to postpone her procedure, but it was ultimately her decision, she agreed to proceed on Monday after further discussion.    Check in time of 0530 2/10/2025.      Pre-op appointment completed: Yes     NPO after midnight. Hold all vitamins and supplements 7 days prior, hold Losartan 1 day prior to procedure.      Colonoscopy follow up is recommended due to CT results. Dr. Kim will be alerted of this recommendation post procedure.

## 2025-02-06 ENCOUNTER — DOCUMENTATION (OUTPATIENT)
Dept: CARDIOLOGY | Facility: MEDICAL CENTER | Age: 80
End: 2025-02-06
Payer: MEDICARE

## 2025-02-06 DIAGNOSIS — I35.0 NONRHEUMATIC AORTIC (VALVE) STENOSIS: ICD-10-CM

## 2025-02-06 NOTE — PROGRESS NOTES
Thank you for the opportunity to participate in your patient's care.     Your patient is scheduled for transcatheter aortic valve replacement (TAVR) on Monday, February 10, 2025.    Sincerely,    [facility]    TAPAN Rhodes, RN, Structural Heart Program Nurse Coordinator ([phone])  TAPAN Ponce, RN, Structural Heart Program Nurse Coordinator ([phone])

## 2025-02-10 ENCOUNTER — APPOINTMENT (OUTPATIENT)
Dept: CARDIOLOGY | Facility: MEDICAL CENTER | Age: 80
DRG: 266 | End: 2025-02-10
Attending: INTERNAL MEDICINE
Payer: MEDICARE

## 2025-02-10 ENCOUNTER — ANESTHESIA (OUTPATIENT)
Dept: SURGERY | Facility: MEDICAL CENTER | Age: 80
DRG: 266 | End: 2025-02-10
Payer: MEDICARE

## 2025-02-10 ENCOUNTER — HOSPITAL ENCOUNTER (INPATIENT)
Facility: MEDICAL CENTER | Age: 80
LOS: 1 days | DRG: 266 | End: 2025-02-11
Attending: INTERNAL MEDICINE | Admitting: INTERNAL MEDICINE
Payer: MEDICARE

## 2025-02-10 ENCOUNTER — ANESTHESIA EVENT (OUTPATIENT)
Dept: SURGERY | Facility: MEDICAL CENTER | Age: 80
DRG: 266 | End: 2025-02-10
Payer: MEDICARE

## 2025-02-10 ENCOUNTER — APPOINTMENT (OUTPATIENT)
Dept: RADIOLOGY | Facility: MEDICAL CENTER | Age: 80
DRG: 266 | End: 2025-02-10
Attending: NURSE PRACTITIONER
Payer: MEDICARE

## 2025-02-10 DIAGNOSIS — I35.0 SEVERE AORTIC STENOSIS: ICD-10-CM

## 2025-02-10 DIAGNOSIS — Z95.2 S/P TAVR (TRANSCATHETER AORTIC VALVE REPLACEMENT): ICD-10-CM

## 2025-02-10 DIAGNOSIS — I10 ESSENTIAL HYPERTENSION: ICD-10-CM

## 2025-02-10 PROBLEM — R94.30 ELEVATED LEFT VENTRICULAR END-DIASTOLIC PRESSURE (LVEDP): Status: ACTIVE | Noted: 2025-02-10

## 2025-02-10 LAB
ABO + RH BLD: NORMAL
ABO GROUP BLD: NORMAL
ACT BLD: 262 SEC (ref 74–137)
ALBUMIN SERPL BCP-MCNC: 3.2 G/DL (ref 3.2–4.9)
ALBUMIN/GLOB SERPL: 1.1 G/DL
ALP SERPL-CCNC: 80 U/L (ref 30–99)
ALT SERPL-CCNC: 17 U/L (ref 2–50)
ANION GAP SERPL CALC-SCNC: 9 MMOL/L (ref 7–16)
AST SERPL-CCNC: 32 U/L (ref 12–45)
BILIRUB SERPL-MCNC: 0.4 MG/DL (ref 0.1–1.5)
BLD GP AB SCN SERPL QL: NORMAL
BUN SERPL-MCNC: 12 MG/DL (ref 8–22)
CALCIUM ALBUM COR SERPL-MCNC: 9.1 MG/DL (ref 8.5–10.5)
CALCIUM SERPL-MCNC: 8.5 MG/DL (ref 8.5–10.5)
CHLORIDE SERPL-SCNC: 106 MMOL/L (ref 96–112)
CO2 SERPL-SCNC: 24 MMOL/L (ref 20–33)
CREAT SERPL-MCNC: 0.82 MG/DL (ref 0.5–1.4)
EKG IMPRESSION: NORMAL
ERYTHROCYTE [DISTWIDTH] IN BLOOD BY AUTOMATED COUNT: 43.8 FL (ref 35.9–50)
GFR SERPLBLD CREATININE-BSD FMLA CKD-EPI: 74 ML/MIN/1.73 M 2
GLOBULIN SER CALC-MCNC: 3 G/DL (ref 1.9–3.5)
GLUCOSE SERPL-MCNC: 95 MG/DL (ref 65–99)
HCT VFR BLD AUTO: 33.5 % (ref 37–47)
HGB BLD-MCNC: 11.6 G/DL (ref 12–16)
MCH RBC QN AUTO: 31.3 PG (ref 27–33)
MCHC RBC AUTO-ENTMCNC: 34.6 G/DL (ref 32.2–35.5)
MCV RBC AUTO: 90.3 FL (ref 81.4–97.8)
NT-PROBNP SERPL IA-MCNC: 334 PG/ML (ref 0–125)
PLATELET # BLD AUTO: 164 K/UL (ref 164–446)
PMV BLD AUTO: 10.1 FL (ref 9–12.9)
POTASSIUM SERPL-SCNC: 3.8 MMOL/L (ref 3.6–5.5)
PROT SERPL-MCNC: 6.2 G/DL (ref 6–8.2)
RBC # BLD AUTO: 3.71 M/UL (ref 4.2–5.4)
RH BLD: NORMAL
SODIUM SERPL-SCNC: 139 MMOL/L (ref 135–145)
WBC # BLD AUTO: 3.5 K/UL (ref 4.8–10.8)

## 2025-02-10 PROCEDURE — C1769 GUIDE WIRE: HCPCS | Performed by: INTERNAL MEDICINE

## 2025-02-10 PROCEDURE — 160031 HCHG SURGERY MINUTES - 1ST 30 MINS LEVEL 5: Performed by: INTERNAL MEDICINE

## 2025-02-10 PROCEDURE — 83880 ASSAY OF NATRIURETIC PEPTIDE: CPT

## 2025-02-10 PROCEDURE — 700111 HCHG RX REV CODE 636 W/ 250 OVERRIDE (IP): Mod: JZ | Performed by: NURSE PRACTITIONER

## 2025-02-10 PROCEDURE — 700111 HCHG RX REV CODE 636 W/ 250 OVERRIDE (IP): Performed by: INTERNAL MEDICINE

## 2025-02-10 PROCEDURE — C1751 CATH, INF, PER/CENT/MIDLINE: HCPCS | Performed by: INTERNAL MEDICINE

## 2025-02-10 PROCEDURE — 700101 HCHG RX REV CODE 250: Performed by: ANESTHESIOLOGY

## 2025-02-10 PROCEDURE — 86901 BLOOD TYPING SEROLOGIC RH(D): CPT | Mod: 91

## 2025-02-10 PROCEDURE — 160048 HCHG OR STATISTICAL LEVEL 1-5: Performed by: INTERNAL MEDICINE

## 2025-02-10 PROCEDURE — 700111 HCHG RX REV CODE 636 W/ 250 OVERRIDE (IP): Performed by: ANESTHESIOLOGY

## 2025-02-10 PROCEDURE — 700117 HCHG RX CONTRAST REV CODE 255: Performed by: INTERNAL MEDICINE

## 2025-02-10 PROCEDURE — 86900 BLOOD TYPING SEROLOGIC ABO: CPT

## 2025-02-10 PROCEDURE — B3101ZZ FLUOROSCOPY OF THORACIC AORTA USING LOW OSMOLAR CONTRAST: ICD-10-PCS | Performed by: THORACIC SURGERY (CARDIOTHORACIC VASCULAR SURGERY)

## 2025-02-10 PROCEDURE — C1760 CLOSURE DEV, VASC: HCPCS | Performed by: INTERNAL MEDICINE

## 2025-02-10 PROCEDURE — 110372 HCHG SHELL REV 278: Performed by: INTERNAL MEDICINE

## 2025-02-10 PROCEDURE — B24BZZ4 ULTRASONOGRAPHY OF HEART WITH AORTA, TRANSESOPHAGEAL: ICD-10-PCS | Performed by: THORACIC SURGERY (CARDIOTHORACIC VASCULAR SURGERY)

## 2025-02-10 PROCEDURE — 700105 HCHG RX REV CODE 258: Performed by: ANESTHESIOLOGY

## 2025-02-10 PROCEDURE — 770020 HCHG ROOM/CARE - TELE (206)

## 2025-02-10 PROCEDURE — 80053 COMPREHEN METABOLIC PANEL: CPT

## 2025-02-10 PROCEDURE — 700105 HCHG RX REV CODE 258: Performed by: NURSE PRACTITIONER

## 2025-02-10 PROCEDURE — 93010 ELECTROCARDIOGRAM REPORT: CPT | Mod: Q0 | Performed by: INTERNAL MEDICINE

## 2025-02-10 PROCEDURE — 700102 HCHG RX REV CODE 250 W/ 637 OVERRIDE(OP): Performed by: NURSE PRACTITIONER

## 2025-02-10 PROCEDURE — A9270 NON-COVERED ITEM OR SERVICE: HCPCS | Performed by: NURSE PRACTITIONER

## 2025-02-10 PROCEDURE — C1887 CATHETER, GUIDING: HCPCS | Performed by: INTERNAL MEDICINE

## 2025-02-10 PROCEDURE — 85027 COMPLETE CBC AUTOMATED: CPT

## 2025-02-10 PROCEDURE — 36415 COLL VENOUS BLD VENIPUNCTURE: CPT

## 2025-02-10 PROCEDURE — 02RF38Z REPLACEMENT OF AORTIC VALVE WITH ZOOPLASTIC TISSUE, PERCUTANEOUS APPROACH: ICD-10-PCS | Performed by: INTERNAL MEDICINE

## 2025-02-10 PROCEDURE — 503001 HCHG PERFUSION: Performed by: INTERNAL MEDICINE

## 2025-02-10 PROCEDURE — 85347 COAGULATION TIME ACTIVATED: CPT

## 2025-02-10 PROCEDURE — 86850 RBC ANTIBODY SCREEN: CPT

## 2025-02-10 PROCEDURE — 160009 HCHG ANES TIME/MIN: Performed by: INTERNAL MEDICINE

## 2025-02-10 PROCEDURE — C1883 ADAPT/EXT, PACING/NEURO LEAD: HCPCS | Performed by: INTERNAL MEDICINE

## 2025-02-10 PROCEDURE — 700101 HCHG RX REV CODE 250: Performed by: INTERNAL MEDICINE

## 2025-02-10 PROCEDURE — 700111 HCHG RX REV CODE 636 W/ 250 OVERRIDE (IP): Mod: JZ | Performed by: ANESTHESIOLOGY

## 2025-02-10 PROCEDURE — 700105 HCHG RX REV CODE 258: Performed by: INTERNAL MEDICINE

## 2025-02-10 PROCEDURE — 160002 HCHG RECOVERY MINUTES (STAT): Performed by: INTERNAL MEDICINE

## 2025-02-10 PROCEDURE — B41F1ZZ FLUOROSCOPY OF RIGHT LOWER EXTREMITY ARTERIES USING LOW OSMOLAR CONTRAST: ICD-10-PCS | Performed by: THORACIC SURGERY (CARDIOTHORACIC VASCULAR SURGERY)

## 2025-02-10 PROCEDURE — 33361 REPLACE AORTIC VALVE PERQ: CPT | Mod: 62,Q0 | Performed by: THORACIC SURGERY (CARDIOTHORACIC VASCULAR SURGERY)

## 2025-02-10 PROCEDURE — 33361 REPLACE AORTIC VALVE PERQ: CPT | Mod: 62,Q0 | Performed by: INTERNAL MEDICINE

## 2025-02-10 PROCEDURE — C1894 INTRO/SHEATH, NON-LASER: HCPCS | Performed by: INTERNAL MEDICINE

## 2025-02-10 PROCEDURE — 71045 X-RAY EXAM CHEST 1 VIEW: CPT

## 2025-02-10 PROCEDURE — 93005 ELECTROCARDIOGRAM TRACING: CPT | Mod: TC | Performed by: NURSE PRACTITIONER

## 2025-02-10 PROCEDURE — 160035 HCHG PACU - 1ST 60 MINS PHASE I: Performed by: INTERNAL MEDICINE

## 2025-02-10 PROCEDURE — 93355 ECHO TRANSESOPHAGEAL (TEE): CPT

## 2025-02-10 PROCEDURE — 160042 HCHG SURGERY MINUTES - EA ADDL 1 MIN LEVEL 5: Performed by: INTERNAL MEDICINE

## 2025-02-10 DEVICE — IMPLANTABLE DEVICE: Type: IMPLANTABLE DEVICE | Site: HEART | Status: FUNCTIONAL

## 2025-02-10 RX ORDER — OXYCODONE HCL 5 MG/5 ML
10 SOLUTION, ORAL ORAL
Status: DISCONTINUED | OUTPATIENT
Start: 2025-02-10 | End: 2025-02-10 | Stop reason: HOSPADM

## 2025-02-10 RX ORDER — SODIUM PHOSPHATE,MONO-DIBASIC 19G-7G/118
1 ENEMA (ML) RECTAL
Status: DISCONTINUED | OUTPATIENT
Start: 2025-02-10 | End: 2025-02-11 | Stop reason: HOSPADM

## 2025-02-10 RX ORDER — SODIUM CHLORIDE, SODIUM LACTATE, POTASSIUM CHLORIDE, CALCIUM CHLORIDE 600; 310; 30; 20 MG/100ML; MG/100ML; MG/100ML; MG/100ML
INJECTION, SOLUTION INTRAVENOUS
Status: DISCONTINUED | OUTPATIENT
Start: 2025-02-10 | End: 2025-02-10 | Stop reason: SURG

## 2025-02-10 RX ORDER — HYDRALAZINE HYDROCHLORIDE 20 MG/ML
10 INJECTION INTRAMUSCULAR; INTRAVENOUS
Status: DISCONTINUED | OUTPATIENT
Start: 2025-02-10 | End: 2025-02-11 | Stop reason: HOSPADM

## 2025-02-10 RX ORDER — ALBUTEROL SULFATE 5 MG/ML
2.5 SOLUTION RESPIRATORY (INHALATION)
Status: DISCONTINUED | OUTPATIENT
Start: 2025-02-10 | End: 2025-02-10 | Stop reason: HOSPADM

## 2025-02-10 RX ORDER — SODIUM CHLORIDE, SODIUM LACTATE, POTASSIUM CHLORIDE, CALCIUM CHLORIDE 600; 310; 30; 20 MG/100ML; MG/100ML; MG/100ML; MG/100ML
INJECTION, SOLUTION INTRAVENOUS CONTINUOUS
Status: DISCONTINUED | OUTPATIENT
Start: 2025-02-10 | End: 2025-02-10 | Stop reason: HOSPADM

## 2025-02-10 RX ORDER — MEPERIDINE HYDROCHLORIDE 25 MG/ML
12.5 INJECTION INTRAMUSCULAR; INTRAVENOUS; SUBCUTANEOUS
Status: DISCONTINUED | OUTPATIENT
Start: 2025-02-10 | End: 2025-02-10 | Stop reason: HOSPADM

## 2025-02-10 RX ORDER — DIPHENHYDRAMINE HCL 25 MG
25 TABLET ORAL NIGHTLY PRN
Status: DISCONTINUED | OUTPATIENT
Start: 2025-02-10 | End: 2025-02-11 | Stop reason: HOSPADM

## 2025-02-10 RX ORDER — HALOPERIDOL 5 MG/ML
1 INJECTION INTRAMUSCULAR
Status: DISCONTINUED | OUTPATIENT
Start: 2025-02-10 | End: 2025-02-10 | Stop reason: HOSPADM

## 2025-02-10 RX ORDER — AMLODIPINE BESYLATE 5 MG/1
2.5 TABLET ORAL
Status: DISCONTINUED | OUTPATIENT
Start: 2025-02-10 | End: 2025-02-11 | Stop reason: HOSPADM

## 2025-02-10 RX ORDER — SODIUM CHLORIDE, SODIUM LACTATE, POTASSIUM CHLORIDE, CALCIUM CHLORIDE 600; 310; 30; 20 MG/100ML; MG/100ML; MG/100ML; MG/100ML
INJECTION, SOLUTION INTRAVENOUS CONTINUOUS
Status: ACTIVE | OUTPATIENT
Start: 2025-02-10 | End: 2025-02-10

## 2025-02-10 RX ORDER — PROTAMINE SULFATE 10 MG/ML
INJECTION, SOLUTION INTRAVENOUS PRN
Status: DISCONTINUED | OUTPATIENT
Start: 2025-02-10 | End: 2025-02-10 | Stop reason: SURG

## 2025-02-10 RX ORDER — ATORVASTATIN CALCIUM 10 MG/1
10 TABLET, FILM COATED ORAL NIGHTLY
Status: DISCONTINUED | OUTPATIENT
Start: 2025-02-10 | End: 2025-02-11 | Stop reason: HOSPADM

## 2025-02-10 RX ORDER — AMOXICILLIN 250 MG
1 CAPSULE ORAL
Status: DISCONTINUED | OUTPATIENT
Start: 2025-02-10 | End: 2025-02-11 | Stop reason: HOSPADM

## 2025-02-10 RX ORDER — ACETAMINOPHEN 325 MG/1
650 TABLET ORAL EVERY 6 HOURS PRN
Status: DISCONTINUED | OUTPATIENT
Start: 2025-02-10 | End: 2025-02-11 | Stop reason: HOSPADM

## 2025-02-10 RX ORDER — LOSARTAN POTASSIUM 50 MG/1
100 TABLET ORAL
Status: DISCONTINUED | OUTPATIENT
Start: 2025-02-10 | End: 2025-02-11 | Stop reason: HOSPADM

## 2025-02-10 RX ORDER — AMOXICILLIN 250 MG
1 CAPSULE ORAL NIGHTLY
Status: DISCONTINUED | OUTPATIENT
Start: 2025-02-10 | End: 2025-02-11 | Stop reason: HOSPADM

## 2025-02-10 RX ORDER — LIDOCAINE HYDROCHLORIDE 20 MG/ML
INJECTION, SOLUTION EPIDURAL; INFILTRATION; INTRACAUDAL; PERINEURAL PRN
Status: DISCONTINUED | OUTPATIENT
Start: 2025-02-10 | End: 2025-02-10 | Stop reason: SURG

## 2025-02-10 RX ORDER — PREGABALIN 150 MG/1
150 CAPSULE ORAL EVERY 8 HOURS
Status: DISCONTINUED | OUTPATIENT
Start: 2025-02-10 | End: 2025-02-11 | Stop reason: HOSPADM

## 2025-02-10 RX ORDER — BUPIVACAINE HYDROCHLORIDE 2.5 MG/ML
INJECTION, SOLUTION EPIDURAL; INFILTRATION; INTRACAUDAL
Status: DISCONTINUED | OUTPATIENT
Start: 2025-02-10 | End: 2025-02-10 | Stop reason: HOSPADM

## 2025-02-10 RX ORDER — LIDOCAINE HYDROCHLORIDE 20 MG/ML
INJECTION, SOLUTION INFILTRATION; PERINEURAL
Status: DISCONTINUED | OUTPATIENT
Start: 2025-02-10 | End: 2025-02-10 | Stop reason: HOSPADM

## 2025-02-10 RX ORDER — LIDOCAINE HYDROCHLORIDE 40 MG/ML
SOLUTION TOPICAL PRN
Status: DISCONTINUED | OUTPATIENT
Start: 2025-02-10 | End: 2025-02-10 | Stop reason: SURG

## 2025-02-10 RX ORDER — DEXAMETHASONE SODIUM PHOSPHATE 4 MG/ML
INJECTION, SOLUTION INTRA-ARTICULAR; INTRALESIONAL; INTRAMUSCULAR; INTRAVENOUS; SOFT TISSUE PRN
Status: DISCONTINUED | OUTPATIENT
Start: 2025-02-10 | End: 2025-02-10 | Stop reason: SURG

## 2025-02-10 RX ORDER — ASPIRIN 81 MG/1
81 TABLET ORAL DAILY
Status: DISCONTINUED | OUTPATIENT
Start: 2025-02-10 | End: 2025-02-11 | Stop reason: HOSPADM

## 2025-02-10 RX ORDER — DIPHENHYDRAMINE HYDROCHLORIDE 50 MG/ML
25 INJECTION INTRAMUSCULAR; INTRAVENOUS EVERY 6 HOURS PRN
Status: DISCONTINUED | OUTPATIENT
Start: 2025-02-10 | End: 2025-02-11 | Stop reason: HOSPADM

## 2025-02-10 RX ORDER — BISACODYL 10 MG
10 SUPPOSITORY, RECTAL RECTAL
Status: DISCONTINUED | OUTPATIENT
Start: 2025-02-10 | End: 2025-02-11 | Stop reason: HOSPADM

## 2025-02-10 RX ORDER — POTASSIUM CHLORIDE 1500 MG/1
20 TABLET, EXTENDED RELEASE ORAL ONCE
Status: COMPLETED | OUTPATIENT
Start: 2025-02-10 | End: 2025-02-10

## 2025-02-10 RX ORDER — POLYETHYLENE GLYCOL 3350 17 G/17G
1 POWDER, FOR SOLUTION ORAL 2 TIMES DAILY PRN
Status: DISCONTINUED | OUTPATIENT
Start: 2025-02-10 | End: 2025-02-11 | Stop reason: HOSPADM

## 2025-02-10 RX ORDER — ONDANSETRON 2 MG/ML
INJECTION INTRAMUSCULAR; INTRAVENOUS PRN
Status: DISCONTINUED | OUTPATIENT
Start: 2025-02-10 | End: 2025-02-10 | Stop reason: SURG

## 2025-02-10 RX ORDER — OXYCODONE HCL 5 MG/5 ML
5 SOLUTION, ORAL ORAL
Status: DISCONTINUED | OUTPATIENT
Start: 2025-02-10 | End: 2025-02-10 | Stop reason: HOSPADM

## 2025-02-10 RX ORDER — ONDANSETRON 2 MG/ML
4 INJECTION INTRAMUSCULAR; INTRAVENOUS EVERY 4 HOURS PRN
Status: DISCONTINUED | OUTPATIENT
Start: 2025-02-10 | End: 2025-02-11 | Stop reason: HOSPADM

## 2025-02-10 RX ORDER — DOCUSATE SODIUM 100 MG/1
100 CAPSULE, LIQUID FILLED ORAL 2 TIMES DAILY
Status: DISCONTINUED | OUTPATIENT
Start: 2025-02-10 | End: 2025-02-11 | Stop reason: HOSPADM

## 2025-02-10 RX ORDER — SODIUM CHLORIDE 9 MG/ML
INJECTION, SOLUTION INTRAVENOUS CONTINUOUS
Status: ACTIVE | OUTPATIENT
Start: 2025-02-10 | End: 2025-02-10

## 2025-02-10 RX ORDER — FUROSEMIDE 10 MG/ML
20 INJECTION INTRAMUSCULAR; INTRAVENOUS ONCE
Status: COMPLETED | OUTPATIENT
Start: 2025-02-10 | End: 2025-02-10

## 2025-02-10 RX ORDER — DIPHENHYDRAMINE HYDROCHLORIDE 50 MG/ML
12.5 INJECTION INTRAMUSCULAR; INTRAVENOUS
Status: DISCONTINUED | OUTPATIENT
Start: 2025-02-10 | End: 2025-02-10 | Stop reason: HOSPADM

## 2025-02-10 RX ORDER — HYDRALAZINE HYDROCHLORIDE 20 MG/ML
5 INJECTION INTRAMUSCULAR; INTRAVENOUS
Status: DISCONTINUED | OUTPATIENT
Start: 2025-02-10 | End: 2025-02-10 | Stop reason: HOSPADM

## 2025-02-10 RX ORDER — CEFAZOLIN SODIUM 1 G/3ML
INJECTION, POWDER, FOR SOLUTION INTRAMUSCULAR; INTRAVENOUS PRN
Status: DISCONTINUED | OUTPATIENT
Start: 2025-02-10 | End: 2025-02-10 | Stop reason: SURG

## 2025-02-10 RX ORDER — HEPARIN SODIUM,PORCINE 1000/ML
VIAL (ML) INJECTION PRN
Status: DISCONTINUED | OUTPATIENT
Start: 2025-02-10 | End: 2025-02-10 | Stop reason: SURG

## 2025-02-10 RX ADMIN — LIDOCAINE HYDROCHLORIDE 4 ML: 40 SOLUTION TOPICAL at 07:39

## 2025-02-10 RX ADMIN — DOCUSATE SODIUM 100 MG: 100 CAPSULE, LIQUID FILLED ORAL at 16:26

## 2025-02-10 RX ADMIN — LIDOCAINE HYDROCHLORIDE 100 MG: 20 INJECTION, SOLUTION EPIDURAL; INFILTRATION; INTRACAUDAL; PERINEURAL at 07:38

## 2025-02-10 RX ADMIN — ONDANSETRON 8 MG: 2 INJECTION INTRAMUSCULAR; INTRAVENOUS at 08:22

## 2025-02-10 RX ADMIN — PREGABALIN 150 MG: 150 CAPSULE ORAL at 14:29

## 2025-02-10 RX ADMIN — DEXAMETHASONE SODIUM PHOSPHATE 8 MG: 4 INJECTION INTRA-ARTICULAR; INTRALESIONAL; INTRAMUSCULAR; INTRAVENOUS; SOFT TISSUE at 07:38

## 2025-02-10 RX ADMIN — SUGAMMADEX 200 MG: 100 INJECTION, SOLUTION INTRAVENOUS at 08:23

## 2025-02-10 RX ADMIN — ASPIRIN 81 MG: 81 TABLET, COATED ORAL at 12:49

## 2025-02-10 RX ADMIN — FENTANYL CITRATE 50 MCG: 50 INJECTION, SOLUTION INTRAMUSCULAR; INTRAVENOUS at 08:14

## 2025-02-10 RX ADMIN — POTASSIUM CHLORIDE 20 MEQ: 1500 TABLET, EXTENDED RELEASE ORAL at 12:49

## 2025-02-10 RX ADMIN — HYDRALAZINE HYDROCHLORIDE 5 MG: 20 INJECTION, SOLUTION INTRAMUSCULAR; INTRAVENOUS at 09:16

## 2025-02-10 RX ADMIN — PROTAMINE SULFATE 50 MG: 10 INJECTION, SOLUTION INTRAVENOUS at 08:16

## 2025-02-10 RX ADMIN — AMLODIPINE BESYLATE 2.5 MG: 5 TABLET ORAL at 12:47

## 2025-02-10 RX ADMIN — HEPARIN SODIUM 8000 UNITS: 1000 INJECTION, SOLUTION INTRAVENOUS; SUBCUTANEOUS at 07:58

## 2025-02-10 RX ADMIN — SODIUM CHLORIDE, POTASSIUM CHLORIDE, SODIUM LACTATE AND CALCIUM CHLORIDE: 600; 310; 30; 20 INJECTION, SOLUTION INTRAVENOUS at 07:32

## 2025-02-10 RX ADMIN — HYDRALAZINE HYDROCHLORIDE 5 MG: 20 INJECTION, SOLUTION INTRAMUSCULAR; INTRAVENOUS at 09:08

## 2025-02-10 RX ADMIN — SODIUM CHLORIDE: 9 INJECTION, SOLUTION INTRAVENOUS at 11:49

## 2025-02-10 RX ADMIN — DOCUSATE SODIUM 100 MG: 100 CAPSULE, LIQUID FILLED ORAL at 12:49

## 2025-02-10 RX ADMIN — FUROSEMIDE 20 MG: 10 INJECTION, SOLUTION INTRAVENOUS at 12:47

## 2025-02-10 RX ADMIN — ATORVASTATIN CALCIUM 10 MG: 10 TABLET, FILM COATED ORAL at 20:59

## 2025-02-10 RX ADMIN — LOSARTAN POTASSIUM 100 MG: 50 TABLET, FILM COATED ORAL at 20:59

## 2025-02-10 RX ADMIN — CEFAZOLIN 2 G: 1 INJECTION, POWDER, FOR SOLUTION INTRAMUSCULAR; INTRAVENOUS at 07:38

## 2025-02-10 RX ADMIN — ROCURONIUM BROMIDE 50 MG: 10 INJECTION, SOLUTION INTRAVENOUS at 07:38

## 2025-02-10 RX ADMIN — PROPOFOL 150 MG: 10 INJECTION, EMULSION INTRAVENOUS at 07:38

## 2025-02-10 RX ADMIN — PREGABALIN 150 MG: 150 CAPSULE ORAL at 20:59

## 2025-02-10 ASSESSMENT — PATIENT HEALTH QUESTIONNAIRE - PHQ9
SUM OF ALL RESPONSES TO PHQ9 QUESTIONS 1 AND 2: 0
1. LITTLE INTEREST OR PLEASURE IN DOING THINGS: NOT AT ALL
2. FEELING DOWN, DEPRESSED, IRRITABLE, OR HOPELESS: NOT AT ALL

## 2025-02-10 ASSESSMENT — FIBROSIS 4 INDEX
FIB4 SCORE: 2.83
FIB4 SCORE: 2.83

## 2025-02-10 ASSESSMENT — PAIN DESCRIPTION - PAIN TYPE: TYPE: ACUTE PAIN

## 2025-02-10 NOTE — ANESTHESIA PREPROCEDURE EVALUATION
Case: [id number] Anesthesia Start Date/Time: 02/10/25 0732    Procedures:       TRANSCATHETER AORTIC VALVE REPLACEMENT (Bilateral: Groin) - Site also includes right wrist, site clean, closure N/A    Guerrero Gabriel 3 Ultra Resilia 23mm  Ref [id number]  SN [id number]  Exp 10/02/2027      ECHOCARDIOGRAM, TRANSESOPHAGEAL, INTRAOPERATIVE (Throat)    Anesthesia type: general    Pre-op diagnosis: SEVERE AORTIC STENOSIS    Location: [place] [address] / SURGERY [facility]    Surgeons: Abad Nava M.D.            Relevant Problems   CARDIAC   (positive) Atherosclerotic vascular disease   (positive) Celiac artery stenosis (HCC)   (positive) Coronary artery disease involving native coronary artery of native heart without angina pectoris   (positive) Essential hypertension   (positive) Renal artery stenosis (HCC)   (positive) Severe aortic stenosis      Other   (positive) Dyslipidemia       Physical Exam    Airway   Mallampati: II  TM distance: >3 FB  Neck ROM: full       Cardiovascular - normal exam  Rhythm: regular  Rate: normal  (-) murmur     Dental   (+) upper dentures, lower dentures           Pulmonary - normal exam  Breath sounds clear to auscultation     Abdominal    Neurological - normal exam                   Anesthesia Plan    ASA 3 (Severe aortic stenosis)   ASA physical status 3 criteria: CAD (>3 months) and other (comment)    Plan - general       Airway plan will be ETT  DIIVNE Planned        Induction: intravenous    Postoperative Plan: Postoperative administration of opioids is intended.    Pertinent diagnostic labs and testing reviewed    Informed Consent:    Anesthetic plan and risks discussed with patient and spouse.    Use of blood products discussed with: patient and spouse whom.

## 2025-02-10 NOTE — CARE PLAN
The patient is Stable - Low risk of patient condition declining or worsening    Shift Goals  Clinical Goals: ambulation after bedrest, education, monitor groin/radial sites  Patient Goals: updates  Family Goals: tegan    Progress made toward(s) clinical / shift goals:      Problem: Knowledge Deficit - Standard  Goal: Patient and family/care givers will demonstrate understanding of plan of care, disease process/condition, diagnostic tests and medications  Outcome: Progressing     Problem: Fall Risk  Goal: Patient will remain free from falls  Outcome: Progressing     Problem: Hemodynamics  Goal: Patient's hemodynamics, fluid balance and neurologic status will be stable or improve  Outcome: Progressing     Problem: Respiratory  Goal: Patient will achieve/maintain optimum respiratory ventilation and gas exchange  Outcome: Progressing     Problem: Fluid Volume  Goal: Fluid volume balance will be maintained  Outcome: Progressing     Problem: Urinary Elimination  Goal: Establish and maintain regular urinary output  Outcome: Progressing     Problem: Mobility  Goal: Patient's capacity to carry out activities will improve  Outcome: Progressing

## 2025-02-10 NOTE — OR NURSING
0832: Pt arrived from OR, handoff received from anesthesiologist and RN. Patient asleep, breathing even and unlabored, on 6l oxygen via mask. Vitals stable. Bilateral groin sites soft, clean/dry, no bruising. Right TR band in place with 14mL of air. Site clean/dry, no bruising present.     0900: Patient awake, oriented x4, moving all extremities. EKG at bedside. Patient placed on 2L oxygen via nasal cannula.,     0950: Belongings retrieved form locker.     1010: Report give to T8 Di PILLAI.     1015: Patient's  updated on status and room assignment.     1025: Patient transferred to T8 in stable conditio. On monitor. Belongings with patient. Bilateral groin sites and right wrist remain clean/dry, soft.

## 2025-02-10 NOTE — ANESTHESIA TIME REPORT
Anesthesia Start and Stop Event Times       Date Time Event    2/10/2025 0708 Ready for Procedure     0732 Anesthesia Start     0835 Anesthesia Stop          Responsible Staff  02/10/25      Name Role Begin End    Fareed Ghosh M.D. Anesth 0732 0835          Overtime Reason:  no overtime (within assigned shift)    Comments:

## 2025-02-10 NOTE — ANESTHESIA PROCEDURE NOTES
DIVINE    Date/Time: 2/10/2025 7:50 AM    Performed by: Fareed Ghosh M.D.  Authorized by: Fareed Ghosh M.D.    Start Time:2/10/2025 7:50 AM  Preanesthetic Checklist: patient identified, IV checked, risks and benefits discussed, surgical consent, monitors and equipment checked, pre-op evaluation and timeout performed    Indication for DIVINE: diagnostic   Patient Location: OR  Intubated: Yes  Bite Block: Yes  Heart Visualized: Yes  Insertion: atraumatic    **See FULL DIVINE report in patient's chart via CV Synapse**

## 2025-02-10 NOTE — PROGRESS NOTES
4 Eyes Skin Assessment Completed by FREYA Sevilla and FREYA Cui.    Head WDL  Ears Redness and Blanching  Nose WDL  Mouth WDL  Neck WDL  Breast/Chest WDL  Shoulder Blades WDL  Spine WDL  (R) Arm/Elbow/Hand Redness and Blanching, radial site  (L) Arm/Elbow/Hand Redness and Blanching, radial site  Abdomen WDL  Groin WDL, bilateral groin sites  Scrotum/Coccyx/Buttocks Redness and Blanching  (R) Leg WDL  (L) Leg WDL  (R) Heel/Foot/Toe WDL  (L) Heel/Foot/Toe WDL          Devices In Places Tele Box, Blood Pressure Cuff, and Pulse Ox      Interventions In Place Pillows    Possible Skin Injury No    Pictures Uploaded Into Epic N/A  Wound Consult Placed N/A  RN Wound Prevention Protocol Ordered No

## 2025-02-10 NOTE — PROGRESS NOTES
Received report from PACU RN. Pt arrived to unit at 1030 via hospital bed escorted by PACU RN. Assessment complete. Post-op vital signs initiated. Patient to continue bedrest until 1230 this afternoon. Bilater groin and radial sites clean, dry, soft, and intact. No signs of distress noted at this time. Tele monitor in place. Monitor room notified. Pt c/o pain 0/10. Fall precautions and appropriate signs in place. Pt oriented to unit routine, call light/phone system within reach. Personal belongings within reach. Pt educated regarding fall precautions. Bed alarm is on. Pt denies any further needs at this time.

## 2025-02-10 NOTE — OP REPORT
Operative Report    PreOp Diagnosis: Severe aortic stenosis      PostOp Diagnosis: right      Procedure(s):  TRANSCATHETER AORTIC VALVE REPLACEMENT WITH A 23MM RESILIA S3- Wound Class: Clean  ECHOCARDIOGRAM, TRANSESOPHAGEAL, INTRAOPERATIVE - Wound Class: Clean Contaminated    Surgeon(s):  SHANTAL Tadeo D.O.    Anesthesiologist/Type of Anesthesia:  Anesthesiologist: Fareed Ghosh M.D.  Perfusionist: Juni Burgos/General    Surgical Staff:  Circulator: Ignacio Gross R.N.  Scrub Person: Sendy Campuzano  Radiology Technologist: Juni Javed  Cath Lab Tech: Cyndi Slade  Cardiology Nurse: Rocio Valdez R.N.    Specimens removed if any:  * No specimens in log *    Estimated Blood Loss: minimal    Findings: trivial PVL    Complications: none immediate    Disposition: stable to pacu    Procedure:    After informed consent was obtained, the patient was brought to the operating room and placed in the supine position on the operating table afterwards, general endotracheal anesthesia was induced by the anesthesia team.  Lines, catheters etc. were placed by the nursing and anesthesia team in the usual fashion.  Bony prominences were padded, joints placed in neutral position, and the patient was prepped and draped in the usual sterile fashion.  A timeout was performed.    We began the procedure by obtaining primary and secondary arterial access.  Primary arterial access was in the right common femoral artery, and secondary arterial access was in the right radial artery.  Access for temporary venous pacing wire was placed via the left common femoral vein.  The femoral vessels were accessed using a  Seldinger's technique with ultrasound guidance.  After obtaining access, 6 Czech sheaths were placed here.  The right radial artery was accessed by my cardiology colleague in the usual fashion.  right femoral angiogram was performed to confirm good positioning of our sheath.  Following  that, the temporary pacing lead was floated from the left common femoral vein to the right ventricular apex.  Testing confirmed excellent capture.  Following that, a J-wire was advanced via the right radial catheter with an overriding pigtail catheter into the aortic root.  Angiography was performed there and are coplanar view was determined.  Heparinization was performed    Via the right femoral artery, access obtained with a J-wire.  The 6 Dutch sheath was removed and 2 Perclose sutures were placed in the usual fashion.  Following those, an 8 Dutch sheath was placed.  We again obtained access with a J-wire and pigtail catheter.  The J-wire was exchanged for a Lunderquist wire.  This allowed us to upsize our primary arterial access site to a 14 Dutch sheath.  Once the sheath was in place, it was secured with an Ethibond stitch.  A J-wire with an overriding AL-1 catheter was advanced to the aortic root.  The J-wire was exchanged for a 0.035 straight wire which was used to cross the aortic valve.  The AL-1 catheter was then advanced into the left ventricle.  The straight wire was then exchanged for extra-stiff Amplatz deployment wire.  The AL-1 catheter was removed.      The 23 mm valve was prepped on the back table in the usual fashion and brought up for advancement into the aorta.  Prior to placement, the configuration of the valve was confirmed to be correct.  It was advanced into the abdominal aorta under fluoroscopic guidance.  The valve and balloon were docked in the usual fashion in the abdominal aorta.  It was then advanced across the aortic arch and down into the aortic root and across the aortic valve.  The valve was then prepped for deployment.  Following that, respirations were held, rapid ventricular pacing performed, and root angiogram shot.  The valve was then deployed us at a pressure of 5 evens, with 17 cc of contrast.We noted mild PVL The valve was re crossed and dilated with 18 cc of contrast at 7  evens.  The balloon was deflated at that point in the delivery system removed.  Post deployment echo revealed trivial PVL .    We proceeded with removal of all her wires, catheters etc.  The right femoral artery was secured using the 2 previously placed Perclose sutures.   Protamine was administered to reverse heparinization.  The right radial artery was treated with a TR band and pressure was held over the left common femoral venous site.  The patient tolerated procedure well, was taken to the CCU in stable condition.          2/10/2025 8:17 AM Daniel Taylor D.O.

## 2025-02-10 NOTE — ANESTHESIA PROCEDURE NOTES
Airway    Date/Time: 2/10/2025 7:39 AM    Performed by: Fareed Ghosh M.D.  Authorized by: Fareed Ghosh M.D.    Location:  OR  Urgency:  Elective  Difficult Airway: No    Indications for Airway Management:  Anesthesia      Spontaneous Ventilation: absent    Sedation Level:  Deep  Preoxygenated: Yes    Patient Position:  Sniffing  Final Airway Type:  Endotracheal airway  Final Endotracheal Airway:  ETT  Cuffed: Yes    Technique Used for Successful ETT Placement:  Direct laryngoscopy    Insertion Site:  Oral  Blade Type:  Damon  Laryngoscope Blade/Videolaryngoscope Blade Size:  3  ETT Size (mm):  7.5  Measured from:  Gums  ETT to Gums (cm):  21  Placement Verified by: auscultation and capnometry    Cormack-Lehane Classification:  Grade I - full view of glottis  Number of Attempts at Approach:  1

## 2025-02-10 NOTE — ANESTHESIA PROCEDURE NOTES
Arterial Line    Performed by: Fareed Ghosh M.D.  Authorized by: Fareed Ghosh M.D.    Start Time:  2/10/2025 7:40 AM  End Time:  2/10/2025 7:46 AM  Localization: ultrasound guidance  Image captured, interpreted and electronically stored.  Patient Location:  OR  Indication: continuous blood pressure monitoring and blood sampling needed        Catheter Size:  20 G  Seldinger Technique?: Yes    Laterality:  Left  Site:  Radial artery  Line Secured:  Antimicrobial disc, tape and transparent dressing  Events: patient tolerated procedure well with no complications and greater than 3 attempts

## 2025-02-10 NOTE — ANESTHESIA POSTPROCEDURE EVALUATION
Patient: Rosenda Cowan    Procedure Summary       Date: 02/10/25 Room / Location: [place] [id number] / SURGERY [facility]    Anesthesia Start: 0732 Anesthesia Stop: 0835    Procedures:       TRANSCATHETER AORTIC VALVE REPLACEMENT (Bilateral: Groin)      ECHOCARDIOGRAM, TRANSESOPHAGEAL, INTRAOPERATIVE (Throat) Diagnosis: (SEVERE AORTIC STENOSIS)    Surgeons: Abad Nava M.D. Responsible Provider: Fareed Ghosh M.D.    Anesthesia Type: general ASA Status: 3            Final Anesthesia Type: general  Last vitals  BP   Blood Pressure : 127/59    Temp   36.4 °C (97.5 °F)    Pulse   90   Resp   20    SpO2   95 %      Anesthesia Post Evaluation    Patient location during evaluation: PACU  Level of consciousness: awake and alert    Airway patency: patent  Anesthetic complications: no  Cardiovascular status: hemodynamically stable  Respiratory status: acceptable  Hydration status: euvolemic    PONV: none          There were no known notable events for this encounter.     Nurse Pain Score: 0 (NPRS)

## 2025-02-10 NOTE — OP REPORT
"TRANSCATHETER AORTIC VALVE REPLACEMENT REPORT    Referring Provider:  PCP    INTERVENTIONAL CARDIOLOGIST: Abad Nava MD  CARDIAC SURGEON: Dnaiel Taylor DO  ANESTHESIOLOGIST: Fareed Ghosh MD    ASSISTANT: None    IMPRESSIONS:  1. Successful TAVR with implantation of a 23 Guerrero Gabriel S3 Ultra Resilia deployed at nominal volume +1 cc via the transfemoral approach  2. Acute decompensated diastolic heart failure with LVEDP of 21 mmHg    Recommendations:  4 hour bedrest    Pre-procedure diagnosis: TAVR recommended by heart valve team. Stage D1 (symptomatic severe AS)  Post-procedure diagnosis: Same    Procedure performed  Pigtail placement/ascending aortography  Transvenous pacemaker  23 Guerrero S3 Ultra Resilia  Perclose closure    Procedure Description  1. Access:   A) Valve Sheath: Right femoral, 14F Guerrero eSheath. Fluoroscopic guidance was utilized for femoral access Dynamic ultrasound was utilized to gain access.  B) Temporary pacemaker: 6 Vietnamese Left femoral vein. Fluoroscopic guidance was utilized for femoral access Dynamic ultrasound was utilized to gain access.  C) Pigtail catheter: 5/6 Vietnamese right radial artery Micropuncture technique was utilized following local anesthesia with lidocaine.  A radial cocktail containing verapamil and saline was administered in the radial artery sheath    2. Procedure Description:  A TVP and pigtail catheter were placed and appropriate pacer function and implantation angle confirmed. A pigtail catheter was used to perform left heart catheterization and LVEDP measured at 21 mmHg.The preclose was deployed followed by dilation of the tract with an 8F sheath. A standard 0.035\" J wire was used to deliver an catheter to the ascending aorta and then exchange for a 0.035\" Lunderquist wire. Over this wire the Guerrero E sheath advanced into the descending aorta. An AL1 was placed in the ascending aorta and the valve crossed with a 0.035\" strait wire. The catheter was " "advanced into the LV apex and wire exchanged for a 0.035\" Amplatz Super Stiff wire.   Next a 23 mm Guerrero Gabriel S3 Ultra Resilia was deployed under rapid pacing with nominal volume  and 5 evens. Echocardiogram showed unacceptable paravalvular leak and thus the valve was re-crossed with the deployment balloon and an additional 1 cc were added followed by repeat valvuloplasty during rapid pacing. . Successful valve implantation confirmed by [facility].   Protamine was administered and the Guerrero sheath was removed from the body after reinsertion of the dilator. The perclose sutures were tightened hemostasis was achieved. The pigtail catheter was removed. The TVP was removed.    3. Hemostasis:   A) TAVR Sheath: Perclose by Preclose technique  B) TVP: Manual  C) Pigtail access: TR band    Technical Factors  1. Complications: None  2. Estimated Blood Loss: 50 cc  3. Specimens: None  4. Contrast Volume: 40 ml  5. Sedation: General Anesthesia  6. Echo: [facility]    "

## 2025-02-11 ENCOUNTER — APPOINTMENT (OUTPATIENT)
Dept: RADIOLOGY | Facility: MEDICAL CENTER | Age: 80
DRG: 266 | End: 2025-02-11
Attending: NURSE PRACTITIONER
Payer: MEDICARE

## 2025-02-11 VITALS
OXYGEN SATURATION: 90 % | BODY MASS INDEX: 26.66 KG/M2 | SYSTOLIC BLOOD PRESSURE: 121 MMHG | DIASTOLIC BLOOD PRESSURE: 54 MMHG | WEIGHT: 175.93 LBS | HEART RATE: 77 BPM | RESPIRATION RATE: 18 BRPM | TEMPERATURE: 97.2 F | HEIGHT: 68 IN

## 2025-02-11 LAB
ALBUMIN SERPL BCP-MCNC: 3.5 G/DL (ref 3.2–4.9)
ALBUMIN/GLOB SERPL: 1 G/DL
ALP SERPL-CCNC: 85 U/L (ref 30–99)
ALT SERPL-CCNC: 20 U/L (ref 2–50)
ANION GAP SERPL CALC-SCNC: 9 MMOL/L (ref 7–16)
AST SERPL-CCNC: 46 U/L (ref 12–45)
BILIRUB SERPL-MCNC: 0.5 MG/DL (ref 0.1–1.5)
BUN SERPL-MCNC: 21 MG/DL (ref 8–22)
CALCIUM ALBUM COR SERPL-MCNC: 9.4 MG/DL (ref 8.5–10.5)
CALCIUM SERPL-MCNC: 9 MG/DL (ref 8.5–10.5)
CHLORIDE SERPL-SCNC: 104 MMOL/L (ref 96–112)
CHOLEST SERPL-MCNC: 130 MG/DL (ref 100–199)
CO2 SERPL-SCNC: 25 MMOL/L (ref 20–33)
CREAT SERPL-MCNC: 0.93 MG/DL (ref 0.5–1.4)
EKG IMPRESSION: NORMAL
ERYTHROCYTE [DISTWIDTH] IN BLOOD BY AUTOMATED COUNT: 44.7 FL (ref 35.9–50)
GFR SERPLBLD CREATININE-BSD FMLA CKD-EPI: 63 ML/MIN/1.73 M 2
GLOBULIN SER CALC-MCNC: 3.4 G/DL (ref 1.9–3.5)
GLUCOSE SERPL-MCNC: 116 MG/DL (ref 65–99)
HCT VFR BLD AUTO: 34.6 % (ref 37–47)
HDLC SERPL-MCNC: 53 MG/DL
HGB BLD-MCNC: 11.9 G/DL (ref 12–16)
LDLC SERPL CALC-MCNC: 66 MG/DL
MCH RBC QN AUTO: 31.3 PG (ref 27–33)
MCHC RBC AUTO-ENTMCNC: 34.4 G/DL (ref 32.2–35.5)
MCV RBC AUTO: 91.1 FL (ref 81.4–97.8)
NT-PROBNP SERPL IA-MCNC: 907 PG/ML (ref 0–125)
PLATELET # BLD AUTO: 190 K/UL (ref 164–446)
PMV BLD AUTO: 10.1 FL (ref 9–12.9)
POTASSIUM SERPL-SCNC: 4.5 MMOL/L (ref 3.6–5.5)
PROT SERPL-MCNC: 6.9 G/DL (ref 6–8.2)
RBC # BLD AUTO: 3.8 M/UL (ref 4.2–5.4)
SODIUM SERPL-SCNC: 138 MMOL/L (ref 135–145)
TRIGL SERPL-MCNC: 55 MG/DL (ref 0–149)
WBC # BLD AUTO: 5.7 K/UL (ref 4.8–10.8)

## 2025-02-11 PROCEDURE — 71045 X-RAY EXAM CHEST 1 VIEW: CPT

## 2025-02-11 PROCEDURE — 700111 HCHG RX REV CODE 636 W/ 250 OVERRIDE (IP): Mod: JZ | Performed by: NURSE PRACTITIONER

## 2025-02-11 PROCEDURE — 36415 COLL VENOUS BLD VENIPUNCTURE: CPT

## 2025-02-11 PROCEDURE — 93005 ELECTROCARDIOGRAM TRACING: CPT | Mod: TC | Performed by: NURSE PRACTITIONER

## 2025-02-11 PROCEDURE — 85027 COMPLETE CBC AUTOMATED: CPT

## 2025-02-11 PROCEDURE — 83880 ASSAY OF NATRIURETIC PEPTIDE: CPT

## 2025-02-11 PROCEDURE — A9270 NON-COVERED ITEM OR SERVICE: HCPCS | Performed by: NURSE PRACTITIONER

## 2025-02-11 PROCEDURE — 700102 HCHG RX REV CODE 250 W/ 637 OVERRIDE(OP): Performed by: NURSE PRACTITIONER

## 2025-02-11 PROCEDURE — 93010 ELECTROCARDIOGRAM REPORT: CPT | Mod: Q0 | Performed by: INTERNAL MEDICINE

## 2025-02-11 PROCEDURE — 97535 SELF CARE MNGMENT TRAINING: CPT

## 2025-02-11 PROCEDURE — 99238 HOSP IP/OBS DSCHRG MGMT 30/<: CPT | Mod: FS,Q0 | Performed by: INTERNAL MEDICINE

## 2025-02-11 PROCEDURE — 80053 COMPREHEN METABOLIC PANEL: CPT

## 2025-02-11 PROCEDURE — 80061 LIPID PANEL: CPT

## 2025-02-11 RX ORDER — FUROSEMIDE 10 MG/ML
20 INJECTION INTRAMUSCULAR; INTRAVENOUS ONCE
Status: COMPLETED | OUTPATIENT
Start: 2025-02-11 | End: 2025-02-11

## 2025-02-11 RX ORDER — AMLODIPINE BESYLATE 2.5 MG/1
2.5 TABLET ORAL DAILY
Qty: 100 TABLET | Refills: 3 | Status: SHIPPED | OUTPATIENT
Start: 2025-02-12 | End: 2026-03-19

## 2025-02-11 RX ORDER — ASPIRIN 81 MG/1
81 TABLET ORAL DAILY
Qty: 100 TABLET | Refills: 3 | Status: SHIPPED | OUTPATIENT
Start: 2025-02-12

## 2025-02-11 RX ORDER — POTASSIUM CHLORIDE 1500 MG/1
20 TABLET, EXTENDED RELEASE ORAL ONCE
Status: COMPLETED | OUTPATIENT
Start: 2025-02-11 | End: 2025-02-11

## 2025-02-11 RX ADMIN — FUROSEMIDE 20 MG: 10 INJECTION, SOLUTION INTRAVENOUS at 10:15

## 2025-02-11 RX ADMIN — AMLODIPINE BESYLATE 2.5 MG: 5 TABLET ORAL at 04:12

## 2025-02-11 RX ADMIN — POTASSIUM CHLORIDE 20 MEQ: 1500 TABLET, EXTENDED RELEASE ORAL at 10:14

## 2025-02-11 RX ADMIN — ASPIRIN 81 MG: 81 TABLET, COATED ORAL at 04:12

## 2025-02-11 RX ADMIN — DOCUSATE SODIUM 100 MG: 100 CAPSULE, LIQUID FILLED ORAL at 04:12

## 2025-02-11 RX ADMIN — PREGABALIN 150 MG: 150 CAPSULE ORAL at 04:12

## 2025-02-11 ASSESSMENT — PATIENT HEALTH QUESTIONNAIRE - PHQ9
2. FEELING DOWN, DEPRESSED, IRRITABLE, OR HOPELESS: NOT AT ALL
1. LITTLE INTEREST OR PLEASURE IN DOING THINGS: NOT AT ALL
SUM OF ALL RESPONSES TO PHQ9 QUESTIONS 1 AND 2: 0

## 2025-02-11 ASSESSMENT — FIBROSIS 4 INDEX
FIB4 SCORE: 4.17
FIB4 SCORE: 4.17

## 2025-02-11 ASSESSMENT — PAIN DESCRIPTION - PAIN TYPE: TYPE: SURGICAL PAIN;ACUTE PAIN

## 2025-02-11 NOTE — PROGRESS NOTES
Patient is A&Ox4. Discharge instructions. Personal belongings in possession with patient. PIV and tele monitor removed. Copy of discharge instructions in patient chart, signed and reviewed. Patient verbalizes the understanding of the discharge instructions. Questions and concerns addressed prior to leaving the unit. Transported via wheelchair by RN. Patient discharged to home.

## 2025-02-11 NOTE — CARE PLAN
Problem: Knowledge Deficit - Standard  Goal: Patient and family/care givers will demonstrate understanding of plan of care, disease process/condition, diagnostic tests and medications  Description: Target End Date:  1-3 days or as soon as patient condition allows    Document in Patient Education    1.  Patient and family/caregiver oriented to unit, equipment, visitation policy and means for communicating concern  2.  Complete/review Learning Assessment  3.  Assess knowledge level of disease process/condition, treatment plan, diagnostic tests and medications  4.  Explain disease process/condition, treatment plan, diagnostic tests and medications  Outcome: Progressing     Problem: Psychosocial  Goal: Patient's level of anxiety will decrease  Description: Target End Date:  1-3 days or as soon as patient condition allows    1.  Collaborate with patient and family/caregiver to identify triggers and develop strategies to cope with anxiety  2.  Implement stimuli reduction, calming techniques  3.  Pharmacologic management per provider order  4.  Encourage patient/family/care giver participation  5.  Collaborate with interdisciplinary team including Psychologist or Behavioral Health Team as needed  Outcome: Progressing     Problem: Hemodynamics  Goal: Patient's hemodynamics, fluid balance and neurologic status will be stable or improve  Description: Target End Date:  Prior to discharge or change in level of care    Document on Assessment and I/O flowsheet templates    1.  Monitor vital signs, pulse oximetry and cardiac monitor per provider order and/or policy  2.  Maintain blood pressure per provider order  3.  Hemodynamic monitoring per provider order  4.  Manage IV fluids and IV infusions  5.  Monitor intake and output  6.  Daily weights per unit policy or provider order  7.  Assess peripheral pulses and capillary refill  8.  Assess color and body temperature  9.  Position patient for maximum circulation/cardiac  output  10. Monitor for signs/symptoms of excessive bleeding  11. Assess mental status, restlessness and changes in level of consciousness  12. Monitor temperature and report fever or hypothermia to provider immediately. Consideration of targeted temperature management.  Outcome: Progressing     Problem: Respiratory  Goal: Patient will achieve/maintain optimum respiratory ventilation and gas exchange  Description: Target End Date:  Prior to discharge or change in level of care    Document on Assessment flowsheet    1.  Assess and monitor rate, rhythm, depth and effort of respiration  2.  Breath sounds assessed qshift and/or as needed  3.  Assess O2 saturation, administer/titrate oxygen as ordered  4.  Position patient for maximum ventilatory efficiency  5.  Turn, cough, and deep breath with splinting to improve effectiveness  6.  Collaborate with RT to administer medication/treatments per order  7.  Encourage use of incentive spirometer and encourage patient to cough after use and utilize splinting techniques if applicable  8.  Airway suctioning  9.  Monitor sputum production for changes in color, consistency and frequency  10. Perform frequent oral hygiene  11. Alternate physical activity with rest periods  Outcome: Progressing   The patient is Stable - Low risk of patient condition declining or worsening    Shift Goals  Clinical Goals: VSS, safety  Patient Goals: to go home  Family Goals: OZIEL    Progress made toward(s) clinical / shift goals:  VSS, safety    Patient is not progressing towards the following goals:

## 2025-02-11 NOTE — PROGRESS NOTES
Monitor summary: Day shift  Rhythm: Sr/ST with 1st degree HB  Rate:   Measurements: .24/.07.31  Ectopy: Rare PVC couplets

## 2025-02-11 NOTE — DISCHARGE SUMMARY
PRIMARY DISCHARGE DIAGNOSIS: Status post transcatheter aortic valve replacement.    DISCHARGE DIAGNOSIS:  S/P TAVR    PROCEDURES/TESTIN. Successful transcatheter aortic valve replacement (TAVR) with #23 + 1ml Guerrero Gabriel 3 ultra Resilia valve, transfemoral approach under general anesthesia on 2/10/25  2. Intraoperative transesophageal echocardiogram showing results pending  3. CXR on 25 showing   1.  Hazy left lower lobe infiltrate  2.  Atherosclerosis  4. EKG on 25 showing SR rate of 68 bpm with first degree AV block  5. Labs   Lab Results   Component Value Date/Time    SODIUM 138 2025 02:25 AM    POTASSIUM 4.5 2025 02:25 AM    CHLORIDE 104 2025 02:25 AM    CO2 25 2025 02:25 AM    GLUCOSE 116 (H) 2025 02:25 AM    BUN 21 2025 02:25 AM    CREATININE 0.93 2025 02:25 AM       Lab Results   Component Value Date/Time    PROTHROMBTM 13.2 2025 06:43 AM    INR 1.00 2025 06:43 AM       Lab Results   Component Value Date/Time    WBC 5.7 2025 02:25 AM    RBC 3.80 (L) 2025 02:25 AM    HEMOGLOBIN 11.9 (L) 2025 02:25 AM    HEMATOCRIT 34.6 (L) 2025 02:25 AM    MCV 91.1 2025 02:25 AM    MCH 31.3 2025 02:25 AM    MCHC 34.4 2025 02:25 AM    MPV 10.1 2025 02:25 AM    NEUTSPOLYS 39.50 (L) 2021 11:05 AM    LYMPHOCYTES 45.30 (H) 2021 11:05 AM    MONOCYTES 9.70 2021 11:05 AM    EOSINOPHILS 4.40 2021 11:05 AM    BASOPHILS 0.80 2021 11:05 AM       HOSPITAL COURSE: The patient is a pleasant 79 year old female with severe symptomatic aortic stenosis, acute on chronic diastolic heart failure with intra-operatively elevated LVEDP of 21 mmHg, HLD with non-obstructive CAD, HTN with renal artery stenosis, celiac artery stenosis, and atherosclerotic valvular disease. Due to the patient's symptoms, the patient underwent successful TAVR described as above. Post-procedure, the patient did well. They  did require IV diuresis during their stay but will continue on oral diuretics post-operatively. Acute heart failure exacerbation as evidenced by: signs and symptoms of MARIE, hypoxemia with ambulation, weakness/fatigue; Echocardiogram showing mild ventricular hypertrophy, severe valvular stenosis, and dilated left atrium; corroborated by elevated BNP of 907, and intra-operatively elevated LVEDP of 21 mmHg. They were able to ambulate without difficulty. She did have mild hypoxemia down to 88% with ambulation. Another dose of IV lasix was given for further diuresis. As well as she was more hypertensive post-operatively than pre-operatively, her home BP medications were adjusted. No further events were noted during their stay. They are now off oxygen and are to be discharged to home with her family.    DISCHARGE MEDICATIONS:      Medication List        START taking these medications        Instructions   amLODIPine 2.5 MG Tabs  Start taking on: February 12, 2025  Commonly known as: Norvasc   Take 1 Tablet by mouth every day.  Dose: 2.5 mg     aspirin 81 MG EC tablet  Start taking on: February 12, 2025   Take 1 Tablet by mouth every day.  Dose: 81 mg            CHANGE how you take these medications        Instructions   estradiol 0.1 MG/GM vaginal cream  Commonly known as: Estrace   Insert 0.5 g into the vagina Every Sunday and Wednesday.  Dose: 0.5 g            CONTINUE taking these medications        Instructions   atorvastatin 10 MG Tabs  Commonly known as: Lipitor   Take 1 Tablet by mouth every evening.  Dose: 10 mg     CALCIUM 600+D3 PO   Take 1 Tablet by mouth every day.  Dose: 1 Tablet     losartan 100 MG Tabs  Commonly known as: Cozaar   Take 1 Tablet by mouth at bedtime.  Dose: 100 mg     Multiple Vitamins/Womens Tabs   Take 1 Tablet by mouth every day.  Dose: 1 Tablet     pregabalin 150 MG Caps  Commonly known as: Lyrica   Take 1 Capsule by mouth every 8 hours for 180 days. Indications: Neuropathic Pain  Dose:  150 mg            DISCHARGE INSTRUCTIONS: They are given discharge instructions on potential post-operative complications and symptoms to watch out for. Their groin sites were checked and were clean, dry, and intact. Patient or family to notify us for any complications noted on the discharge instructions. They will follow up with myself, Delores Perez DNP, APRN, on Tuesday in our cardiology office. They will not get labs before their follow up appointment. They will then follow up with a repeat echocardiogram in one month for post TAVR assessment.      Future Appointments   Date Time Provider Department Center   2/18/2025  1:45 PM ISABEL Salazar None   3/18/2025  1:15 PM [facility] EXAM 10 ECHO [facility]   3/27/2025  9:15 AM ISABEL Salazar None   2/10/2026 10:15 AM V EXAM 9 [facility]     Discharge time spent with patient was 31 minutes.

## 2025-02-12 NOTE — THERAPY
"Physical Therapy Contact Note    Patient Name: Rosenda Cowan  Age:  79 y.o., Sex:  female  Medical Record #: [id number]  Today's Date: 2/11/2025    S/P TAVR. PMHx of HTN, DLD, CAD, LVEDP, uterine prolapse. Observed pt amb with nursing in halls with no assistance. Pt able to hold conversation during activity. No mobility assessment needed. Pt given \"Physical Activity After TAVR\" handout. Education included:  Home Walking program with exercise to begin with 5 minutes.   Progression of home walking program  Progression of aerobic tolerance and how to self monitor including the Talk Test and RPE scale  Outpatient Cardiac Rehab Phase 2  Signs and symptoms when to call 911  S/S of CVA.  Pt receptive to education.     No further PT needs. Recommend cardiac rehab phase II.        02/11/25 1105   Time In/Time Out   Therapy Start Time 1054   Therapy End Time 1105   Total Therapy Time 11   Initial Contact Note    Initial Contact Note Order Received and Verified. Physical Therapy Evaluation NOT Completed Because Patient Does Not Require Acute Physical Therapy at this Time.   Precautions   Precautions Cardiac Precautions (See Comments)   Comments S/P TAVR   Cognition    Cognition / Consciousness WDL   Level of Consciousness Alert   Education Group   Education Provided Cardiac Precautions;Role of Physical Therapist   Cardiac Precautions Patient Response Patient;Acceptance;Explanation;Handout;Verbal Demonstration   Role of Physical Therapist Patient Response Patient;Acceptance;Explanation;Verbal Demonstration   Anticipated Discharge Equipment and Recommendations   Discharge Recommendations   (Cardiac Rehab Phase II)   Interdisciplinary Plan of Care Collaboration   IDT Collaboration with  Nursing   Patient Position at End of Therapy In Bed;Call Light within Reach;Tray Table within Reach;Phone within Reach   Collaboration Comments RNupdated       "

## 2025-02-18 ENCOUNTER — OFFICE VISIT (OUTPATIENT)
Dept: CARDIOLOGY | Facility: MEDICAL CENTER | Age: 80
End: 2025-02-18
Attending: NURSE PRACTITIONER
Payer: MEDICARE

## 2025-02-18 VITALS
WEIGHT: 175 LBS | OXYGEN SATURATION: 95 % | SYSTOLIC BLOOD PRESSURE: 124 MMHG | HEIGHT: 68 IN | DIASTOLIC BLOOD PRESSURE: 64 MMHG | BODY MASS INDEX: 26.52 KG/M2 | HEART RATE: 69 BPM | RESPIRATION RATE: 18 BRPM

## 2025-02-18 DIAGNOSIS — I70.1 RENAL ARTERY STENOSIS (HCC): ICD-10-CM

## 2025-02-18 DIAGNOSIS — E78.5 DYSLIPIDEMIA: ICD-10-CM

## 2025-02-18 DIAGNOSIS — Z95.2 S/P TAVR (TRANSCATHETER AORTIC VALVE REPLACEMENT): ICD-10-CM

## 2025-02-18 DIAGNOSIS — I77.4 CELIAC ARTERY STENOSIS (HCC): ICD-10-CM

## 2025-02-18 DIAGNOSIS — I10 ESSENTIAL HYPERTENSION: ICD-10-CM

## 2025-02-18 DIAGNOSIS — I25.10 CORONARY ARTERY DISEASE INVOLVING NATIVE CORONARY ARTERY OF NATIVE HEART WITHOUT ANGINA PECTORIS: ICD-10-CM

## 2025-02-18 DIAGNOSIS — I70.90 ATHEROSCLEROTIC VASCULAR DISEASE: ICD-10-CM

## 2025-02-18 PROBLEM — R94.30 ELEVATED LEFT VENTRICULAR END-DIASTOLIC PRESSURE (LVEDP): Status: RESOLVED | Noted: 2025-02-10 | Resolved: 2025-02-18

## 2025-02-18 LAB — EKG IMPRESSION: NORMAL

## 2025-02-18 PROCEDURE — 93005 ELECTROCARDIOGRAM TRACING: CPT | Mod: TC | Performed by: NURSE PRACTITIONER

## 2025-02-18 PROCEDURE — 99214 OFFICE O/P EST MOD 30 MIN: CPT | Performed by: NURSE PRACTITIONER

## 2025-02-18 PROCEDURE — 3078F DIAST BP <80 MM HG: CPT | Performed by: NURSE PRACTITIONER

## 2025-02-18 PROCEDURE — 3074F SYST BP LT 130 MM HG: CPT | Performed by: NURSE PRACTITIONER

## 2025-02-18 PROCEDURE — 93010 ELECTROCARDIOGRAM REPORT: CPT | Performed by: INTERNAL MEDICINE

## 2025-02-18 ASSESSMENT — ENCOUNTER SYMPTOMS
ORTHOPNEA: 0
ABDOMINAL PAIN: 0
PALPITATIONS: 0
FEVER: 0
CLAUDICATION: 0
COUGH: 0
SHORTNESS OF BREATH: 0
MYALGIAS: 0
PND: 0
DIZZINESS: 0

## 2025-02-18 ASSESSMENT — FIBROSIS 4 INDEX: FIB4 SCORE: 4.17

## 2025-02-18 NOTE — PATIENT INSTRUCTIONS
Obtain one month echocardiogram and follow up.    Continue all heart medications as prescribed.    Call for any concerns regarding leg swelling or shortness of breath.

## 2025-02-18 NOTE — PROGRESS NOTES
Chief Complaint   Patient presents with    Aortic Stenosis     Subjective     Rosenda Cowan is a 79 y.o. female who presents today for 1 week S/P TAVR.    She is a patient of Dr. Nava. Hx of severe symptomatic aortic stenosis now S/P TAVR, HLD with non-obstructive CAD, HTN with renal artery stenosis, celiac artery stenosis, and atherosclerotic valvular disease .    She reports no concerns. Feels good. Taking all medications.     He has no chest pain, shortness of breath, edema, dizziness/lightheadedness, or palpitations.    Past Medical History:   Diagnosis Date    Breath shortness     Dental disorder     upper denture    Gynecological disorder     Heart murmur     Heart valve disease     High cholesterol     Hypertension     Pain      Past Surgical History:   Procedure Laterality Date    TRANSCATHETER AORTIC VALVE REPLACEMENT Bilateral 2/10/2025    Procedure: TRANSCATHETER AORTIC VALVE REPLACEMENT;  Surgeon: Abad Nava M.D.;  Location: SURGERY [facility];  Service: Cardiac    ECHOCARDIOGRAM, TRANSESOPHAGEAL, INTRAOPERATIVE N/A 2/10/2025    Procedure: ECHOCARDIOGRAM, TRANSESOPHAGEAL, INTRAOPERATIVE;  Surgeon: Abad Nava M.D.;  Location: SURGERY [facility];  Service: Cardiac    [place] HYSTEROSCOPY,DX,SEP PROC N/A 10/16/2019    Procedure: HYSTEROSCOPY, DIAGNOSTIC;  Surgeon: Erin Gann M.D.;  Location: SURGERY SAME DAY [facility];  Service: Obstetrics    DILATION AND CURETTAGE N/A 10/16/2019    Procedure: DILATION AND CURETTAGE;  Surgeon: Erin Gann M.D.;  Location: SURGERY SAME DAY [facility];  Service: Obstetrics    APPENDECTOMY      CHOLECYSTECTOMY      NO PERTINENT PAST SURGICAL HISTORY      Gall bladder, appendix, tubal ligation tonsillectomy    OTHER      TUBAL COAGULATION LAPAROSCOPIC BILATERAL       Family History   Problem Relation Age of Onset    Hypertension Mother     Heart Disease Father     Hypertension Father     Colon Cancer Father     Other Sister          Intellectual Disability     No Known Problems Daughter     No Known Problems Daughter      Social History     Socioeconomic History    Marital status:      Spouse name: Not on file    Number of children: Not on file    Years of education: Not on file    Highest education level: 12th grade   Occupational History    Not on file   Tobacco Use    Smoking status: Never    Smokeless tobacco: Never   Vaping Use    Vaping status: Never Used   Substance and Sexual Activity    Alcohol use: Yes     Alcohol/week: 2.4 oz     Types: 4 Standard drinks or equivalent per week     Comment: Hiwot and coffee, occasional    Drug use: No    Sexual activity: Yes     Partners: Male     Birth control/protection: Post-Menopausal     Comment: .    Other Topics Concern    Not on file   Social History Narrative    Not on file     Social Drivers of Health     Financial Resource Strain: Low Risk  (11/14/2024)    Overall Financial Resource Strain (CARDIA)     Difficulty of Paying Living Expenses: Not hard at all   Food Insecurity: No Food Insecurity (11/14/2024)    Hunger Vital Sign     Worried About Running Out of Food in the Last Year: Never true     Ran Out of Food in the Last Year: Never true   Transportation Needs: No Transportation Needs (11/14/2024)    PRAPARE - Transportation     Lack of Transportation (Medical): No     Lack of Transportation (Non-Medical): No   Physical Activity: Insufficiently Active (11/14/2024)    Exercise Vital Sign     Days of Exercise per Week: 3 days     Minutes of Exercise per Session: 10 min   Stress: Stress Concern Present (11/14/2024)    [employer] [place] [employer] - Occupational Stress Questionnaire     Feeling of Stress : To some extent   Social Connections: Moderately Isolated (11/14/2024)    Social Connection and Isolation Panel [NHANES]     Frequency of Communication with Friends and Family: More than three times a week     Frequency of Social Gatherings with Friends and Family:  "Twice a week     Attends Temple Services: Never     Active Member of Clubs or Organizations: No     Attends Club or Organization Meetings: Never     Marital Status:    Intimate Partner Violence: Not on file   Housing Stability: Low Risk  (11/14/2024)    Housing Stability Vital Sign     Unable to Pay for Housing in the Last Year: No     Number of Times Moved in the Last Year: 0     Homeless in the Last Year: No     No Known Allergies  Outpatient Encounter Medications as of 2/18/2025   Medication Sig Dispense Refill    aspirin 81 MG EC tablet Take 1 Tablet by mouth every day. 100 Tablet 3    amLODIPine (NORVASC) 2.5 MG Tab Take 1 Tablet by mouth every day. 100 Tablet 3    atorvastatin (LIPITOR) 10 MG Tab Take 1 Tablet by mouth every evening. 90 Tablet 3    losartan (COZAAR) 100 MG Tab Take 1 Tablet by mouth at bedtime. 90 Tablet 3    pregabalin (LYRICA) 150 MG Cap Take 1 Capsule by mouth every 8 hours for 180 days. Indications: Neuropathic Pain 270 Capsule 1    estradiol (ESTRACE) 0.1 MG/GM vaginal cream Insert 0.5 g into the vagina Every Sunday and Wednesday. 42.5 g 5    Calcium Carb-Cholecalciferol (CALCIUM 600+D3 PO) Take 1 Tablet by mouth every day.      Multiple Vitamins-Minerals (MULTIPLE VITAMINS/WOMENS) Tab Take 1 Tablet by mouth every day.       No facility-administered encounter medications on file as of 2/18/2025.     Review of Systems   Constitutional:  Negative for fever and malaise/fatigue.   Respiratory:  Negative for cough and shortness of breath.    Cardiovascular:  Negative for chest pain, palpitations, orthopnea, claudication, leg swelling and PND.   Gastrointestinal:  Negative for abdominal pain.   Musculoskeletal:  Negative for myalgias.   Neurological:  Negative for dizziness.              Objective     /64 (BP Location: Left arm, Patient Position: Sitting, BP Cuff Size: Adult)   Pulse 69   Resp 18   Ht 1.715 m (5' 7.5\")   Wt 79.4 kg (175 lb)   LMP  (LMP Unknown)   SpO2 95%  "  BMI 27.00 kg/m²     Physical Exam  Vitals and nursing note reviewed.   Constitutional:       Appearance: Normal appearance. She is well-developed. She is obese.   HENT:      Head: Normocephalic and atraumatic.   Neck:      Vascular: No JVD.   Cardiovascular:      Rate and Rhythm: Normal rate and regular rhythm.      Pulses: Normal pulses.      Heart sounds: Normal heart sounds.   Pulmonary:      Effort: Pulmonary effort is normal.      Breath sounds: Normal breath sounds.   Musculoskeletal:         General: Normal range of motion.   Skin:     General: Skin is warm and dry.      Capillary Refill: Capillary refill takes less than 2 seconds.   Neurological:      General: No focal deficit present.      Mental Status: She is alert and oriented to person, place, and time. Mental status is at baseline.   Psychiatric:         Mood and Affect: Mood normal.         Behavior: Behavior normal.         Thought Content: Thought content normal.         Judgment: Judgment normal.                Assessment & Plan     1. S/P TAVR (transcatheter aortic valve replacement)  EKG      2. Atherosclerotic vascular disease        3. Celiac artery stenosis (HCC)        4. Coronary artery disease involving native coronary artery of native heart without angina pectoris        5. Dyslipidemia        6. Elevated left ventricular end-diastolic pressure (LVEDP)        7. Essential hypertension        8. Renal artery stenosis (HCC)          Medical Decision Making: Today's Assessment/Status/Plan:      1. S/P TAVR, NYHA I  -doing well post-op  -cont asa lifelong  -groins CDI with mild bruising and small nodule   -SBE prophylaxis understands lifelong  -echo 1 month with structural heart follow up  -reviewed hospital imaging, labs, and EKG  -cardiac rehab referral placed  -EKG shows SR with first degree block    2. HTN  -good control  -celiac artery and renal artery stenosis noted  -no concerns on amlodipine and losartan  -BP goal <130/80    3. HLD  with CAD, non-obstructive  -no angina or MARIE  -cont asa, statin  -LDL goal <70 with CAD    Patient is to follow up with Delores MEJIA in 1 month with echo.

## 2025-02-19 LAB — LV EJECT FRACT  99904: 65

## 2025-02-21 ENCOUNTER — DOCUMENTATION (OUTPATIENT)
Dept: CARDIOLOGY | Facility: MEDICAL CENTER | Age: 80
End: 2025-02-21
Payer: MEDICARE

## 2025-02-21 NOTE — PROGRESS NOTES
On behalf of [facility]'s Structural Heart Program, we would like to thank you for allowing us to participate in the care of your patient, Ms. Cowan.     She underwent a successful transcatheter aortic valve replacement (TAVR) on Monday, February 10, 2025.     Your patient is scheduled to follow up with our Structural Heart Program at one month and one year post procedure.     Again, thank you for allowing us to participate in the care of your patient. If you have any questions, please do not hesitate to contact our Structural Heart team.     Sincerely,    [facility] Structural Heart Team    TAPAN Rhodes, RN, Structural Heart Program Nurse Coordinator ([phone])  TAPAN Ponce, RN, Structural Heart Program Nurse Coordinator ([phone])

## 2025-03-14 DIAGNOSIS — B02.29 POSTHERPETIC NEURALGIA: ICD-10-CM

## 2025-03-18 ENCOUNTER — HOSPITAL ENCOUNTER (OUTPATIENT)
Dept: CARDIOLOGY | Facility: MEDICAL CENTER | Age: 80
End: 2025-03-18
Attending: INTERNAL MEDICINE
Payer: MEDICARE

## 2025-03-18 DIAGNOSIS — I35.0 NONRHEUMATIC AORTIC (VALVE) STENOSIS: ICD-10-CM

## 2025-03-18 PROCEDURE — 93306 TTE W/DOPPLER COMPLETE: CPT

## 2025-03-19 ENCOUNTER — RESULTS FOLLOW-UP (OUTPATIENT)
Dept: MEDICAL GROUP | Facility: MEDICAL CENTER | Age: 80
End: 2025-03-19

## 2025-03-19 ENCOUNTER — HOSPITAL ENCOUNTER (OUTPATIENT)
Dept: RADIOLOGY | Facility: MEDICAL CENTER | Age: 80
End: 2025-03-19
Payer: MEDICARE

## 2025-03-19 ENCOUNTER — OFFICE VISIT (OUTPATIENT)
Dept: MEDICAL GROUP | Facility: MEDICAL CENTER | Age: 80
End: 2025-03-19
Payer: MEDICARE

## 2025-03-19 VITALS
OXYGEN SATURATION: 95 % | TEMPERATURE: 97.7 F | BODY MASS INDEX: 26.74 KG/M2 | SYSTOLIC BLOOD PRESSURE: 130 MMHG | DIASTOLIC BLOOD PRESSURE: 72 MMHG | WEIGHT: 170.4 LBS | HEIGHT: 67 IN | HEART RATE: 81 BPM

## 2025-03-19 DIAGNOSIS — M54.2 NECK PAIN: ICD-10-CM
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
## 2025-03-19 DIAGNOSIS — M53.82 DECREASED ROM OF INTERVERTEBRAL DISCS OF CERVICAL SPINE: ICD-10-CM

## 2025-03-19 PROCEDURE — 99213 OFFICE O/P EST LOW 20 MIN: CPT

## 2025-03-19 PROCEDURE — 72040 X-RAY EXAM NECK SPINE 2-3 VW: CPT

## 2025-03-19 PROCEDURE — 3078F DIAST BP <80 MM HG: CPT

## 2025-03-19 PROCEDURE — 3075F SYST BP GE 130 - 139MM HG: CPT

## 2025-03-19 ASSESSMENT — ENCOUNTER SYMPTOMS
SHORTNESS OF BREATH: 0
ORTHOPNEA: 0
CHILLS: 0
FEVER: 0
PALPITATIONS: 0
NECK PAIN: 1
COUGH: 0

## 2025-03-19 ASSESSMENT — FIBROSIS 4 INDEX: FIB4 SCORE: 4.17

## 2025-03-19 NOTE — PROGRESS NOTES
Subjective:     Verbal consent was acquired by the patient to use [employer] ambient listening note generation during this visit Yes    CC: Follow-Up (Pt states she fell in October and has been having back nec/head pain since. Pt is unsure if it's from fall or ongoing neck issues.)      HPI:   Rosenda is a 79 y.o. female who presents today for:    History of Present Illness  The patient presents for evaluation of neck pain.    She reports experiencing neck pain, which she describes as a pulling sensation on the right side. The pain intensifies when she looks down but does not worsen with lateral head movements.  She does have decreased lateral range of motion. She has been managing the pain with Tylenol, but it has not provided significant relief. She also notes that her range of motion in the neck has been limited for some time, even before the fall. She is uncertain if the current pain is a result of this fall or an existing neck issue. She has not experienced any falls since October 2024 and had not fallen for approximately 15 years prior to this incident. She did not seek immediate medical attention following the fall but applied ice to the area upon returning home. She recalls the incident occurring [place]. As they were leaving, she missed a step while looking for her car and fell, hitting her head.  She denies loss of consciousness at the time of the event.  Her daughter speculates that she may have been dizzy due to her aortic stenosis for which she recently had a recent TAVR procedure, as she had experienced similar episodes at the casino prior to the fall. However, she does not recall feeling dizzy at the time of the incident. She typically does not have balance issues and is unsure what caused the fall.               Allergies: Patient has no known allergies.     Medications:   Current Outpatient Medications:     aspirin 81 MG EC tablet, Take 1 Tablet by mouth every day.,  "Disp: 100 Tablet, Rfl: 3    amLODIPine (NORVASC) 2.5 MG Tab, Take 1 Tablet by mouth every day., Disp: 100 Tablet, Rfl: 3    atorvastatin (LIPITOR) 10 MG Tab, Take 1 Tablet by mouth every evening., Disp: 90 Tablet, Rfl: 3    losartan (COZAAR) 100 MG Tab, Take 1 Tablet by mouth at bedtime., Disp: 90 Tablet, Rfl: 3    pregabalin (LYRICA) 150 MG Cap, Take 1 Capsule by mouth every 8 hours for 180 days. Indications: Neuropathic Pain, Disp: 270 Capsule, Rfl: 1    estradiol (ESTRACE) 0.1 MG/GM vaginal cream, Insert 0.5 g into the vagina Every Sunday and Wednesday., Disp: 42.5 g, Rfl: 5    Calcium Carb-Cholecalciferol (CALCIUM 600+D3 PO), Take 1 Tablet by mouth every day., Disp: , Rfl:     Multiple Vitamins-Minerals (MULTIPLE VITAMINS/WOMENS) Tab, Take 1 Tablet by mouth every day., Disp: , Rfl:       ROS:  Review of Systems   Constitutional:  Negative for chills and fever.   Respiratory:  Negative for cough and shortness of breath.    Cardiovascular:  Negative for chest pain, palpitations, orthopnea and leg swelling.   Musculoskeletal:  Positive for neck pain.       Objective:     Exam:  /72   Pulse 81   Temp 36.5 °C (97.7 °F) (Temporal)   Ht 1.702 m (5' 7\")   Wt 77.3 kg (170 lb 6.4 oz)   LMP  (LMP Unknown)   SpO2 95%   BMI 26.69 kg/m²  Body mass index is 26.69 kg/m².    Physical Exam  Constitutional:       Appearance: Normal appearance.   Eyes:      Pupils: Pupils are equal, round, and reactive to light.   Cardiovascular:      Rate and Rhythm: Normal rate and regular rhythm.      Pulses: Normal pulses.      Heart sounds: Normal heart sounds.   Pulmonary:      Effort: Pulmonary effort is normal.      Breath sounds: Normal breath sounds.   Abdominal:      General: Bowel sounds are normal.      Palpations: Abdomen is soft.   Neurological:      Mental Status: She is alert and oriented to person, place, and time.   Psychiatric:         Mood and Affect: Mood normal.         Behavior: Behavior normal. "           Assessment & Plan:     Rosenda guerrero 79 y.o. female with the following -     Assessment & Plan    1. Neck pain    2. Decreased ROM of intervertebral discs of cervical spine  3. Fall, subsequent encounter  Acute, uncomplicated  The patient's symptoms do not include alarming signs such as burning, numbness, or tingling in the upper extremities, which is a positive indication. An x-ray of the cervical spine will be ordered to rule out any fractures or misalignments that may have resulted from the fall. A referral for physical therapy will be made to assist with stretching and relaxation techniques for the neck. She will be informed about the x-ray results via [employer]. If the results are normal, she will proceed with physical therapy. However, if any concerning findings are identified, a referral to an orthopedic specialist will be initiated.  - DX-CERVICAL SPINE-2 OR 3 VIEWS; Future  - Referral to Physical Therapy        Anticipatory guidance included the following: Patient counseled about skin care, diet, supplements, smoking, drugs/alcohol use, safe sex and exercise.     Return if symptoms worsen or fail to improve.    Please note that this dictation was created using voice recognition software. I have made every reasonable attempt to correct obvious errors, but I expect that there are errors of grammar and possibly content that I did not discover before finalizing the note.

## 2025-03-20 LAB
LV EJECT FRACT  99904: 62
LV EJECT FRACT MOD 2C 99903: 61.04
LV EJECT FRACT MOD 4C 99902: 68.61
LV EJECT FRACT MOD BP 99901: 64.91

## 2025-03-20 RX ORDER — PREGABALIN 150 MG/1
150 CAPSULE ORAL EVERY 8 HOURS
Qty: 270 CAPSULE | Refills: 1 | Status: SHIPPED | OUTPATIENT
Start: 2025-03-20 | End: 2025-09-16

## 2025-03-21 ENCOUNTER — RESULTS FOLLOW-UP (OUTPATIENT)
Dept: CARDIOLOGY | Facility: MEDICAL CENTER | Age: 80
End: 2025-03-21
Payer: MEDICARE

## 2025-03-25 NOTE — Clinical Note
REFERRAL APPROVAL NOTICE         Sent on March 25, 2025                   Rosenda Miri Chapo  [address] [place] [address]                   Dear MsShanthi Cowan,    After a careful review of the medical information and benefit coverage, [facility] has processed your referral. See below for additional details.    If applicable, you must be actively enrolled with your insurance for coverage of the authorized service. If you have any questions regarding your coverage, please contact your insurance directly.    REFERRAL INFORMATION   Referral #:  [id number]  Referred-To Service Location    Referred-By Provider:  Physical Therapy    ISABEL Arroyo   [facility]      [address] [place] [address] [address]  [place] [address]  [phone] [address]  [place] [id number] [phone]    Referral Start Date:  03/19/2025  Referral End Date:   03/19/2026             SCHEDULING  If you do not already have an appointment, please call [phone] to make an appointment.     MORE INFORMATION  If you do not already have a [employer] account, sign up at: [email or web address]  You can access your medical information, make appointments, see lab results, billing information, and more.  If you have questions regarding this referral, please contact  the [facility] Referrals department at:             [phone]. Monday - Friday 8:00AM - 5:00PM.     Sincerely,    [facility]

## 2025-03-27 ENCOUNTER — OFFICE VISIT (OUTPATIENT)
Dept: CARDIOLOGY | Facility: MEDICAL CENTER | Age: 80
End: 2025-03-27
Attending: NURSE PRACTITIONER
Payer: MEDICARE

## 2025-03-27 ENCOUNTER — DOCUMENTATION (OUTPATIENT)
Dept: CARDIOLOGY | Facility: MEDICAL CENTER | Age: 80
End: 2025-03-27
Payer: MEDICARE

## 2025-03-27 VITALS
HEART RATE: 73 BPM | SYSTOLIC BLOOD PRESSURE: 126 MMHG | WEIGHT: 174 LBS | HEIGHT: 67 IN | DIASTOLIC BLOOD PRESSURE: 70 MMHG | BODY MASS INDEX: 27.31 KG/M2 | OXYGEN SATURATION: 97 %

## 2025-03-27 DIAGNOSIS — R42 EPISODIC LIGHTHEADEDNESS: ICD-10-CM

## 2025-03-27 DIAGNOSIS — I70.90 ATHEROSCLEROTIC VASCULAR DISEASE: ICD-10-CM

## 2025-03-27 DIAGNOSIS — R06.09 DYSPNEA ON EXERTION: ICD-10-CM

## 2025-03-27 DIAGNOSIS — I77.4 CELIAC ARTERY STENOSIS (HCC): ICD-10-CM

## 2025-03-27 DIAGNOSIS — I70.1 RENAL ARTERY STENOSIS (HCC): ICD-10-CM

## 2025-03-27 DIAGNOSIS — E78.5 DYSLIPIDEMIA: ICD-10-CM

## 2025-03-27 DIAGNOSIS — Z95.2 S/P TAVR (TRANSCATHETER AORTIC VALVE REPLACEMENT): ICD-10-CM

## 2025-03-27 DIAGNOSIS — I10 ESSENTIAL HYPERTENSION: ICD-10-CM

## 2025-03-27 DIAGNOSIS — I25.10 CORONARY ARTERY DISEASE INVOLVING NATIVE CORONARY ARTERY OF NATIVE HEART WITHOUT ANGINA PECTORIS: ICD-10-CM

## 2025-03-27 PROCEDURE — 99212 OFFICE O/P EST SF 10 MIN: CPT | Performed by: NURSE PRACTITIONER

## 2025-03-27 ASSESSMENT — FIBROSIS 4 INDEX: FIB4 SCORE: 4.17

## 2025-03-27 ASSESSMENT — ENCOUNTER SYMPTOMS
MYALGIAS: 0
ORTHOPNEA: 0
DIZZINESS: 0
SHORTNESS OF BREATH: 0
FEVER: 0
CLAUDICATION: 0
ABDOMINAL PAIN: 0
PALPITATIONS: 0
COUGH: 0
PND: 0

## 2025-03-27 NOTE — PROGRESS NOTES
Valve Program Functional Assessment:     KCCQ12   1a) Showering/bathin  1b) Walking 1 block on ground: 5  1c) Hurrying or joggin  2) Swellin  3) Fatigue: 5  4) Shortness of breath: 5  5) Sleep sitting up: 5  6) Limited enjoyment of life: 5  7) Spend the rest of your life with HF: 4  8a) Hobbies, recreational activities:4  8b) Working or doing household chores:4  8c) Visiting family or friends: 4

## 2025-03-27 NOTE — PROGRESS NOTES
Chief Complaint   Patient presents with    Aortic Stenosis    Hypertension     Subjective     Rosenda Cowan is a 79 y.o. female who presents today for 1 month S/P TAVR.    She is a patient of Dr. Nava. Hx of severe symptomatic aortic stenosis now S/P TAVR, HLD with non-obstructive CAD, HTN with renal artery stenosis, celiac artery stenosis, and atherosclerotic valvular disease .    She reports no concerns. Feels good. Taking all medications. A few episodes of shortness of breath and lightheadedness.    He has no chest pain, edema, or palpitations.    Past Medical History:   Diagnosis Date    Breath shortness     Dental disorder     upper denture    Gynecological disorder     Heart murmur     Heart valve disease     High cholesterol     Hypertension     Pain      Past Surgical History:   Procedure Laterality Date    TRANSCATHETER AORTIC VALVE REPLACEMENT Bilateral 2/10/2025    Procedure: TRANSCATHETER AORTIC VALVE REPLACEMENT;  Surgeon: Abad Nava M.D.;  Location: SURGERY [facility];  Service: Cardiac    ECHOCARDIOGRAM, TRANSESOPHAGEAL, INTRAOPERATIVE N/A 2/10/2025    Procedure: ECHOCARDIOGRAM, TRANSESOPHAGEAL, INTRAOPERATIVE;  Surgeon: Abad Nava M.D.;  Location: SURGERY [facility];  Service: Cardiac    [place] HYSTEROSCOPY,DX,SEP PROC N/A 10/16/2019    Procedure: HYSTEROSCOPY, DIAGNOSTIC;  Surgeon: Erin Gann M.D.;  Location: SURGERY SAME DAY [facility];  Service: Obstetrics    DILATION AND CURETTAGE N/A 10/16/2019    Procedure: DILATION AND CURETTAGE;  Surgeon: Erin Gann M.D.;  Location: SURGERY SAME DAY [facility];  Service: Obstetrics    APPENDECTOMY      CHOLECYSTECTOMY      NO PERTINENT PAST SURGICAL HISTORY      Gall bladder, appendix, tubal ligation tonsillectomy    OTHER      TUBAL COAGULATION LAPAROSCOPIC BILATERAL       Family History   Problem Relation Age of Onset    Hypertension Mother     Heart Disease Father     Hypertension Father     Colon Cancer Father      Other Sister         Intellectual Disability     No Known Problems Daughter     No Known Problems Daughter      Social History     Socioeconomic History    Marital status:      Spouse name: Not on file    Number of children: Not on file    Years of education: Not on file    Highest education level: 12th grade   Occupational History    Not on file   Tobacco Use    Smoking status: Never    Smokeless tobacco: Never   Vaping Use    Vaping status: Never Used   Substance and Sexual Activity    Alcohol use: Yes     Alcohol/week: 2.4 oz     Types: 4 Standard drinks or equivalent per week     Comment: Hiwot and coffee, occasional    Drug use: No    Sexual activity: Yes     Partners: Male     Birth control/protection: Post-Menopausal     Comment: .    Other Topics Concern    Not on file   Social History Narrative    Not on file     Social Drivers of Health     Financial Resource Strain: Low Risk  (11/14/2024)    Overall Financial Resource Strain (CARDIA)     Difficulty of Paying Living Expenses: Not hard at all   Food Insecurity: No Food Insecurity (11/14/2024)    Hunger Vital Sign     Worried About Running Out of Food in the Last Year: Never true     Ran Out of Food in the Last Year: Never true   Transportation Needs: No Transportation Needs (11/14/2024)    PRAPARE - Transportation     Lack of Transportation (Medical): No     Lack of Transportation (Non-Medical): No   Physical Activity: Insufficiently Active (11/14/2024)    Exercise Vital Sign     Days of Exercise per Week: 3 days     Minutes of Exercise per Session: 10 min   Stress: Stress Concern Present (11/14/2024)    [employer] [place] [employer] - Occupational Stress Questionnaire     Feeling of Stress : To some extent   Social Connections: Moderately Isolated (11/14/2024)    Social Connection and Isolation Panel [NHANES]     Frequency of Communication with Friends and Family: More than three times a week     Frequency of Social Gatherings  "with Friends and Family: Twice a week     Attends Synagogue Services: Never     Active Member of Clubs or Organizations: No     Attends Club or Organization Meetings: Never     Marital Status:    Intimate Partner Violence: Not on file   Housing Stability: Low Risk  (11/14/2024)    Housing Stability Vital Sign     Unable to Pay for Housing in the Last Year: No     Number of Times Moved in the Last Year: 0     Homeless in the Last Year: No     No Known Allergies  Outpatient Encounter Medications as of 3/27/2025   Medication Sig Dispense Refill    pregabalin (LYRICA) 150 MG Cap Take 1 Capsule by mouth every 8 hours for 180 days. Indications: Neuropathic Pain 270 Capsule 1    aspirin 81 MG EC tablet Take 1 Tablet by mouth every day. 100 Tablet 3    amLODIPine (NORVASC) 2.5 MG Tab Take 1 Tablet by mouth every day. 100 Tablet 3    atorvastatin (LIPITOR) 10 MG Tab Take 1 Tablet by mouth every evening. 90 Tablet 3    losartan (COZAAR) 100 MG Tab Take 1 Tablet by mouth at bedtime. 90 Tablet 3    estradiol (ESTRACE) 0.1 MG/GM vaginal cream Insert 0.5 g into the vagina Every Sunday and Wednesday. 42.5 g 5    Calcium Carb-Cholecalciferol (CALCIUM 600+D3 PO) Take 1 Tablet by mouth every day.      Multiple Vitamins-Minerals (MULTIPLE VITAMINS/WOMENS) Tab Take 1 Tablet by mouth every day.       No facility-administered encounter medications on file as of 3/27/2025.     Review of Systems   Constitutional:  Negative for fever and malaise/fatigue.   Respiratory:  Negative for cough and shortness of breath.    Cardiovascular:  Negative for chest pain, palpitations, orthopnea, claudication, leg swelling and PND.   Gastrointestinal:  Negative for abdominal pain.   Musculoskeletal:  Negative for myalgias.   Neurological:  Negative for dizziness.              Objective     /70 (BP Location: Left arm, Patient Position: Sitting, BP Cuff Size: Adult)   Pulse 73   Ht 1.702 m (5' 7\")   Wt 78.9 kg (174 lb)   LMP  (LMP Unknown)  "  SpO2 97%   BMI 27.25 kg/m²     Physical Exam  Vitals and nursing note reviewed.   Constitutional:       Appearance: Normal appearance. She is well-developed. She is obese.   HENT:      Head: Normocephalic and atraumatic.   Neck:      Vascular: No JVD.   Cardiovascular:      Rate and Rhythm: Normal rate and regular rhythm.      Pulses: Normal pulses.      Heart sounds: Normal heart sounds.   Pulmonary:      Effort: Pulmonary effort is normal.      Breath sounds: Normal breath sounds.   Musculoskeletal:         General: Normal range of motion.   Skin:     General: Skin is warm and dry.      Capillary Refill: Capillary refill takes less than 2 seconds.   Neurological:      General: No focal deficit present.      Mental Status: She is alert and oriented to person, place, and time. Mental status is at baseline.   Psychiatric:         Mood and Affect: Mood normal.         Behavior: Behavior normal.         Thought Content: Thought content normal.         Judgment: Judgment normal.                Assessment & Plan     1. S/P TAVR (transcatheter aortic valve replacement)        2. Atherosclerotic vascular disease        3. Celiac artery stenosis (HCC)        4. Coronary artery disease involving native coronary artery of native heart without angina pectoris        5. Dyslipidemia        6. Essential hypertension        7. Renal artery stenosis (HCC)          Medical Decision Making: Today's Assessment/Status/Plan:      1. S/P TAVR, NYHA I  -cont asa lifelong  -SBE prophylaxis understands lifelong  -echo 1 month reviewed with no concerns, repeat echo in 1 year  -cardiac rehab pending start soon    2. HTN  -good control  -celiac artery and renal artery stenosis noted  -no concerns on amlodipine and losartan  -BP goal <130/80    3. HLD with CAD, non-obstructive  -no angina or MARIE  -cont asa, statin  -LDL goal <70 with CAD    Patient is to follow up with Delores MEJIA in 3 months with labs.

## 2025-03-31 ENCOUNTER — TELEPHONE (OUTPATIENT)
Dept: MEDICAL GROUP | Facility: MEDICAL CENTER | Age: 80
End: 2025-03-31
Payer: MEDICARE

## 2025-03-31 DIAGNOSIS — M54.2 NECK PAIN: ICD-10-CM
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
## 2025-03-31 DIAGNOSIS — M53.82 DECREASED ROM OF INTERVERTEBRAL DISCS OF CERVICAL SPINE: ICD-10-CM

## 2025-03-31 NOTE — TELEPHONE ENCOUNTER
1. Caller Name: Rosenda Cowan                         Call Back Number: [phone]       How would the patient prefer to be contacted with a response: [employer] message    Pt sent request via [employer]  2.  Diagnosis/Clinical Reason for Request: Neck pain.    3. Specialty & Provider Name/Lab/Imaging Location: Physical Therapy    Comment:  The previous chiropractic referral isn't accepting new [employer] patients. Can you recommend another one? Thank you.    4. Is appointment scheduled for requested order/referral: no    Patient was informed they will receive a return phone call from the office ONLY if there are any questions before processing their request. Advised to call back if they haven't received a call from the referral department in 5 days.

## 2025-04-07 NOTE — Clinical Note
REFERRAL APPROVAL NOTICE         Sent on April 7, 2025                   Rosenda Miri Cowan  [address] [place] [address]                   Dear Ms. Cowan,    After a careful review of the medical information and benefit coverage, [facility] has processed your referral. See below for additional details.    If applicable, you must be actively enrolled with your insurance for coverage of the authorized service. If you have any questions regarding your coverage, please contact your insurance directly.    REFERRAL INFORMATION   Referral #:  [id number]  Referred-To Department    Referred-By Provider:  Physical Therapy    Graham Kim D.O.   Phys Therapy [address]  [place] [address]  [phone] [address] [place] [address], [address]  [place] [address]  [phone]    Referral Start Date:  03/31/2025  Referral End Date:   03/31/2026             SCHEDULING  If you do not already have an appointment, please call [phone] to make an appointment.     MORE INFORMATION  If you do not already have a [employer] account, sign up at: [email or web address]  You can access your medical information, make appointments, see lab results, billing information, and more.  If you have questions regarding this referral, please contact  the [facility] Referrals department at:             [phone]. Monday - Friday 8:00AM - 5:00PM.     Sincerely,    [facility]

## 2025-06-02 ENCOUNTER — APPOINTMENT (OUTPATIENT)
Dept: PHYSICAL THERAPY | Facility: REHABILITATION | Age: 80
End: 2025-06-02
Attending: FAMILY MEDICINE
Payer: MEDICARE

## 2025-06-04 ENCOUNTER — APPOINTMENT (OUTPATIENT)
Dept: PHYSICAL THERAPY | Facility: REHABILITATION | Age: 80
End: 2025-06-04
Attending: FAMILY MEDICINE
Payer: MEDICARE

## 2025-06-09 ENCOUNTER — APPOINTMENT (OUTPATIENT)
Dept: PHYSICAL THERAPY | Facility: REHABILITATION | Age: 80
End: 2025-06-09
Attending: FAMILY MEDICINE
Payer: MEDICARE

## 2025-06-11 ENCOUNTER — APPOINTMENT (OUTPATIENT)
Dept: PHYSICAL THERAPY | Facility: REHABILITATION | Age: 80
End: 2025-06-11
Attending: FAMILY MEDICINE
Payer: MEDICARE

## 2025-06-16 ENCOUNTER — APPOINTMENT (OUTPATIENT)
Dept: PHYSICAL THERAPY | Facility: REHABILITATION | Age: 80
End: 2025-06-16
Attending: FAMILY MEDICINE
Payer: MEDICARE

## 2025-06-18 ENCOUNTER — APPOINTMENT (OUTPATIENT)
Dept: PHYSICAL THERAPY | Facility: REHABILITATION | Age: 80
End: 2025-06-18
Attending: FAMILY MEDICINE
Payer: MEDICARE

## 2025-06-23 ENCOUNTER — APPOINTMENT (OUTPATIENT)
Dept: PHYSICAL THERAPY | Facility: REHABILITATION | Age: 80
End: 2025-06-23
Attending: FAMILY MEDICINE
Payer: MEDICARE

## 2025-06-25 ENCOUNTER — APPOINTMENT (OUTPATIENT)
Dept: PHYSICAL THERAPY | Facility: REHABILITATION | Age: 80
End: 2025-06-25
Attending: FAMILY MEDICINE
Payer: MEDICARE

## 2025-06-30 ENCOUNTER — APPOINTMENT (OUTPATIENT)
Dept: PHYSICAL THERAPY | Facility: REHABILITATION | Age: 80
End: 2025-06-30
Attending: FAMILY MEDICINE
Payer: MEDICARE

## 2025-07-02 ENCOUNTER — APPOINTMENT (OUTPATIENT)
Dept: PHYSICAL THERAPY | Facility: REHABILITATION | Age: 80
End: 2025-07-02
Attending: FAMILY MEDICINE
Payer: MEDICARE

## 2025-07-07 ENCOUNTER — APPOINTMENT (OUTPATIENT)
Dept: PHYSICAL THERAPY | Facility: REHABILITATION | Age: 80
End: 2025-07-07
Attending: FAMILY MEDICINE
Payer: MEDICARE

## 2025-07-09 ENCOUNTER — APPOINTMENT (OUTPATIENT)
Dept: PHYSICAL THERAPY | Facility: REHABILITATION | Age: 80
End: 2025-07-09
Attending: FAMILY MEDICINE
Payer: MEDICARE

## 2025-07-17 ENCOUNTER — HOSPITAL ENCOUNTER (OUTPATIENT)
Dept: LAB | Facility: MEDICAL CENTER | Age: 80
End: 2025-07-17
Attending: NURSE PRACTITIONER
Payer: MEDICARE

## 2025-07-17 DIAGNOSIS — R42 EPISODIC LIGHTHEADEDNESS: ICD-10-CM

## 2025-07-17 DIAGNOSIS — Z95.2 S/P TAVR (TRANSCATHETER AORTIC VALVE REPLACEMENT): ICD-10-CM

## 2025-07-17 DIAGNOSIS — R06.09 DYSPNEA ON EXERTION: ICD-10-CM

## 2025-07-17 LAB
ANION GAP SERPL CALC-SCNC: 11 MMOL/L (ref 7–16)
BUN SERPL-MCNC: 13 MG/DL (ref 8–22)
CALCIUM SERPL-MCNC: 9.4 MG/DL (ref 8.5–10.5)
CHLORIDE SERPL-SCNC: 106 MMOL/L (ref 96–112)
CO2 SERPL-SCNC: 26 MMOL/L (ref 20–33)
CREAT SERPL-MCNC: 0.88 MG/DL (ref 0.5–1.4)
ERYTHROCYTE [DISTWIDTH] IN BLOOD BY AUTOMATED COUNT: 43.4 FL (ref 35.9–50)
GFR SERPLBLD CREATININE-BSD FMLA CKD-EPI: 67 ML/MIN/1.73 M 2
GLUCOSE SERPL-MCNC: 95 MG/DL (ref 65–99)
HCT VFR BLD AUTO: 42 % (ref 37–47)
HGB BLD-MCNC: 14.1 G/DL (ref 12–16)
MCH RBC QN AUTO: 30.3 PG (ref 27–33)
MCHC RBC AUTO-ENTMCNC: 33.6 G/DL (ref 32.2–35.5)
MCV RBC AUTO: 90.1 FL (ref 81.4–97.8)
NT-PROBNP SERPL IA-MCNC: 110 PG/ML (ref 0–125)
PLATELET # BLD AUTO: 175 K/UL (ref 164–446)
PMV BLD AUTO: 10.5 FL (ref 9–12.9)
POTASSIUM SERPL-SCNC: 4.6 MMOL/L (ref 3.6–5.5)
RBC # BLD AUTO: 4.66 M/UL (ref 4.2–5.4)
SODIUM SERPL-SCNC: 143 MMOL/L (ref 135–145)
WBC # BLD AUTO: 3.5 K/UL (ref 4.8–10.8)

## 2025-07-17 PROCEDURE — 85027 COMPLETE CBC AUTOMATED: CPT

## 2025-07-17 PROCEDURE — 36415 COLL VENOUS BLD VENIPUNCTURE: CPT

## 2025-07-17 PROCEDURE — 83880 ASSAY OF NATRIURETIC PEPTIDE: CPT

## 2025-07-17 PROCEDURE — 80048 BASIC METABOLIC PNL TOTAL CA: CPT

## (undated) DEVICE — INTRODUCER CATHETER DILATOR PROTRUDING 8FR 2.5CM (10EA/BX)

## (undated) DEVICE — TUBING CLEARLINK DUO-VENT - C-FLO (48EA/CA)

## (undated) DEVICE — CABLE TEMPORARY PACING

## (undated) DEVICE — IV TUBING HI-FLO RATE W/CLAMP (50/CA)

## (undated) DEVICE — SUCTION INSTRUMENT YANKAUER BULBOUS TIP W/O VENT (50EA/CA)

## (undated) DEVICE — COVER LIGHT HANDLE ALC PLUS DISP (18EA/BX)

## (undated) DEVICE — SYSTEM DELIVERY COMMANDER TAVR KIT 23MM COMPONENT (1EA)

## (undated) DEVICE — DEVICE INFLATION ATRION NOVALFEX TRANSFEMORAL SYSTEM (1EA)

## (undated) DEVICE — TUBING INFLOW HYSTEROSCOPY (10EA/CA)

## (undated) DEVICE — TOWELS CLOTH SURGICAL - (4/PK 20PK/CA)

## (undated) DEVICE — CATHETER IV 20 GA X 1-1/4 ---SURG.& SDS ONLY--- (50EA/BX)

## (undated) DEVICE — WATER IRRIGATION STERILE 1000ML (12EA/CA)

## (undated) DEVICE — KIT  I.V. START (100EA/CA)

## (undated) DEVICE — WIRE GUIDE AES .035 260CM WITH 3MM J TIP"

## (undated) DEVICE — STOPCOCK IV 400 PSI 3W ROT (50EA/BX)

## (undated) DEVICE — MANIFOLD NEPTUNE 1 PORT (20/PK)

## (undated) DEVICE — CHLORAPREP 26 ML APPLICATOR - ORANGE TINT(25/CA)

## (undated) DEVICE — CRIMPER CATHETER EDWARDS DISPOSABLE (1EA)

## (undated) DEVICE — DRAPE UNDER BUTTOCKS FLUID - (20/CA)

## (undated) DEVICE — SUTURE GENERAL

## (undated) DEVICE — SHEATH DESTINATION WITH DILATOR 6FR 45CM

## (undated) DEVICE — SUTURE 0 ETHIBOND CT-1 30 IN (36PK/BX)

## (undated) DEVICE — CATHETER 6FR AL1 100CM (5/BX)

## (undated) DEVICE — GOWN SURGICAL XX-LARGE - (28EA/CA) SIRUS NON REINFORCED

## (undated) DEVICE — CANISTER SUCTION RIGID RED 1500CC (40EA/CA)

## (undated) DEVICE — SYR ANGIO CNRST INJ HI-PRS 3W 65 - (10EA/CA)"

## (undated) DEVICE — SUTURE DEVICE CLOSURE REPAIR SYSTEM PERCLOSE PROSTYLE (10EA/PK)

## (undated) DEVICE — BLADE SURGICAL #11 - (50/BX)

## (undated) DEVICE — SHEATH RO 6F 25CM (10EA/BX)

## (undated) DEVICE — PROTECTOR ULNA NERVE - (36PR/CA)

## (undated) DEVICE — SET EXTENSION WITH 2 PORTS (48EA/CA) ***PART #2C8610 IS A SUBSTITUTE*****

## (undated) DEVICE — LACTATED RINGERS INJ 1000 ML - (14EA/CA 60CA/PF)

## (undated) DEVICE — TRAY SRGPRP PVP IOD WT PRP - (20/CA)

## (undated) DEVICE — CANISTER SUCTION 3000ML MECHANICAL FILTER AUTO SHUTOFF MEDI-VAC NONSTERILE LF DISP (40EA/CA)

## (undated) DEVICE — PACK TAVR (3EA/CA)

## (undated) DEVICE — INTRODUCER SHEATH 6FR 2.5CM - DILATOR PROTRUDING (10/BX)

## (undated) DEVICE — DECANTER FLD BLS - (50/CA)

## (undated) DEVICE — HEAD HOLDER JUNIOR/ADULT

## (undated) DEVICE — TUBING OUTFLOW HYSTEROSCPY (10EA/BX)

## (undated) DEVICE — COVER LIGHT HANDLE FLEXIBLE - SOFT (2EA/PK 80PK/CA)

## (undated) DEVICE — KIT ANESTHESIA W/CIRCUIT & 3/LT BAG W/FILTER (20EA/CA)

## (undated) DEVICE — GUIDEWIRE STARTER STRAIGHT FIXED CORE .035 150CM 4 STRAIGHT PTFE/HEPARIN COATED (10/BX)

## (undated) DEVICE — SODIUM CHL IRRIGATION 0.9% 1000ML (12EA/CA)

## (undated) DEVICE — SET LEADWIRE 5 LEAD BEDSIDE DISPOSABLE ECG (1SET OF 5/EA)

## (undated) DEVICE — TUBE E-T HI-LO CUFF 7.0MM (10EA/PK)

## (undated) DEVICE — ELECTRODE 850 FOAM ADHESIVE - HYDROGEL RADIOTRNSPRNT (50/PK)

## (undated) DEVICE — SENSOR SPO2 NEO LNCS ADHESIVE (20/BX) SEE USER NOTES

## (undated) DEVICE — GLOVESZ 8.5 BIOGEL PI MICRO - PF LF (50PR/BX)

## (undated) DEVICE — DRAPE MAYO STAND - (30/CA)

## (undated) DEVICE — SOLUTION SORBITOL 3000ML (4/CA)

## (undated) DEVICE — ELECTRODE DUAL RETURN W/ CORD - (50/PK)

## (undated) DEVICE — KIT RETROFIT PROBE COVERS (24EA/BX)

## (undated) DEVICE — COVER FOOT UNIVERSAL DISP. - (25EA/CA)

## (undated) DEVICE — PAD SANITARY 11IN MAXI IND WRAPPED  (12EA/PK 24PK/CA)

## (undated) DEVICE — WIRE GUIDE LUNDQST.035X180 - TSMG-35-180-4-LES ORDER BY BOX (5EA/BX)

## (undated) DEVICE — Device

## (undated) DEVICE — CANISTER SUCTION 3000ML MECHANICAL FILTER AUTO SHUTOFF MEDI-VAC NONSTERILE LF DISP  (40EA/CA)

## (undated) DEVICE — TUBE CONNECTING SUCTION - CLEAR PLASTIC STERILE 72 IN (50EA/CA)

## (undated) DEVICE — CATHETER PIGTAIL 6FR 145 (5EA/BX)

## (undated) DEVICE — GLIDESHEATH SLENDER NITINOL KIT .021 GW 6FR 10CM SINGLE WALL

## (undated) DEVICE — SENSOR OXIMETER ADULT SPO2 RD SET (20EA/BX)

## (undated) DEVICE — ELECTRODE RADIOLUCNT SOLID GEL DEFIB PADS (12EA/CA)

## (undated) DEVICE — ARM BAND RADIAL TR BAND (5EA/BX)

## (undated) DEVICE — DRAPE CLEAR W/ELASTIC BAND RAD CARM 40 X40" (20EA/CA)"

## (undated) DEVICE — MASK ANESTHESIA ADULT  - (100/CA)